# Patient Record
Sex: FEMALE | Race: OTHER | HISPANIC OR LATINO
[De-identification: names, ages, dates, MRNs, and addresses within clinical notes are randomized per-mention and may not be internally consistent; named-entity substitution may affect disease eponyms.]

---

## 2020-11-24 ENCOUNTER — LABORATORY RESULT (OUTPATIENT)
Age: 30
End: 2020-11-24

## 2020-11-25 ENCOUNTER — APPOINTMENT (OUTPATIENT)
Dept: OBGYN | Facility: CLINIC | Age: 30
End: 2020-11-25
Payer: COMMERCIAL

## 2020-11-25 VITALS
BODY MASS INDEX: 28.34 KG/M2 | WEIGHT: 154 LBS | HEIGHT: 62 IN | SYSTOLIC BLOOD PRESSURE: 118 MMHG | DIASTOLIC BLOOD PRESSURE: 74 MMHG

## 2020-11-25 DIAGNOSIS — Z30.09 ENCOUNTER FOR OTHER GENERAL COUNSELING AND ADVICE ON CONTRACEPTION: ICD-10-CM

## 2020-11-25 PROCEDURE — 99395 PREV VISIT EST AGE 18-39: CPT

## 2020-11-25 NOTE — HISTORY OF PRESENT ILLNESS
[TextBox_4] : Patient is a 29 year old female who presents for her annual exam.  Reports no menses since february.  States that she had previous irreg menses but was normal on ocps.  Would like to start xulane today to regulate menses again.  No abdominal or pelvic pain, change in discharge, change in bowel or bladder habits or any other concerns.  Due for pap.

## 2020-11-25 NOTE — REVIEW OF SYSTEMS
[Patient Intake Form Reviewed] : Patient intake form was reviewed [Abn Vaginal bleeding] : abnormal vaginal bleeding [Negative] : Heme/Lymph [FreeTextEntry8] : amenorrhea since Feb

## 2020-11-25 NOTE — COUNSELING
[Nutrition/ Exercise/ Weight Management] : nutrition, exercise, weight management [Body Image] : body image [Breast Self Exam] : breast self exam [Bladder Hygiene] : bladder hygiene [Contraception/ Emergency Contraception/ Safe Sexual Practices] : contraception, emergency contraception, safe sexual practices [STD (testing, results, tx)] : STD (testing, results, tx) [Vaccines] : vaccines

## 2021-04-18 ENCOUNTER — TRANSCRIPTION ENCOUNTER (OUTPATIENT)
Age: 31
End: 2021-04-18

## 2021-11-15 ENCOUNTER — APPOINTMENT (OUTPATIENT)
Dept: INTERNAL MEDICINE | Facility: CLINIC | Age: 31
End: 2021-11-15
Payer: COMMERCIAL

## 2021-11-15 VITALS
WEIGHT: 165 LBS | BODY MASS INDEX: 30.36 KG/M2 | DIASTOLIC BLOOD PRESSURE: 77 MMHG | RESPIRATION RATE: 14 BRPM | OXYGEN SATURATION: 96 % | HEIGHT: 62 IN | HEART RATE: 103 BPM | TEMPERATURE: 98.3 F | SYSTOLIC BLOOD PRESSURE: 128 MMHG

## 2021-11-15 DIAGNOSIS — Z86.69 PERSONAL HISTORY OF OTHER DISEASES OF THE NERVOUS SYSTEM AND SENSE ORGANS: ICD-10-CM

## 2021-11-15 DIAGNOSIS — Z78.9 OTHER SPECIFIED HEALTH STATUS: ICD-10-CM

## 2021-11-15 DIAGNOSIS — H93.11 TINNITUS, RIGHT EAR: ICD-10-CM

## 2021-11-15 DIAGNOSIS — Z91.89 OTHER SPECIFIED PERSONAL RISK FACTORS, NOT ELSEWHERE CLASSIFIED: ICD-10-CM

## 2021-11-15 DIAGNOSIS — Z82.49 FAMILY HISTORY OF ISCHEMIC HEART DISEASE AND OTHER DISEASES OF THE CIRCULATORY SYSTEM: ICD-10-CM

## 2021-11-15 PROCEDURE — 99385 PREV VISIT NEW AGE 18-39: CPT

## 2021-11-15 RX ORDER — NORELGESTROMIN AND ETHINYL ESTRADIOL 150; 35 UG/D; UG/D
150-35 PATCH TRANSDERMAL
Qty: 1 | Refills: 11 | Status: DISCONTINUED | COMMUNITY
Start: 2020-11-25 | End: 2021-11-15

## 2021-11-15 NOTE — HISTORY OF PRESENT ILLNESS
[FreeTextEntry1] : WT GAIN \par LMP 3/2020 [de-identified] : PT COMES FOR INITIAL CPE \par AT AGE 22 HER WT  LBS\par HAS BEEN HAVING RINGING ON R EAR AND SPORADIC DIZZINESS- VERTIGO TYPE FEELING THAT LAST 20-30 SECONDS,THIS HAS BEEN HAPPENING FOR LAST FEW MONTHS\par HAD MRNA X2,DOES NOT GETS INFLUENZA VACCINE

## 2021-11-15 NOTE — PHYSICAL EXAM
[No Acute Distress] : no acute distress [Well Nourished] : well nourished [Well Developed] : well developed [Well-Appearing] : well-appearing [Normal Sclera/Conjunctiva] : normal sclera/conjunctiva [PERRL] : pupils equal round and reactive to light [EOMI] : extraocular movements intact [Normal Outer Ear/Nose] : the outer ears and nose were normal in appearance [No JVD] : no jugular venous distention [No Lymphadenopathy] : no lymphadenopathy [Supple] : supple [Thyroid Normal, No Nodules] : the thyroid was normal and there were no nodules present [No Respiratory Distress] : no respiratory distress  [No Accessory Muscle Use] : no accessory muscle use [Clear to Auscultation] : lungs were clear to auscultation bilaterally [Regular Rhythm] : with a regular rhythm [Normal Rate] : normal rate  [Normal S1, S2] : normal S1 and S2 [No Murmur] : no murmur heard [No Carotid Bruits] : no carotid bruits [No Abdominal Bruit] : a ~M bruit was not heard ~T in the abdomen [No Varicosities] : no varicosities [Pedal Pulses Present] : the pedal pulses are present [No Edema] : there was no peripheral edema [No Palpable Aorta] : no palpable aorta [No Extremity Clubbing/Cyanosis] : no extremity clubbing/cyanosis [Soft] : abdomen soft [Non Tender] : non-tender [Non-distended] : non-distended [No Masses] : no abdominal mass palpated [No HSM] : no HSM [Normal Bowel Sounds] : normal bowel sounds [Normal Anterior Cervical Nodes] : no anterior cervical lymphadenopathy [No CVA Tenderness] : no CVA  tenderness [No Spinal Tenderness] : no spinal tenderness [No Joint Swelling] : no joint swelling [Grossly Normal Strength/Tone] : grossly normal strength/tone [No Rash] : no rash [Coordination Grossly Intact] : coordination grossly intact [No Focal Deficits] : no focal deficits [Normal Affect] : the affect was normal [Normal Insight/Judgement] : insight and judgment were intact

## 2021-11-15 NOTE — ASSESSMENT
[FreeTextEntry1] : INITIAL CPE OF 30 Y OLD FEM WITH R TINNITUS AND VERTIGO = ENT EVAL \par SECONDARY AMENORRHEA= GYN EVAL,THEN ENDO \par VIT D DEF= 61207 IU VIT D3 WEEKLY FOR 6 WEEKS THEN 2000 IU VIT D3 DAILY

## 2021-11-15 NOTE — PAST MEDICAL HISTORY
[Definite ___ (Date)] : the last menstrual period was [unfilled] [Amenorrhea] : amenorrhea [Total Preg ___] : G[unfilled] [AB Induced ___] : elective abortions: [unfilled]

## 2021-12-02 ENCOUNTER — LABORATORY RESULT (OUTPATIENT)
Age: 31
End: 2021-12-02

## 2021-12-03 ENCOUNTER — APPOINTMENT (OUTPATIENT)
Dept: OBGYN | Facility: CLINIC | Age: 31
End: 2021-12-03
Payer: COMMERCIAL

## 2021-12-03 VITALS
SYSTOLIC BLOOD PRESSURE: 132 MMHG | HEART RATE: 96 BPM | BODY MASS INDEX: 30.18 KG/M2 | WEIGHT: 165 LBS | DIASTOLIC BLOOD PRESSURE: 82 MMHG

## 2021-12-03 DIAGNOSIS — N92.6 IRREGULAR MENSTRUATION, UNSPECIFIED: ICD-10-CM

## 2021-12-03 DIAGNOSIS — Z01.419 ENCOUNTER FOR GYNECOLOGICAL EXAMINATION (GENERAL) (ROUTINE) W/OUT ABNORMAL FINDINGS: ICD-10-CM

## 2021-12-03 PROCEDURE — 99395 PREV VISIT EST AGE 18-39: CPT

## 2021-12-03 NOTE — HISTORY OF PRESENT ILLNESS
[FreeTextEntry1] : here for annual\par states her LMP was 03/2020 \par gas recently gained weight \par

## 2023-02-01 ENCOUNTER — APPOINTMENT (OUTPATIENT)
Dept: INTERNAL MEDICINE | Facility: CLINIC | Age: 33
End: 2023-02-01
Payer: COMMERCIAL

## 2023-02-01 VITALS
SYSTOLIC BLOOD PRESSURE: 113 MMHG | RESPIRATION RATE: 16 BRPM | OXYGEN SATURATION: 96 % | TEMPERATURE: 98.5 F | HEART RATE: 100 BPM | DIASTOLIC BLOOD PRESSURE: 83 MMHG | BODY MASS INDEX: 29.81 KG/M2 | HEIGHT: 62 IN | WEIGHT: 162 LBS

## 2023-02-01 DIAGNOSIS — R40.0 SOMNOLENCE: ICD-10-CM

## 2023-02-01 DIAGNOSIS — H02.9 UNSPECIFIED DISORDER OF EYELID: ICD-10-CM

## 2023-02-01 DIAGNOSIS — Z00.00 ENCOUNTER FOR GENERAL ADULT MEDICAL EXAMINATION W/OUT ABNORMAL FINDINGS: ICD-10-CM

## 2023-02-01 PROCEDURE — 99395 PREV VISIT EST AGE 18-39: CPT

## 2023-02-01 NOTE — ASSESSMENT
[FreeTextEntry1] : CPE OF 32 Y OLD FEM = LABS REVIEWED\par MILD DYSLIPIDEMIA AND LOW VIT D= DIET AND VIT D 2000 IU DAILY RECOMM \par R UPPER EYELID PTOSIS= OPHTH EVAL \par DAYTIME SOMNOLENCE= SLEEP STUDIES SPECIALIST \par RTO 1 Y OR PRN

## 2023-02-01 NOTE — HISTORY OF PRESENT ILLNESS
[de-identified] : COMES FOR CPE \par CC OF DAYTIME SOMNOLENCE AND FALLING SLEEP IF SHE STAYS ON QUIET ROOM FOR A FEW MINUTES FOR THE LAST 3 M ;NO LIFESTYLE CHANGES NO OTHER SX \par CC OF MILD PTOSIS R UPPER EYELID 1 WEEK AGO

## 2023-02-01 NOTE — PHYSICAL EXAM
[No Acute Distress] : no acute distress [Well-Appearing] : well-appearing [Normal Sclera/Conjunctiva] : normal sclera/conjunctiva [PERRL] : pupils equal round and reactive to light [EOMI] : extraocular movements intact [No JVD] : no jugular venous distention [No Lymphadenopathy] : no lymphadenopathy [Supple] : supple [Thyroid Normal, No Nodules] : the thyroid was normal and there were no nodules present [No Respiratory Distress] : no respiratory distress  [No Accessory Muscle Use] : no accessory muscle use [Clear to Auscultation] : lungs were clear to auscultation bilaterally [Normal Rate] : normal rate  [Regular Rhythm] : with a regular rhythm [Normal S1, S2] : normal S1 and S2 [No Murmur] : no murmur heard [No Carotid Bruits] : no carotid bruits [No Abdominal Bruit] : a ~M bruit was not heard ~T in the abdomen [No Varicosities] : no varicosities [Pedal Pulses Present] : the pedal pulses are present [No Edema] : there was no peripheral edema [No Palpable Aorta] : no palpable aorta [No Extremity Clubbing/Cyanosis] : no extremity clubbing/cyanosis [Soft] : abdomen soft [Non Tender] : non-tender [Non-distended] : non-distended [No Masses] : no abdominal mass palpated [No HSM] : no HSM [Normal Bowel Sounds] : normal bowel sounds [Rounded] : rounded [Normal Posterior Cervical Nodes] : no posterior cervical lymphadenopathy [Normal Anterior Cervical Nodes] : no anterior cervical lymphadenopathy [No CVA Tenderness] : no CVA  tenderness [No Spinal Tenderness] : no spinal tenderness [No Joint Swelling] : no joint swelling [Grossly Normal Strength/Tone] : grossly normal strength/tone [No Rash] : no rash [Coordination Grossly Intact] : coordination grossly intact [No Focal Deficits] : no focal deficits [Normal Affect] : the affect was normal [Normal Insight/Judgement] : insight and judgment were intact [de-identified] : R UPPER EYELID MILD PTOSIS

## 2023-02-08 ENCOUNTER — APPOINTMENT (OUTPATIENT)
Dept: INTERNAL MEDICINE | Facility: CLINIC | Age: 33
End: 2023-02-08
Payer: COMMERCIAL

## 2023-02-08 ENCOUNTER — NON-APPOINTMENT (OUTPATIENT)
Age: 33
End: 2023-02-08

## 2023-02-08 VITALS
WEIGHT: 162 LBS | DIASTOLIC BLOOD PRESSURE: 82 MMHG | RESPIRATION RATE: 16 BRPM | OXYGEN SATURATION: 98 % | HEART RATE: 120 BPM | SYSTOLIC BLOOD PRESSURE: 128 MMHG | BODY MASS INDEX: 29.81 KG/M2 | HEIGHT: 62 IN | TEMPERATURE: 98.9 F

## 2023-02-08 PROCEDURE — 99214 OFFICE O/P EST MOD 30 MIN: CPT

## 2023-02-08 PROCEDURE — 93000 ELECTROCARDIOGRAM COMPLETE: CPT

## 2023-02-08 RX ORDER — MEDROXYPROGESTERONE ACETATE 10 MG/1
10 TABLET ORAL DAILY
Qty: 30 | Refills: 4 | Status: DISCONTINUED | COMMUNITY
Start: 2021-12-03 | End: 2023-02-08

## 2023-02-08 NOTE — PAST MEDICAL HISTORY
[Menstruating] : hx of menstruating [Definite ___ (Date)] : the last menstrual period was [unfilled] [Approximately ___ (Month)] : the LMP was approximately [unfilled] month(s) ago [Regular Cycle Intervals] : have been regular

## 2023-02-08 NOTE — HISTORY OF PRESENT ILLNESS
[FreeTextEntry8] : CC OF LIGHTHEADEDNESS- VERTIGO ALONG WITH USUAL HA 2 DAYS AGO WHILE AT WORK \par YESTERDAY STAY HOME AND FELT BETTER \par TODAY DROVE TO WORK WITHOUT ANY SX BUT LATER ONE DEVELOPED LIGHTHEADEDNESS AND GAIT DISTURBANCE  AND WAS HELPED BY [employer] NOT TO FALL\par DENIES ANY OTHER SX

## 2023-02-08 NOTE — PHYSICAL EXAM
[No Acute Distress] : no acute distress [Heart Rate ___] : [unfilled] bpm [No Carotid Bruits] : no carotid bruits [No Edema] : there was no peripheral edema [Coordination Grossly Intact] : coordination grossly intact [No Focal Deficits] : no focal deficits [Normal] : affect was normal and insight and judgment were intact [Normal Gait] : normal gait [de-identified] : R UPPER EYELID PTOSIS(UNCHANGED)

## 2023-02-08 NOTE — ASSESSMENT
[FreeTextEntry1] : 32 Y OLD FEM WITH ACUTE ONSET OF VERTIGO AND UNSTABLE GAIT LAST 48 HS ,INTERMITTENT = EKG = SINUS TACH\par CT HEAD TODAY ,NEUROLOGY EVAL ASAP ;RECOMM ER IF ANY OTHER SX DEVELOPS

## 2023-02-09 ENCOUNTER — NON-APPOINTMENT (OUTPATIENT)
Age: 33
End: 2023-02-09

## 2023-02-13 ENCOUNTER — APPOINTMENT (OUTPATIENT)
Dept: NEUROLOGY | Facility: CLINIC | Age: 33
End: 2023-02-13
Payer: COMMERCIAL

## 2023-02-13 VITALS
WEIGHT: 162 LBS | HEART RATE: 120 BPM | SYSTOLIC BLOOD PRESSURE: 122 MMHG | DIASTOLIC BLOOD PRESSURE: 88 MMHG | BODY MASS INDEX: 30.58 KG/M2 | HEIGHT: 61 IN

## 2023-02-13 DIAGNOSIS — H02.401 UNSPECIFIED PTOSIS OF RIGHT EYELID: ICD-10-CM

## 2023-02-13 PROCEDURE — 99205 OFFICE O/P NEW HI 60 MIN: CPT

## 2023-02-13 NOTE — ASSESSMENT
[FreeTextEntry1] : 32-year-old woman with headache, dizziness, R ptosis, diplopia, mild L weakness, imbalance x 1 week.\par I am concerned she may have had small stroke, perhaps in the right midbrain.  Will obtain urgent MRI brain to assess as well as MRA head/neck to look for aneurysm or dissection in light of the headaches.

## 2023-02-13 NOTE — CONSULT LETTER
[Dear  ___] : Dear  [unfilled], [Consult Letter:] : I had the pleasure of evaluating your patient, [unfilled]. [Please see my note below.] : Please see my note below. [Consult Closing:] : Thank you very much for allowing me to participate in the care of this patient.  If you have any questions, please do not hesitate to contact me. [Sincerely,] : Sincerely, [FreeTextEntry2] : Tripp Cook MD [FreeTextEntry3] : Estephania James MD

## 2023-02-13 NOTE — HISTORY OF PRESENT ILLNESS
[FreeTextEntry1] : 32-year-old woman, previously healthy, reporting that last Monday morning (one week ago) she woke up with a severe bifrontal pressure-like headache and room-spinning dizziness.  Took 2 aspirin which did not help and in fact she felt a little worse.  She also noticed that her right eyelid was drooping and that her vision appeared blurry in both eyes, worsen when looking down or to the right.  Balance was also impaired.  She continued having the same symptoms on and off all week, with intermittent/incomplete headache relief with Tylenol.  A CT scan of her head ordered by her PCP was unremarkable.

## 2023-02-13 NOTE — PHYSICAL EXAM
[FreeTextEntry1] : Physical Exam\par Constitutional: no apparent distress\par Psychiatric: normal affect, euthymic, alert and oriented x 3\par \par Neurologic Exam:\par Mental Status: awake and alert, speech fluent and prosodic with no paraphasic errors\par Cranial Nerves: I: deferred; II: pupils equal round and reactive, visual fields full to confrontation, III, IV, VI: R ptosis, reports double vision when looking to R but not L V: facial sensation intact and symmetric; VII: R nasolabial fold flattening VIII: hearing intact to finger rub; IX/X: no dysarthria; XI: shoulder shrug symmetric; XII: tongue protrudes midline\par Motor: normal bulk and tone, no orbiting or pronator drift, power 5/5 R and 5-/5 L including shoulder abduction, elbow flexion and extension, wrist flexion and extension, hip flexion, knee flexion and extension, no abnormal movements\par Sensation: intact to light touch in distal upper and lower extremities bilaterally\par Coordination: finger-nose-finger intact bilaterally\par Reflexes: 2+ biceps, triceps, brachioradialis, 3+ R patella. 2+ patella\par Gait: narrow base, normal stance and stride, normal arm swing, difficulty with tandem gait\par

## 2023-02-18 ENCOUNTER — APPOINTMENT (OUTPATIENT)
Dept: MRI IMAGING | Facility: HOSPITAL | Age: 33
End: 2023-02-18

## 2023-02-18 ENCOUNTER — INPATIENT (INPATIENT)
Facility: HOSPITAL | Age: 33
LOS: 3 days | Discharge: HOME | DRG: 98 | End: 2023-02-22
Attending: HOSPITALIST | Admitting: HOSPITALIST
Payer: COMMERCIAL

## 2023-02-18 VITALS
SYSTOLIC BLOOD PRESSURE: 116 MMHG | HEART RATE: 97 BPM | OXYGEN SATURATION: 98 % | DIASTOLIC BLOOD PRESSURE: 85 MMHG | WEIGHT: 162.04 LBS | RESPIRATION RATE: 18 BRPM

## 2023-02-18 DIAGNOSIS — Z29.9 ENCOUNTER FOR PROPHYLACTIC MEASURES, UNSPECIFIED: ICD-10-CM

## 2023-02-18 DIAGNOSIS — H53.8 OTHER VISUAL DISTURBANCES: ICD-10-CM

## 2023-02-18 DIAGNOSIS — R42 DIZZINESS AND GIDDINESS: ICD-10-CM

## 2023-02-18 DIAGNOSIS — H49.20 SIXTH [ABDUCENT] NERVE PALSY, UNSPECIFIED EYE: ICD-10-CM

## 2023-02-18 LAB
ALBUMIN SERPL ELPH-MCNC: 4.1 G/DL — SIGNIFICANT CHANGE UP (ref 3.5–5)
ALP SERPL-CCNC: 82 U/L — SIGNIFICANT CHANGE UP (ref 40–120)
ALT FLD-CCNC: 47 U/L DA — SIGNIFICANT CHANGE UP (ref 10–60)
ANION GAP SERPL CALC-SCNC: 7 MMOL/L — SIGNIFICANT CHANGE UP (ref 5–17)
APTT BLD: 37.4 SEC — HIGH (ref 27.5–35.5)
AST SERPL-CCNC: 18 U/L — SIGNIFICANT CHANGE UP (ref 10–40)
BASOPHILS # BLD AUTO: 0.1 K/UL — SIGNIFICANT CHANGE UP (ref 0–0.2)
BASOPHILS NFR BLD AUTO: 0.8 % — SIGNIFICANT CHANGE UP (ref 0–2)
BILIRUB SERPL-MCNC: 0.4 MG/DL — SIGNIFICANT CHANGE UP (ref 0.2–1.2)
BUN SERPL-MCNC: 4 MG/DL — LOW (ref 7–18)
CALCIUM SERPL-MCNC: 9.3 MG/DL — SIGNIFICANT CHANGE UP (ref 8.4–10.5)
CHLORIDE SERPL-SCNC: 107 MMOL/L — SIGNIFICANT CHANGE UP (ref 96–108)
CO2 SERPL-SCNC: 26 MMOL/L — SIGNIFICANT CHANGE UP (ref 22–31)
CREAT SERPL-MCNC: 0.7 MG/DL — SIGNIFICANT CHANGE UP (ref 0.5–1.3)
EGFR: 119 ML/MIN/1.73M2 — SIGNIFICANT CHANGE UP
EOSINOPHIL # BLD AUTO: 0.06 K/UL — SIGNIFICANT CHANGE UP (ref 0–0.5)
EOSINOPHIL NFR BLD AUTO: 0.5 % — SIGNIFICANT CHANGE UP (ref 0–6)
GLUCOSE SERPL-MCNC: 108 MG/DL — HIGH (ref 70–99)
HCG SERPL-ACNC: <1 MIU/ML — SIGNIFICANT CHANGE UP
HCT VFR BLD CALC: 44.6 % — SIGNIFICANT CHANGE UP (ref 34.5–45)
HGB BLD-MCNC: 15 G/DL — SIGNIFICANT CHANGE UP (ref 11.5–15.5)
IMM GRANULOCYTES NFR BLD AUTO: 0.6 % — SIGNIFICANT CHANGE UP (ref 0–0.9)
INR BLD: 1.23 RATIO — HIGH (ref 0.88–1.16)
LYMPHOCYTES # BLD AUTO: 1.63 K/UL — SIGNIFICANT CHANGE UP (ref 1–3.3)
LYMPHOCYTES # BLD AUTO: 13.2 % — SIGNIFICANT CHANGE UP (ref 13–44)
MCHC RBC-ENTMCNC: 31 PG — SIGNIFICANT CHANGE UP (ref 27–34)
MCHC RBC-ENTMCNC: 33.6 GM/DL — SIGNIFICANT CHANGE UP (ref 32–36)
MCV RBC AUTO: 92.1 FL — SIGNIFICANT CHANGE UP (ref 80–100)
MONOCYTES # BLD AUTO: 0.91 K/UL — HIGH (ref 0–0.9)
MONOCYTES NFR BLD AUTO: 7.4 % — SIGNIFICANT CHANGE UP (ref 2–14)
NEUTROPHILS # BLD AUTO: 9.56 K/UL — HIGH (ref 1.8–7.4)
NEUTROPHILS NFR BLD AUTO: 77.5 % — HIGH (ref 43–77)
NRBC # BLD: 0 /100 WBCS — SIGNIFICANT CHANGE UP (ref 0–0)
PLATELET # BLD AUTO: 427 K/UL — HIGH (ref 150–400)
POTASSIUM SERPL-MCNC: 3.9 MMOL/L — SIGNIFICANT CHANGE UP (ref 3.5–5.3)
POTASSIUM SERPL-SCNC: 3.9 MMOL/L — SIGNIFICANT CHANGE UP (ref 3.5–5.3)
PROT SERPL-MCNC: 8.5 G/DL — HIGH (ref 6–8.3)
PROTHROM AB SERPL-ACNC: 14.7 SEC — HIGH (ref 10.5–13.4)
RBC # BLD: 4.84 M/UL — SIGNIFICANT CHANGE UP (ref 3.8–5.2)
RBC # FLD: 13.2 % — SIGNIFICANT CHANGE UP (ref 10.3–14.5)
SARS-COV-2 RNA SPEC QL NAA+PROBE: SIGNIFICANT CHANGE UP
SODIUM SERPL-SCNC: 140 MMOL/L — SIGNIFICANT CHANGE UP (ref 135–145)
WBC # BLD: 12.33 K/UL — HIGH (ref 3.8–10.5)
WBC # FLD AUTO: 12.33 K/UL — HIGH (ref 3.8–10.5)

## 2023-02-18 PROCEDURE — 99223 1ST HOSP IP/OBS HIGH 75: CPT

## 2023-02-18 PROCEDURE — 99285 EMERGENCY DEPT VISIT HI MDM: CPT

## 2023-02-18 PROCEDURE — 70496 CT ANGIOGRAPHY HEAD: CPT | Mod: 26,MA

## 2023-02-18 PROCEDURE — 70498 CT ANGIOGRAPHY NECK: CPT | Mod: 26,MA

## 2023-02-18 RX ORDER — SODIUM CHLORIDE 9 MG/ML
1000 INJECTION INTRAMUSCULAR; INTRAVENOUS; SUBCUTANEOUS ONCE
Refills: 0 | Status: COMPLETED | OUTPATIENT
Start: 2023-02-18 | End: 2023-02-18

## 2023-02-18 RX ORDER — ACETAMINOPHEN 500 MG
975 TABLET ORAL ONCE
Refills: 0 | Status: COMPLETED | OUTPATIENT
Start: 2023-02-18 | End: 2023-02-18

## 2023-02-18 RX ADMIN — SODIUM CHLORIDE 1000 MILLILITER(S): 9 INJECTION INTRAMUSCULAR; INTRAVENOUS; SUBCUTANEOUS at 16:45

## 2023-02-18 RX ADMIN — Medication 975 MILLIGRAM(S): at 16:45

## 2023-02-18 NOTE — ED PROVIDER NOTE - PROGRESS NOTE DETAILS
[facility]-DO: CT without acute findings. Discussed with Dr. Fine, recommending admission for MRI. Discussed with pt and mother, agreeable. Discussed with hospitalist/MAR, accepted for admission.

## 2023-02-18 NOTE — H&P ADULT - ATTENDING COMMENTS
32 year old woman with no medical hx of note who presents with 2 weeks of dizziness, headaches and double vision      Vital Signs Last 24 Hrs  T(C): 36.3 (18 Feb 2023 21:23), Max: 36.8 (18 Feb 2023 13:33)  T(F): 97.3 (18 Feb 2023 21:23), Max: 98.3 (18 Feb 2023 13:33)  HR: 99 (18 Feb 2023 21:23) (97 - 110)  BP: 95/62 (18 Feb 2023 21:23) (95/62 - 123/82)  BP(mean): --  RR: 18 (18 Feb 2023 21:23) (18 - 18)  SpO2: 100% (18 Feb 2023 21:23) (98% - 100%)    Parameters below as of 18 Feb 2023 21:23  Patient On (Oxygen Delivery Method): room air    Labs                         15.0   12.33 )-----------( 427      ( 18 Feb 2023 14:10 )             44.6     02-18    140  |  107  |  4<L>  ----------------------------<  108<H>  3.9   |  26  |  0.70    Ca    9.3      18 Feb 2023 14:10    TPro  8.5<H>  /  Alb  4.1  /  TBili  0.4  /  DBili  x   /  AST  18  /  ALT  47  /  AlkPhos  82  02-18    INR - 1.23    Head CT:    No gross acute intracranial abnormality is noted. If the patient has new and persistent symptoms, An 8mm rounded hypodensity demonstrating CSF density involves the left   lentiform nucleus most likely reflecting a large perivascular space, less   likely a chronic lacunar infarct.    CT angiogram neck:  No evidence for carotid or vertebral artery stenosis or dissection.    CT angiogram head:  No evidence for focal stenosis, major vessel occlusion, or aneurysm about   the [place].    CT venogram head:  No evidence for dural venous sinus thrombosis. 32 year old woman with no medical hx of note who presents with 2 weeks of right sided frontal headacheis dizziness, headaches and double vision      Vital Signs Last 24 Hrs  T(C): 36.3 (18 Feb 2023 21:23), Max: 36.8 (18 Feb 2023 13:33)  T(F): 97.3 (18 Feb 2023 21:23), Max: 98.3 (18 Feb 2023 13:33)  HR: 99 (18 Feb 2023 21:23) (97 - 110)  BP: 95/62 (18 Feb 2023 21:23) (95/62 - 123/82)  BP(mean): --  RR: 18 (18 Feb 2023 21:23) (18 - 18)  SpO2: 100% (18 Feb 2023 21:23) (98% - 100%)    Parameters below as of 18 Feb 2023 21:23  Patient On (Oxygen Delivery Method): room air    Labs                         15.0   12.33 )-----------( 427      ( 18 Feb 2023 14:10 )             44.6     02-18    140  |  107  |  4<L>  ----------------------------<  108<H>  3.9   |  26  |  0.70    Ca    9.3      18 Feb 2023 14:10    TPro  8.5<H>  /  Alb  4.1  /  TBili  0.4  /  DBili  x   /  AST  18  /  ALT  47  /  AlkPhos  82  02-18    INR - 1.23    Head CT:    No gross acute intracranial abnormality is noted. If the patient has new and persistent symptoms, An 8mm rounded hypodensity demonstrating CSF density involves the left   lentiform nucleus most likely reflecting a large perivascular space, less   likely a chronic lacunar infarct.    CT angiogram neck:  No evidence for carotid or vertebral artery stenosis or dissection.    CT angiogram head:  No evidence for focal stenosis, major vessel occlusion, or aneurysm about   the [place].    CT venogram head:  No evidence for dural venous sinus thrombosis. 32 year old woman with no medical hx of note who presents with 2 weeks of right sided frontal headache, vertiginous dizziness, blurry vision and right leaning ataxia. No focal weakness, tingling or loss of sensation.  No prior episodes  NO Family hx  NO hx of tobacco, alcohol or recreational drug use.     Vital Signs Last 24 Hrs  T(C): 36.3 (18 Feb 2023 21:23), Max: 36.8 (18 Feb 2023 13:33)  T(F): 97.3 (18 Feb 2023 21:23), Max: 98.3 (18 Feb 2023 13:33)  HR: 99 (18 Feb 2023 21:23) (97 - 110)  BP: 95/62 (18 Feb 2023 21:23) (95/62 - 123/82)  RR: 18 (18 Feb 2023 21:23) (18 - 18)  SpO2: 100% (18 Feb 2023 21:23) (98% - 100%)    Young woman, nad, AAO X 3- able to give detailed history   CN intact except CN III with dilatation of the right eye.  Bilateral nystagmus in the lateral direction  NO focal motor or sensory deficits      Labs                         15.0   12.33 )-----------( 427      ( 18 Feb 2023 14:10 )             44.6     02-18    140  |  107  |  4<L>  ----------------------------<  108<H>  3.9   |  26  |  0.70    Ca    9.3      18 Feb 2023 14:10    TPro  8.5<H>  /  Alb  4.1  /  TBili  0.4  /  DBili  x   /  AST  18  /  ALT  47  /  AlkPhos  82  02-18    INR - 1.23    Head CT:    No gross acute intracranial abnormality is noted. If the patient has new and persistent symptoms, An 8mm rounded hypodensity demonstrating CSF density involves the left   lentiform nucleus most likely reflecting a large perivascular space, less   likely a chronic lacunar infarct.    CT angiogram neck:  No evidence for carotid or vertebral artery stenosis or dissection.    CT angiogram head:  No evidence for focal stenosis, major vessel occlusion, or aneurysm about   the [place].    CT venogram head:  No evidence for dural venous sinus thrombosis.    Impression   Young woman with acute presentation with both motor and cranial nerve involvement. This presentation could be related to a posterior circulation CVA ( ataxia, vertigo) and could also be concerning for multiple sclerosis especially with her age.     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   Fall precaution

## 2023-02-18 NOTE — ED PROVIDER NOTE - CLINICAL SUMMARY MEDICAL DECISION MAKING FREE TEXT BOX
Tea: 32-year-old female with no pertinent past medical history presents with multiple medical complaints over the past 2 weeks.  Patient reports intermittent bilateral headaches associated with bilateral eye blurry vision, double vision, and dizziness.  Patient states she saw a neurologist and was scheduled to have MRI outpatient today.  Patient states she saw an eye doctor today, noted to have left side dilated pupil, since the emergency department to rule out CVA.  Denies any difficulty speaking, chest pain, shortness of breath, abdominal pain, vomiting, diarrhea, bowel/bladder dysfunction, or rash.  Denies any family history of any neurologic conditions. Left pupil fixed and dilated, right sided ptosis, lateral gaze palsy bilaterally, pink conjunctiva, anicteric. Unclear etiology, ?new onset demyelinating process vs. CVA vs. mass. Will obtain labs, imaging, neuro consult/recs with dispo pending workup.

## 2023-02-18 NOTE — H&P ADULT - PROBLEM SELECTOR PLAN 2
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  Plan: Discussed, given upnique sample -plan as above

## 2023-02-18 NOTE — ED PROVIDER NOTE - OBJECTIVE STATEMENT
32-year-old female with no pertinent past medical history presents with multiple medical complaints over the past 2 weeks.  Patient reports intermittent bilateral headaches associated with bilateral eye blurry vision, double vision, and dizziness.  Patient states she saw a neurologist and was scheduled to have MRI outpatient today.  Patient states she saw an eye doctor today, noted to have left side dilated pupil, since the emergency department to rule out CVA.  Denies any difficulty speaking, chest pain, shortness of breath, abdominal pain, vomiting, diarrhea, bowel/bladder dysfunction, or rash.  Denies any family history of any neurologic conditions.  Denies any additional complaints.

## 2023-02-18 NOTE — H&P ADULT - NSHPPHYSICALEXAM_GEN_ALL_CORE
PHYSICAL EXAMINATION:  GENERAL: NAD,   HEAD:  Atraumatic, Normocephalic  EYES:  conjunctiva and sclera clear, left eye dilated reactive to light,   NECK: Supple, No JVD, Normal thyroid  CHEST/LUNG: Clear to auscultation. Clear to percussion bilaterally; No rales, rhonchi, wheezing, or rubs  HEART: Regular rate and rhythm; No murmurs, rubs, or gallops  ABDOMEN: Soft, Nontender, Nondistended; Bowel sounds present  NERVOUS SYSTEM:  Alert & Oriented X3,  strength 5/5 BUE and BLE   EXTREMITIES:  2+ Peripheral Pulses, No clubbing, cyanosis, or edema  SKIN: warm dry

## 2023-02-18 NOTE — H&P ADULT - PROBLEM SELECTOR PLAN 1
p/w intermittent blurry vision, headache and dizziness for 2 weeks  no hx of neurological disorders  on PE: left pupil dilated  and reactive, no other focal deficits  CTH/angio/venogram negative for acute abnormalities  sxs are intermittent, low suspicion for stroke, concern for possible MS  Will get MRI  Dr. Fine consulted by ED -p/w intermittent blurry vision, headache and dizziness for 2 weeks  -no hx of neurological disorders  -on PE: left pupil dilated  and reactive, no other focal deficits  -CTH/angio/venogram negative for acute abnormalities but did show An 8mm rounded hypodensity demonstrating CSF density involves the left lentiform nucleus most likely reflecting a large perivascular space, less likely a chronic lacunar infarct  -sxs are intermittent, low suspicion for stroke, concern for possible MS  -Will get MRI  -Dr. Fine consulted by ED

## 2023-02-18 NOTE — ED ADULT NURSE NOTE - OBJECTIVE STATEMENT
pt is here for dizziness.  pt stated that dizziness and blurred vision x 2 weeks, CT was done which was normal, denied chest pain or sob, denied N/V/D, c/o unsteady gait, a/ox3, ambulatory

## 2023-02-18 NOTE — H&P ADULT - HISTORY OF PRESENT ILLNESS
32 year old female with no significant PMH presents with intermittent dizziness and blurred vision for 2 weeks. Patient states that two weeks ago she started to have double vision and dizziness. pt states the sxs were intermittent and she went to see a neurologist who recommended an MRI. patient also started to have intermittent numbness, tingling, and BLE weaknesss four days ago. She had an opthalmology appt today and was found to have a left dilated pupil and was told to come to the ED. Pt currently still has continued bilateral blurry vision, that has not worsened. Currently denies any headaches, dizziness, numbness, tingling, weakness, chest pain, abdominal pain, or change in bowel habits. No recent travel, or illnesses. No family hx of neurological disorders

## 2023-02-18 NOTE — H&P ADULT - ASSESSMENT
32 year old female with no PMH presents with two weeks of blurry vision and dizziness. Patient was found to have new onset left dilated pupil. Admitted for further neurological work up,

## 2023-02-18 NOTE — ED PROVIDER NOTE - PHYSICAL EXAMINATION
CONSTITUTIONAL: non-toxic, well appearing  SKIN: no rash, no petechiae.  EYES: left pupil fixed and dilated, right sided ptosis, lateral gaze palsy bilaterally, pink conjunctiva, anicteric  ENT: tongue and uvular midline, no exudates, moist mucous membranes  NECK: Supple; no meningismus, no JVD  CARD: RRR, no murmurs, equal radial pulses bilaterally 2+  RESP: CTAB, no respiratory distress  ABD: Soft, non-tender, non-distended, no peritoneal signs  EXT: Normal ROM x4. No edema.   NEURO: Alert, oriented. Neuro exam nonfocal, equal strength bilaterally. Finger to nose intact.   PSYCH: Cooperative, appropriate.

## 2023-02-18 NOTE — H&P ADULT - NSHPREVIEWOFSYSTEMS_GEN_ALL_CORE
REVIEW OF SYSTEMS:    CONSTITUTIONAL: + weakness, no fevers or chills  EYES/ENT: + visual changes;  No vertigo or throat pain   NECK: No pain or stiffness  RESPIRATORY: No cough, wheezing, hemoptysis; No shortness of breath  CARDIOVASCULAR: No chest pain or palpitations  GASTROINTESTINAL: No abdominal or epigastric pain. No nausea, vomiting, or hematemesis; No diarrhea or constipation. No melena or hematochezia.  GENITOURINARY: No dysuria, frequency or hematuria  NEUROLOGICAL: No numbness or weakness  SKIN: No itching, burning, rashes, or lesions   All other review of systems is negative unless indicated above.

## 2023-02-18 NOTE — H&P ADULT - PROBLEM SELECTOR PLAN 3
IMPROVE VTE Individual Risk Assessment          RISK                                                          Points  [  ] Previous VTE                                                3  [  ] Thrombophilia                                             2  [  ] Lower limb paralysis                                   2        (unable to hold up >15 seconds)    [  ] Current Cancer                                             2         (within 6 months)  [  ] Immobilization > 24 hrs                              1  [  ] ICU/CCU stay > 24 hours                             1  [  ] Age > 60                                                         1    IMPROVE VTE Score:         [   0      ]    no indication for dvt ppx at this time

## 2023-02-18 NOTE — H&P ADULT - NSHPLABSRESULTS_GEN_ALL_CORE
LABS:                        15.0   12.33 )-----------( 427      ( 18 Feb 2023 14:10 )             44.6     02-18    140  |  107  |  4<L>  ----------------------------<  108<H>  3.9   |  26  |  0.70    Ca    9.3      18 Feb 2023 14:10    TPro  8.5<H>  /  Alb  4.1  /  TBili  0.4  /  DBili  x   /  AST  18  /  ALT  47  /  AlkPhos  82  02-18    PT/INR - ( 18 Feb 2023 14:10 )   PT: 14.7 sec;   INR: 1.23 ratio         PTT - ( 18 Feb 2023 14:10 )  PTT:37.4 sec    LIVER FUNCTIONS - ( 18 Feb 2023 14:10 )  Alb: 4.1 g/dL / Pro: 8.5 g/dL / ALK PHOS: 82 U/L / ALT: 47 U/L DA / AST: 18 U/L / GGT: x

## 2023-02-19 DIAGNOSIS — G35 MULTIPLE SCLEROSIS: ICD-10-CM

## 2023-02-19 LAB
ALBUMIN SERPL ELPH-MCNC: 3.9 G/DL — SIGNIFICANT CHANGE UP (ref 3.5–5)
ALP SERPL-CCNC: 72 U/L — SIGNIFICANT CHANGE UP (ref 40–120)
ALT FLD-CCNC: 43 U/L DA — SIGNIFICANT CHANGE UP (ref 10–60)
ANION GAP SERPL CALC-SCNC: 8 MMOL/L — SIGNIFICANT CHANGE UP (ref 5–17)
AST SERPL-CCNC: 18 U/L — SIGNIFICANT CHANGE UP (ref 10–40)
BASOPHILS # BLD AUTO: 0.1 K/UL — SIGNIFICANT CHANGE UP (ref 0–0.2)
BASOPHILS NFR BLD AUTO: 1 % — SIGNIFICANT CHANGE UP (ref 0–2)
BILIRUB SERPL-MCNC: 0.4 MG/DL — SIGNIFICANT CHANGE UP (ref 0.2–1.2)
BUN SERPL-MCNC: 4 MG/DL — LOW (ref 7–18)
CALCIUM SERPL-MCNC: 9.3 MG/DL — SIGNIFICANT CHANGE UP (ref 8.4–10.5)
CHLORIDE SERPL-SCNC: 110 MMOL/L — HIGH (ref 96–108)
CO2 SERPL-SCNC: 28 MMOL/L — SIGNIFICANT CHANGE UP (ref 22–31)
CREAT SERPL-MCNC: 0.57 MG/DL — SIGNIFICANT CHANGE UP (ref 0.5–1.3)
EGFR: 125 ML/MIN/1.73M2 — SIGNIFICANT CHANGE UP
EOSINOPHIL # BLD AUTO: 0.1 K/UL — SIGNIFICANT CHANGE UP (ref 0–0.5)
EOSINOPHIL NFR BLD AUTO: 1 % — SIGNIFICANT CHANGE UP (ref 0–6)
GLUCOSE SERPL-MCNC: 99 MG/DL — SIGNIFICANT CHANGE UP (ref 70–99)
HCT VFR BLD CALC: 42.1 % — SIGNIFICANT CHANGE UP (ref 34.5–45)
HGB BLD-MCNC: 13.8 G/DL — SIGNIFICANT CHANGE UP (ref 11.5–15.5)
IMM GRANULOCYTES NFR BLD AUTO: 0.6 % — SIGNIFICANT CHANGE UP (ref 0–0.9)
LYMPHOCYTES # BLD AUTO: 1.33 K/UL — SIGNIFICANT CHANGE UP (ref 1–3.3)
LYMPHOCYTES # BLD AUTO: 13 % — SIGNIFICANT CHANGE UP (ref 13–44)
MAGNESIUM SERPL-MCNC: 2.5 MG/DL — SIGNIFICANT CHANGE UP (ref 1.6–2.6)
MCHC RBC-ENTMCNC: 30.6 PG — SIGNIFICANT CHANGE UP (ref 27–34)
MCHC RBC-ENTMCNC: 32.8 GM/DL — SIGNIFICANT CHANGE UP (ref 32–36)
MCV RBC AUTO: 93.3 FL — SIGNIFICANT CHANGE UP (ref 80–100)
MONOCYTES # BLD AUTO: 0.71 K/UL — SIGNIFICANT CHANGE UP (ref 0–0.9)
MONOCYTES NFR BLD AUTO: 6.9 % — SIGNIFICANT CHANGE UP (ref 2–14)
MRSA PCR RESULT.: SIGNIFICANT CHANGE UP
NEUTROPHILS # BLD AUTO: 7.97 K/UL — HIGH (ref 1.8–7.4)
NEUTROPHILS NFR BLD AUTO: 77.5 % — HIGH (ref 43–77)
NRBC # BLD: 0 /100 WBCS — SIGNIFICANT CHANGE UP (ref 0–0)
PHOSPHATE SERPL-MCNC: 5 MG/DL — HIGH (ref 2.5–4.5)
PLATELET # BLD AUTO: 351 K/UL — SIGNIFICANT CHANGE UP (ref 150–400)
POTASSIUM SERPL-MCNC: 4.2 MMOL/L — SIGNIFICANT CHANGE UP (ref 3.5–5.3)
POTASSIUM SERPL-SCNC: 4.2 MMOL/L — SIGNIFICANT CHANGE UP (ref 3.5–5.3)
PROT SERPL-MCNC: 7.5 G/DL — SIGNIFICANT CHANGE UP (ref 6–8.3)
RBC # BLD: 4.51 M/UL — SIGNIFICANT CHANGE UP (ref 3.8–5.2)
RBC # FLD: 13.2 % — SIGNIFICANT CHANGE UP (ref 10.3–14.5)
S AUREUS DNA NOSE QL NAA+PROBE: SIGNIFICANT CHANGE UP
SODIUM SERPL-SCNC: 146 MMOL/L — HIGH (ref 135–145)
TSH SERPL-MCNC: 1.42 UU/ML — SIGNIFICANT CHANGE UP (ref 0.34–4.82)
WBC # BLD: 10.27 K/UL — SIGNIFICANT CHANGE UP (ref 3.8–10.5)
WBC # FLD AUTO: 10.27 K/UL — SIGNIFICANT CHANGE UP (ref 3.8–10.5)

## 2023-02-19 PROCEDURE — 99223 1ST HOSP IP/OBS HIGH 75: CPT

## 2023-02-19 PROCEDURE — 99233 SBSQ HOSP IP/OBS HIGH 50: CPT

## 2023-02-19 RX ORDER — INFLUENZA VIRUS VACCINE 15; 15; 15; 15 UG/.5ML; UG/.5ML; UG/.5ML; UG/.5ML
0.5 SUSPENSION INTRAMUSCULAR ONCE
Refills: 0 | Status: DISCONTINUED | OUTPATIENT
Start: 2023-02-19 | End: 2023-02-22

## 2023-02-19 NOTE — CONSULT NOTE ADULT - ASSESSMENT
Blurry vision and diplopia, as well as dizziness/ vertigo and falling to the right side with left facial weakness concerning for MS, intercranial tumor or CVA with MS most likely.  Will recommend MRI brain, C and T spine with and w/o christo, IF ACUTE demyelination will recommend to start Solumedrol 1gram IV daily x 5 days.    vignesh Shaw and patient

## 2023-02-19 NOTE — CONSULT NOTE ADULT - SUBJECTIVE AND OBJECTIVE BOX
****TEMPLATE ONLY***      Patient is a 32y old  Female who presents with a chief complaint of     HPI:  32 year old female with no significant PMH presents with intermittent dizziness and blurred vision for 2 weeks. Patient states that two weeks ago she started to have double vision and dizziness. pt states the sxs were intermittent and she went to see a neurologist who recommended an MRI. patient also started to have intermittent numbness, tingling, and BLE weaknesss four days ago. She had an opthalmology appt today and was found to have a left dilated pupil and was told to come to the ED. Pt currently still has continued bilateral blurry vision, that has not worsened. Currently denies any headaches, dizziness, numbness, tingling, weakness, chest pain, abdominal pain, or change in bowel habits. No recent travel, or illnesses. No family hx of neurological disorders  (18 Feb 2023 22:49)         Neurological Review of Systems:  No difficulty with language.  No vision loss or double vision.  No dizziness, vertigo or new hearing loss.  No difficulty with speech or swallowing.  No focal weakness.  No focal sensory changes.  No numbness or tingling in the bilateral lower extremities.  No difficulty with balance.  No difficulty with ambulation.        MEDICATIONS  (STANDING):  influenza   Vaccine 0.5 milliLiter(s) IntraMuscular once    MEDICATIONS  (PRN):    Allergies    No Known Allergies    Intolerances      PAST MEDICAL & SURGICAL HISTORY:  No pertinent past medical history      No significant past surgical history        FAMILY HISTORY:    SOCIAL HISTORY: non smoker/ former smoker/ active smoker    Review of Systems:  Constitutional: No generalized weakness. No fevers or chills.                    Eyes, Ears, Mouth, Throat: No vision loss   Respiratory: No shortness of breath or cough.                                Cardiovascular: No chest pain or palpitations  Gastrointestinal: No nausea or vomiting.                                         Genitourinary: No urinary incontinence or burning on urination.  Musculoskeletal: No joint pain.                                                           Dermatologic: No rash.  Neurological: as per HPI                                                                      Psychiatric: No behavioral problems.  Endocrine: No known hypoglycemia.               Hematologic/Lymphatic: No easy bleeding.    O:  Vital Signs Last 24 Hrs  T(C): 36.9 (19 Feb 2023 12:18), Max: 37.2 (19 Feb 2023 04:50)  T(F): 98.4 (19 Feb 2023 12:18), Max: 99 (19 Feb 2023 04:50)  HR: 122 (19 Feb 2023 12:18) (99 - 122)  BP: 111/89 (19 Feb 2023 12:18) (95/62 - 121/79)  BP(mean): --  RR: 20 (19 Feb 2023 12:18) (18 - 26)  SpO2: 100% (19 Feb 2023 12:18) (93% - 100%)    Parameters below as of 19 Feb 2023 12:18  Patient On (Oxygen Delivery Method): room air        General Exam:   General appearance: No acute distress                 Cardiovascular: Pedal dorsalis pulses intact bilaterally    Mental Status: Orientated to self, date and place.  Attention intact.  No dysarthria, aphasia or neglect.  Knowledge intact.  Registration intact.  Short and long term memory grossly intact.      Cranial Nerves: CN I - not tested.  PERRL, EOMI, VFF, no nystagmus or diplopia.  No APD.  Fundi not visualized.  CN V1-3 intact to light touch and pinprick.  No facial asymmetry.  Hearing intact to finger rub bilaterally.  Tongue, uvula and palate midline.  Sternocleidomastoid and Trapezius intact bilaterally.    Motor:   Tone: normal.                  Strength intact throughout  No pronator drift bilaterally                      No dysmetria on finger-nose-finger or heel-shin-heel  No truncal ataxia.  No resting, postural or action tremor.  No myoclonus.    Sensation: intact to light touch, pinprick, vibration and proprioception    Deep Tendon Reflexes: 1+ bilateral biceps, triceps, brachioradialis, knee and ankle  Toes flexor bilaterally    Gait: normal and stable.  Rhomberg -jessica.    Other:     LABS:                        13.8   10.27 )-----------( 351      ( 19 Feb 2023 07:20 )             42.1     02-19    146<H>  |  110<H>  |  4<L>  ----------------------------<  99  4.2   |  28  |  0.57    Ca    9.3      19 Feb 2023 07:20  Phos  5.0     02-19  Mg     2.5     02-19    TPro  7.5  /  Alb  3.9  /  TBili  0.4  /  DBili  x   /  AST  18  /  ALT  43  /  AlkPhos  72  02-19    PT/INR - ( 18 Feb 2023 14:10 )   PT: 14.7 sec;   INR: 1.23 ratio         PTT - ( 18 Feb 2023 14:10 )  PTT:37.4 sec        RADIOLOGY & ADDITIONAL STUDIES:    EKG:     < from: CT Angio Head w/ IV Cont (02.18.23 @ 18:40) > (images reviewed)  Head CT:    No gross acute intracranial abnormality is noted. If the patient has new   and persistent symptoms, considerfollow-up brain and orbital MRI for   further workup.    An 8mm rounded hypodensity demonstrating CSF density involves the left   lentiform nucleus most likely reflecting a large perivascular space, less   likely a chronic lacunar infarct.    CT angiogram neck:    No evidence for carotid or vertebral artery stenosis or dissection.    CT angiogram head:    No evidence for focal stenosis, major vessel occlusion, or aneurysm about   the [place].    CT venogram head:    No evidence for duralvenous sinus thrombosis.    < end of copied text >     Patient is a 32y old  Female who presents with a chief complaint of dizziness and blurry vision    HPI:  32 year old female with no significant PMH presents with intermittent dizziness and blurred vision for 2 weeks. Patient states that two weeks ago she started to have horizontal double vision and dizziness. pt states the sxs were intermittent and she went to see a neurologist who recommended an MRI. patient also started to have intermittent numbness, tingling, and BLE weaknesss four days ago involving  hands and feet. She had an opthalmology appt yesterday and was found to have a left dilated pupil and was told to come to the ED. Pt currently still has continued bilateral blurry vision, that has not worsened.     Also had difficulty with balance falling to the right side.  No Fhx MS.  G[facility] in [facility].       Neurological Review of Systems:  No difficulty with language.    +dizziness, vertigo.  No new hearing loss.  No difficulty with speech or swallowing.  No focal weakness.  + difficulty with balance.  No difficulty with ambulation.        MEDICATIONS  (STANDING):  influenza   Vaccine 0.5 milliLiter(s) IntraMuscular once    MEDICATIONS  (PRN):    Allergies    No Known Allergies    Intolerances      PAST MEDICAL & SURGICAL HISTORY:  No pertinent past medical history      No significant past surgical history        FAMILY HISTORY: nc parents    SOCIAL HISTORY: non smoker    Review of Systems:  Constitutional: . No fevers                 Eyes, Ears, Mouth, Throat: No vision loss   Respiratory: No cough.                                Cardiovascular: No chest pain or palpitations  Gastrointestinal: No  vomiting.                                         Genitourinary: No urinary incontinence   Musculoskeletal: No joint pain.                                                           Dermatologic: No rash.  Neurological: as per HPI                                                                      Psychiatric: No behavioral problems.  Endocrine: No known hypoglycemia.               Hematologic/Lymphatic: No easy bleeding.    O:  Vital Signs Last 24 Hrs  T(C): 36.9 (19 Feb 2023 12:18), Max: 37.2 (19 Feb 2023 04:50)  T(F): 98.4 (19 Feb 2023 12:18), Max: 99 (19 Feb 2023 04:50)  HR: 122 (19 Feb 2023 12:18) (99 - 122)  BP: 111/89 (19 Feb 2023 12:18) (95/62 - 121/79)  BP(mean): --  RR: 20 (19 Feb 2023 12:18) (18 - 26)  SpO2: 100% (19 Feb 2023 12:18) (93% - 100%)    Parameters below as of 19 Feb 2023 12:18  Patient On (Oxygen Delivery Method): room air        General Exam:   General appearance: No acute distress                 Cardiovascular: Pedal dorsalis pulses intact bilaterally    Mental Status: Orientated to self, date and place.  Attention intact.  No dysarthria, aphasia or neglect.  Knowledge intact.  Registration intact.  Short and long term memory grossly intact.      Cranial Nerves: CN I - not tested.  left pupil 5mm->NR and right 4mm->3mm.  EOMI, +horizontal nystagmus to left and right and horizontal diplopia when looks to left and right.  Fundi not visualized.  CN V1-3 intact to light touch and pinprick.  +left UMN facial weakness.   Hearing intact to finger rub bilaterally.  Tongue, uvula and palate midline.  Sternocleidomastoid and Trapezius intact bilaterally.    Motor:   Tone: normal.                  Strength intact throughout  No pronator drift bilaterally                      No dysmetria on finger-nose-finger or heel-shin-heel  No truncal ataxia.  No resting, postural or action tremor.  No myoclonus.    Sensation: intact to light touch    Deep Tendon Reflexes: 1+ bilateral biceps, triceps, brachioradialis, knee and ankle  Toes flexor bilaterally    Gait: normal and stable.      Other:     LABS:                        13.8   10.27 )-----------( 351      ( 19 Feb 2023 07:20 )             42.1     02-19    146<H>  |  110<H>  |  4<L>  ----------------------------<  99  4.2   |  28  |  0.57    Ca    9.3      19 Feb 2023 07:20  Phos  5.0     02-19  Mg     2.5     02-19    TPro  7.5  /  Alb  3.9  /  TBili  0.4  /  DBili  x   /  AST  18  /  ALT  43  /  AlkPhos  72  02-19    PT/INR - ( 18 Feb 2023 14:10 )   PT: 14.7 sec;   INR: 1.23 ratio         PTT - ( 18 Feb 2023 14:10 )  PTT:37.4 sec        RADIOLOGY & ADDITIONAL STUDIES:    EKG:     < from: CT Angio Head w/ IV Cont (02.18.23 @ 18:40) > (images reviewed)  Head CT:    No gross acute intracranial abnormality is noted. If the patient has new   and persistent symptoms, considerfollow-up brain and orbital MRI for   further workup.    An 8mm rounded hypodensity demonstrating CSF density involves the left   lentiform nucleus most likely reflecting a large perivascular space, less   likely a chronic lacunar infarct.    CT angiogram neck:    No evidence for carotid or vertebral artery stenosis or dissection.    CT angiogram head:    No evidence for focal stenosis, major vessel occlusion, or aneurysm about   the [place].    CT venogram head:    No evidence for duralvenous sinus thrombosis.    < end of copied text >

## 2023-02-20 PROCEDURE — 70553 MRI BRAIN STEM W/O & W/DYE: CPT | Mod: 26

## 2023-02-20 PROCEDURE — 72157 MRI CHEST SPINE W/O & W/DYE: CPT | Mod: 26

## 2023-02-20 PROCEDURE — 99233 SBSQ HOSP IP/OBS HIGH 50: CPT

## 2023-02-20 PROCEDURE — 72156 MRI NECK SPINE W/O & W/DYE: CPT | Mod: 26

## 2023-02-20 RX ORDER — ASPIRIN/CALCIUM CARB/MAGNESIUM 324 MG
81 TABLET ORAL DAILY
Refills: 0 | Status: DISCONTINUED | OUTPATIENT
Start: 2023-02-20 | End: 2023-02-20

## 2023-02-20 NOTE — PROGRESS NOTE ADULT - ATTENDING COMMENTS
32 year old woman with no medical hx of note who presents with 2 weeks of right sided frontal headache, vertiginous dizziness, blurry vision and right leaning ataxia. No focal weakness, tingling or loss of sensation.  No prior episodes  NO Family hx  NO hx of tobacco, alcohol or recreational drug use.   Vital Signs Last 24 Hrs  T(C): 36.7 (20 Feb 2023 13:05), Max: 36.7 (19 Feb 2023 22:19)  T(F): 98.1 (20 Feb 2023 13:05), Max: 98.1 (20 Feb 2023 05:44)  HR: 120 (20 Feb 2023 13:05) (103 - 120)  BP: 120/80 (20 Feb 2023 13:05) (106/69 - 120/80)  BP(mean): --  RR: 18 (20 Feb 2023 13:05) (18 - 19)  SpO2: 100% (20 Feb 2023 13:05) (97% - 100%)    Parameters below as of 20 Feb 2023 13:05  Patient On (Oxygen Delivery Method): room air      Young woman, nad, AAO X 3- able to give detailed history   Facial asymmetry CN intact except CN III with dilatation of the left eye.  no nystagmus noted    labs, as above  Head CT:    No gross acute intracranial abnormality is noted. If the patient has new and persistent symptoms, An 8mm rounded hypodensity demonstrating CSF density involves the left   lentiform nucleus most likely reflecting a large perivascular space, less   likely a chronic lacunar infarct.    CT angiogram neck:  No evidence for carotid or vertebral artery stenosis or dissection.    CT angiogram head:  No evidence for focal stenosis, major vessel occlusion, or aneurysm about   the [place] of [place].    CT venogram head:  No evidence for dural venous sinus thrombosis.    Impression   Young woman with acute presentation with both motor and cranial nerve involvement. This presentation could be related to a posterior circulation CVA ( ataxia, vertigo) and could also be concerning for multiple sclerosis especially with her age.     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     Plan   serial NIHSS  ASA / statin  Discussed with Neurology:  patient will need MRI of brain and C-spine without and with contrast.  This has been ordered.
32 year old woman with no medical hx of note who presents with 2 weeks of right sided frontal headache, vertiginous dizziness, blurry vision and right leaning ataxia. No focal weakness, tingling or loss of sensation.  No prior episodes  NO Family hx  NO hx of tobacco, alcohol or recreational drug use.   Vital Signs Last 24 Hrs  T(C): 36.9 (19 Feb 2023 12:18), Max: 37.2 (19 Feb 2023 04:50)  T(F): 98.4 (19 Feb 2023 12:18), Max: 99 (19 Feb 2023 04:50)  HR: 122 (19 Feb 2023 12:18) (99 - 122)  BP: 111/89 (19 Feb 2023 12:18) (95/62 - 121/79)  BP(mean): --  RR: 20 (19 Feb 2023 12:18) (18 - 26)  SpO2: 100% (19 Feb 2023 12:18) (93% - 100%)    Parameters below as of 19 Feb 2023 12:18  Patient On (Oxygen Delivery Method): room air    Young woman, nad, AAO X 3- able to give detailed history   CN intact except CN III with dilatation of the left eye.  Bilateral nystagmus in the lateral direction  NO focal motor or sensory deficits      labs, as above  Head CT:    No gross acute intracranial abnormality is noted. If the patient has new and persistent symptoms, An 8mm rounded hypodensity demonstrating CSF density involves the left   lentiform nucleus most likely reflecting a large perivascular space, less   likely a chronic lacunar infarct.    CT angiogram neck:  No evidence for carotid or vertebral artery stenosis or dissection.    CT angiogram head:  No evidence for focal stenosis, major vessel occlusion, or aneurysm about   the [place].    CT venogram head:  No evidence for dural venous sinus thrombosis.    Impression   Young woman with acute presentation with both motor and cranial nerve involvement. This presentation could be related to a posterior circulation CVA ( ataxia, vertigo) and could also be concerning for multiple sclerosis especially with her age.     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

## 2023-02-21 LAB
ALBUMIN SERPL ELPH-MCNC: 3.8 G/DL — SIGNIFICANT CHANGE UP (ref 3.5–5)
ALP SERPL-CCNC: 73 U/L — SIGNIFICANT CHANGE UP (ref 40–120)
ALT FLD-CCNC: 48 U/L DA — SIGNIFICANT CHANGE UP (ref 10–60)
ANION GAP SERPL CALC-SCNC: 10 MMOL/L — SIGNIFICANT CHANGE UP (ref 5–17)
AST SERPL-CCNC: 21 U/L — SIGNIFICANT CHANGE UP (ref 10–40)
BILIRUB SERPL-MCNC: 0.3 MG/DL — SIGNIFICANT CHANGE UP (ref 0.2–1.2)
BUN SERPL-MCNC: 5 MG/DL — LOW (ref 7–18)
CALCIUM SERPL-MCNC: 9.5 MG/DL — SIGNIFICANT CHANGE UP (ref 8.4–10.5)
CHLORIDE SERPL-SCNC: 110 MMOL/L — HIGH (ref 96–108)
CO2 SERPL-SCNC: 24 MMOL/L — SIGNIFICANT CHANGE UP (ref 22–31)
CREAT SERPL-MCNC: 0.71 MG/DL — SIGNIFICANT CHANGE UP (ref 0.5–1.3)
EGFR: 117 ML/MIN/1.73M2 — SIGNIFICANT CHANGE UP
GLUCOSE SERPL-MCNC: 101 MG/DL — HIGH (ref 70–99)
MAGNESIUM SERPL-MCNC: 2.6 MG/DL — SIGNIFICANT CHANGE UP (ref 1.6–2.6)
PHOSPHATE SERPL-MCNC: 3.6 MG/DL — SIGNIFICANT CHANGE UP (ref 2.5–4.5)
POTASSIUM SERPL-MCNC: 3.9 MMOL/L — SIGNIFICANT CHANGE UP (ref 3.5–5.3)
POTASSIUM SERPL-SCNC: 3.9 MMOL/L — SIGNIFICANT CHANGE UP (ref 3.5–5.3)
PROT SERPL-MCNC: 7.9 G/DL — SIGNIFICANT CHANGE UP (ref 6–8.3)
SODIUM SERPL-SCNC: 144 MMOL/L — SIGNIFICANT CHANGE UP (ref 135–145)

## 2023-02-21 PROCEDURE — 99233 SBSQ HOSP IP/OBS HIGH 50: CPT

## 2023-02-21 RX ADMIN — Medication 975 MILLIGRAM(S): at 07:45

## 2023-02-21 NOTE — PROGRESS NOTE ADULT - NS ATTEND AMEND GEN_ALL_CORE FT
MRI results reviewed with Dr. Hoyt- need LP and after that will need pulse dose steroids, patient feels 50% better, still has blurry vision on periphery, ataxia improved.

## 2023-02-22 ENCOUNTER — TRANSCRIPTION ENCOUNTER (OUTPATIENT)
Age: 33
End: 2023-02-22

## 2023-02-22 VITALS
OXYGEN SATURATION: 100 % | DIASTOLIC BLOOD PRESSURE: 68 MMHG | SYSTOLIC BLOOD PRESSURE: 107 MMHG | HEART RATE: 116 BPM | TEMPERATURE: 98 F | RESPIRATION RATE: 16 BRPM

## 2023-02-22 LAB
ALBUMIN SERPL ELPH-MCNC: 3.8 G/DL — SIGNIFICANT CHANGE UP (ref 3.5–5)
ALP SERPL-CCNC: 77 U/L — SIGNIFICANT CHANGE UP (ref 40–120)
ALT FLD-CCNC: 56 U/L DA — SIGNIFICANT CHANGE UP (ref 10–60)
ANION GAP SERPL CALC-SCNC: 9 MMOL/L — SIGNIFICANT CHANGE UP (ref 5–17)
APPEARANCE CSF: CLEAR — SIGNIFICANT CHANGE UP
APPEARANCE SPUN FLD: COLORLESS — SIGNIFICANT CHANGE UP
AST SERPL-CCNC: 26 U/L — SIGNIFICANT CHANGE UP (ref 10–40)
BILIRUB SERPL-MCNC: 0.3 MG/DL — SIGNIFICANT CHANGE UP (ref 0.2–1.2)
BUN SERPL-MCNC: 6 MG/DL — LOW (ref 7–18)
CALCIUM SERPL-MCNC: 9.5 MG/DL — SIGNIFICANT CHANGE UP (ref 8.4–10.5)
CHLORIDE SERPL-SCNC: 110 MMOL/L — HIGH (ref 96–108)
CO2 SERPL-SCNC: 24 MMOL/L — SIGNIFICANT CHANGE UP (ref 22–31)
COLOR CSF: SIGNIFICANT CHANGE UP
CREAT SERPL-MCNC: 0.62 MG/DL — SIGNIFICANT CHANGE UP (ref 0.5–1.3)
CSF COMMENTS: SIGNIFICANT CHANGE UP
EGFR: 123 ML/MIN/1.73M2 — SIGNIFICANT CHANGE UP
ERYTHROCYTE [SEDIMENTATION RATE] IN BLOOD: 16 MM/HR — HIGH (ref 0–15)
GLUCOSE CSF-MCNC: 16 MG/DL — LOW (ref 40–70)
GLUCOSE SERPL-MCNC: 96 MG/DL — SIGNIFICANT CHANGE UP (ref 70–99)
GRAM STN FLD: SIGNIFICANT CHANGE UP
HCT VFR BLD CALC: 45.3 % — HIGH (ref 34.5–45)
HGB BLD-MCNC: 15.7 G/DL — HIGH (ref 11.5–15.5)
LYMPHOCYTES # CSF: 85 % — HIGH (ref 40–80)
MCHC RBC-ENTMCNC: 31.9 PG — SIGNIFICANT CHANGE UP (ref 27–34)
MCHC RBC-ENTMCNC: 34.7 GM/DL — SIGNIFICANT CHANGE UP (ref 32–36)
MCV RBC AUTO: 92.1 FL — SIGNIFICANT CHANGE UP (ref 80–100)
MONOS+MACROS NFR CSF: 6 % — LOW (ref 15–45)
NEUTROPHILS # CSF: 9 % — HIGH (ref 0–6)
NRBC # BLD: 0 /100 WBCS — SIGNIFICANT CHANGE UP (ref 0–0)
NRBC NFR CSF: 34 /UL — HIGH (ref 0–5)
PLATELET # BLD AUTO: 384 K/UL — SIGNIFICANT CHANGE UP (ref 150–400)
POTASSIUM SERPL-MCNC: 4.5 MMOL/L — SIGNIFICANT CHANGE UP (ref 3.5–5.3)
POTASSIUM SERPL-SCNC: 4.5 MMOL/L — SIGNIFICANT CHANGE UP (ref 3.5–5.3)
PROT CSF-MCNC: 207 MG/DL — HIGH (ref 15–45)
PROT SERPL-MCNC: 8.3 G/DL — SIGNIFICANT CHANGE UP (ref 6–8.3)
RBC # BLD: 4.92 M/UL — SIGNIFICANT CHANGE UP (ref 3.8–5.2)
RBC # CSF: 1 /UL — HIGH (ref 0–0)
RBC # FLD: 13.2 % — SIGNIFICANT CHANGE UP (ref 10.3–14.5)
SODIUM SERPL-SCNC: 143 MMOL/L — SIGNIFICANT CHANGE UP (ref 135–145)
SPECIMEN SOURCE: SIGNIFICANT CHANGE UP
TUBE TYPE: SIGNIFICANT CHANGE UP
WBC # BLD: 11.06 K/UL — HIGH (ref 3.8–10.5)
WBC # FLD AUTO: 11.06 K/UL — HIGH (ref 3.8–10.5)

## 2023-02-22 PROCEDURE — 88185 FLOWCYTOMETRY/TC ADD-ON: CPT

## 2023-02-22 PROCEDURE — 84100 ASSAY OF PHOSPHORUS: CPT

## 2023-02-22 PROCEDURE — 88189 FLOWCYTOMETRY/READ 16 & >: CPT

## 2023-02-22 PROCEDURE — 99233 SBSQ HOSP IP/OBS HIGH 50: CPT

## 2023-02-22 PROCEDURE — 72156 MRI NECK SPINE W/O & W/DYE: CPT

## 2023-02-22 PROCEDURE — 88108 CYTOPATH CONCENTRATE TECH: CPT | Mod: 26

## 2023-02-22 PROCEDURE — 86592 SYPHILIS TEST NON-TREP QUAL: CPT

## 2023-02-22 PROCEDURE — 84157 ASSAY OF PROTEIN OTHER: CPT

## 2023-02-22 PROCEDURE — 80053 COMPREHEN METABOLIC PANEL: CPT

## 2023-02-22 PROCEDURE — 99223 1ST HOSP IP/OBS HIGH 75: CPT | Mod: GC

## 2023-02-22 PROCEDURE — 86480 TB TEST CELL IMMUN MEASURE: CPT

## 2023-02-22 PROCEDURE — 87205 SMEAR GRAM STAIN: CPT

## 2023-02-22 PROCEDURE — 70496 CT ANGIOGRAPHY HEAD: CPT | Mod: MA

## 2023-02-22 PROCEDURE — 82784 ASSAY IGA/IGD/IGG/IGM EACH: CPT

## 2023-02-22 PROCEDURE — 85027 COMPLETE CBC AUTOMATED: CPT

## 2023-02-22 PROCEDURE — 86362 MOG-IGG1 ANTB CBA EACH: CPT

## 2023-02-22 PROCEDURE — 89051 BODY FLUID CELL COUNT: CPT

## 2023-02-22 PROCEDURE — 87635 SARS-COV-2 COVID-19 AMP PRB: CPT

## 2023-02-22 PROCEDURE — 84702 CHORIONIC GONADOTROPIN TEST: CPT

## 2023-02-22 PROCEDURE — 85610 PROTHROMBIN TIME: CPT

## 2023-02-22 PROCEDURE — A9585: CPT

## 2023-02-22 PROCEDURE — 87640 STAPH A DNA AMP PROBE: CPT

## 2023-02-22 PROCEDURE — 85652 RBC SED RATE AUTOMATED: CPT

## 2023-02-22 PROCEDURE — 83916 OLIGOCLONAL BANDS: CPT

## 2023-02-22 PROCEDURE — 85025 COMPLETE CBC W/AUTO DIFF WBC: CPT

## 2023-02-22 PROCEDURE — 82164 ANGIOTENSIN I ENZYME TEST: CPT

## 2023-02-22 PROCEDURE — 72157 MRI CHEST SPINE W/O & W/DYE: CPT

## 2023-02-22 PROCEDURE — 82042 OTHER SOURCE ALBUMIN QUAN EA: CPT

## 2023-02-22 PROCEDURE — 99239 HOSP IP/OBS DSCHRG MGMT >30: CPT

## 2023-02-22 PROCEDURE — 87641 MR-STAPH DNA AMP PROBE: CPT

## 2023-02-22 PROCEDURE — 86255 FLUORESCENT ANTIBODY SCREEN: CPT

## 2023-02-22 PROCEDURE — 82040 ASSAY OF SERUM ALBUMIN: CPT

## 2023-02-22 PROCEDURE — 36415 COLL VENOUS BLD VENIPUNCTURE: CPT

## 2023-02-22 PROCEDURE — 86341 ISLET CELL ANTIBODY: CPT

## 2023-02-22 PROCEDURE — 87476 LYME DIS DNA AMP PROBE: CPT

## 2023-02-22 PROCEDURE — 87116 MYCOBACTERIA CULTURE: CPT

## 2023-02-22 PROCEDURE — 83735 ASSAY OF MAGNESIUM: CPT

## 2023-02-22 PROCEDURE — 99285 EMERGENCY DEPT VISIT HI MDM: CPT | Mod: 25

## 2023-02-22 PROCEDURE — 86038 ANTINUCLEAR ANTIBODIES: CPT

## 2023-02-22 PROCEDURE — 86780 TREPONEMA PALLIDUM: CPT

## 2023-02-22 PROCEDURE — 82945 GLUCOSE OTHER FLUID: CPT

## 2023-02-22 PROCEDURE — 87102 FUNGUS ISOLATION CULTURE: CPT

## 2023-02-22 PROCEDURE — 88108 CYTOPATH CONCENTRATE TECH: CPT

## 2023-02-22 PROCEDURE — 83873 ASSAY OF CSF PROTEIN: CPT

## 2023-02-22 PROCEDURE — 87070 CULTURE OTHR SPECIMN AEROBIC: CPT

## 2023-02-22 PROCEDURE — 84443 ASSAY THYROID STIM HORMONE: CPT

## 2023-02-22 PROCEDURE — 70498 CT ANGIOGRAPHY NECK: CPT | Mod: MA

## 2023-02-22 PROCEDURE — 85730 THROMBOPLASTIN TIME PARTIAL: CPT

## 2023-02-22 PROCEDURE — 87483 CNS DNA AMP PROBE TYPE 12-25: CPT

## 2023-02-22 PROCEDURE — 70553 MRI BRAIN STEM W/O & W/DYE: CPT

## 2023-02-22 RX ORDER — LIDOCAINE HCL 20 MG/ML
10 VIAL (ML) INJECTION ONCE
Refills: 0 | Status: COMPLETED | OUTPATIENT
Start: 2023-02-22 | End: 2023-02-22

## 2023-02-22 RX ORDER — LIDOCAINE HCL 20 MG/ML
10 VIAL (ML) INJECTION ONCE
Refills: 0 | Status: DISCONTINUED | OUTPATIENT
Start: 2023-02-22 | End: 2023-02-22

## 2023-02-22 RX ADMIN — Medication 10 MILLILITER(S): at 15:02

## 2023-02-22 NOTE — DISCHARGE NOTE NURSING/CASE MANAGEMENT/SOCIAL WORK - PATIENT PORTAL LINK FT
You can access the FollowMyHealth Patient Portal offered by [facility] by registering at the following website: [email or web address]. By joining [facility]’s FollowMyHealth portal, you will also be able to view your health information using other applications (apps) compatible with our system.

## 2023-02-22 NOTE — DISCHARGE NOTE PROVIDER - NSDCCPCAREPLAN_GEN_ALL_CORE_FT
PRINCIPAL DISCHARGE DIAGNOSIS  Diagnosis: Demyelinating neuropathy  Assessment and Plan of Treatment: You were admitted to hospital for weakness, blurry vision and facial numbness. You had a series of CT and MRI performed and neurology was consulted for diagnoses and recommendations. A lumbar puncture was performed today and sample fluid was sent to lab. Follow up with neurologist dr. Hoyt in the office next week.  A demyelinating disease is any condition that causes damage to the protective covering (myelin sheath) that surrounds nerve fibers in your brain, the nerves leading to the eyes (optic nerves) and spinal cord. When the myelin sheath is damaged, nerve impulses slow or even stop, causing neurological problems.  Follow up with neurologist dr Hoyt in one week for lumbar puncture results.        SECONDARY DISCHARGE DIAGNOSES  Diagnosis: Chronic meningitis  Assessment and Plan of Treatment: Chronic meningitis — one that's long-lasting — can be caused by slow-growing organisms such as fungi and Mycobacterium tuberculosis. They invade the membranes and fluid surrounding the brain. Chronic meningitis develops over two weeks or more. Symptoms are similar to acute meningitis, which is a sudden, new case. They include headache, fever, vomiting and mental cloudiness.  Follow up with neurologist dr. Hoyt after discharge for LP results.      Diagnosis: Dizziness  Assessment and Plan of Treatment: Dizziness  Dizziness can manifest as a feeling of unsteadiness or light-headedness. You may feel like you are about to faint. This condition can be caused by a number of things, including medicines, dehydration, or illness. Drink enough fluid to keep your urine clear or pale yellow. Do not drink alcohol and limit your caffeine intake. Avoid quick or sudden movements.  Rise slowly from chairs and steady yourself until you feel okay. In the morning, first sit up on the side of the bed.  SEEK IMMEDIATE MEDICAL CARE IF YOU HAVE ANY OF THE FOLLOWING SYMPTOMS: vomiting, changes in your vision or speech, weakness in your arms or legs, trouble speaking or swallowing, chest pain, abdominal pain, shortness of breath, sweating, bleeding, headache, neck pain, or fever.       PRINCIPAL DISCHARGE DIAGNOSIS  Diagnosis: Chronic meningitis  Assessment and Plan of Treatment: Chronic meningitis — one that's long-lasting — can be caused by slow-growing organisms such as fungi and Mycobacterium tuberculosis. They invade the membranes and fluid surrounding the brain. Chronic meningitis develops over two weeks or more. Symptoms are similar to acute meningitis, which is a sudden, new case. They include headache, fever, vomiting and mental cloudiness.  Follow up with neurologist dr. Hoyt after discharge for LP results.        SECONDARY DISCHARGE DIAGNOSES  Diagnosis: Demyelinating neuropathy  Assessment and Plan of Treatment: You were admitted to hospital for weakness, blurry vision and facial numbness. You had a series of CT and MRI performed and neurology was consulted for diagnoses and recommendations. A lumbar puncture was performed today and sample fluid was sent to lab. Follow up with neurologist dr. Hoyt in the office next week.  A demyelinating disease is any condition that causes damage to the protective covering (myelin sheath) that surrounds nerve fibers in your brain, the nerves leading to the eyes (optic nerves) and spinal cord. When the myelin sheath is damaged, nerve impulses slow or even stop, causing neurological problems.  Follow up with neurologist dr Hoyt in one week for lumbar puncture results.      Diagnosis: Dizziness  Assessment and Plan of Treatment: Dizziness  Dizziness can manifest as a feeling of unsteadiness or light-headedness. You may feel like you are about to faint. This condition can be caused by a number of things, including medicines, dehydration, or illness. Drink enough fluid to keep your urine clear or pale yellow. Do not drink alcohol and limit your caffeine intake. Avoid quick or sudden movements.  Rise slowly from chairs and steady yourself until you feel okay. In the morning, first sit up on the side of the bed.  SEEK IMMEDIATE MEDICAL CARE IF YOU HAVE ANY OF THE FOLLOWING SYMPTOMS: vomiting, changes in your vision or speech, weakness in your arms or legs, trouble speaking or swallowing, chest pain, abdominal pain, shortness of breath, sweating, bleeding, headache, neck pain, or fever.      Diagnosis: Chronic meningitis  Assessment and Plan of Treatment: Chronic meningitis — one that's long-lasting — can be caused by slow-growing organisms such as fungi and Mycobacterium tuberculosis. They invade the membranes and fluid surrounding the brain. Chronic meningitis develops over two weeks or more. Symptoms are similar to acute meningitis, which is a sudden, new case. They include headache, fever, vomiting and mental cloudiness.  Follow up with neurologist dr. Hoyt after discharge for LP results.

## 2023-02-22 NOTE — DISCHARGE NOTE PROVIDER - CARE PROVIDERS DIRECT ADDRESSES
[email or web address]

## 2023-02-22 NOTE — PROGRESS NOTE ADULT - PROBLEM SELECTOR PLAN 1
-p/w intermittent blurry vision, headache and dizziness for 2 weeks  -no hx of neurological disorders  -on PE: left pupil dilated  and reactive, no other focal deficits  -CTH/angio/venogram negative for acute abnormalities but did show An 8mm rounded hypodensity demonstrating CSF density involves the left lentiform nucleus most likely reflecting a large perivascular space, less likely a chronic lacunar infarct  -sxs are intermittent, low suspicion for stroke, concern for possible MS  -Will get MRI  -Dr. Fine consulted by ED
p/w intermittent blurry vision, headache and dizziness for 2 weeks  -no hx of neurological disorders  -on PE: left pupil dilated  and reactive, no other focal deficits  -CTH/angio/venogram negative for acute abnormalities but did show An 8mm rounded hypodensity demonstrating CSF density involves the left lentiform nucleus most likely reflecting a large perivascular space, less likely a chronic lacunar infarct  -sxs are intermittent, low suspicion for stroke, concern for possible MS  -MRI performed see results above  - f/u LP  -Dr. Hoyt is following.
p/w intermittent blurry vision, headache and dizziness for 2 weeks  -no hx of neurological disorders  -on PE: left pupil dilated  and reactive, no other focal deficits  -CTH/angio/venogram negative for acute abnormalities but did show An 8mm rounded hypodensity demonstrating CSF density involves the left lentiform nucleus most likely reflecting a large perivascular space, less likely a chronic lacunar infarct  -sxs are intermittent, low suspicion for stroke, concern for possible MS  -MRI performed see results above  - pt may need LP  -Dr. Hoyt is following.
-p/w intermittent blurry vision, headache and dizziness for 2 weeks  -no hx of neurological disorders  -on PE: left pupil dilated  and reactive, no other focal deficits  -CTH/angio/venogram negative for acute abnormalities but did show An 8mm rounded hypodensity demonstrating CSF density involves the left lentiform nucleus most likely reflecting a large perivascular space, less likely a chronic lacunar infarct  -sxs are intermittent, low suspicion for stroke, concern for possible MS  -Will get MRI  -Dr. Fine consulted by ED

## 2023-02-22 NOTE — DISCHARGE NOTE PROVIDER - NSDCFUSCHEDAPPT_GEN_ALL_CORE_FT
Nikole Figueroa  [facility]  [place] [id number]  Scheduled Appointment: 03/03/2023

## 2023-02-22 NOTE — CONSULT NOTE ADULT - SUBJECTIVE AND OBJECTIVE BOX
HPI:  32 year old female with no significant PMH presents with intermittent dizziness and blurred vision for 2 weeks. Patient states that two weeks ago she started to have double vision and dizziness. pt states the sxs were intermittent and she went to see a neurologist who recommended an MRI. patient also started to have intermittent numbness, tingling, and BLE weaknesss four days ago. She had an opthalmology appt today and was found to have a left dilated pupil and was told to come to the ED. Pt currently still has continued bilateral blurry vision, that has not worsened. Currently denies any headaches, dizziness, numbness, tingling, weakness, chest pain, abdominal pain, or change in bowel habits. No recent travel, or illnesses. No family hx of neurological disorders  (18 Feb 2023 22:49)    History as above. In addition patient endorses imbalance, where she feels like falling to the right side. She also endorses lower extremity weakness bilaterally, Left > Right. and generalized fatigue. Patient was [place]. She denies any travel in the last year, prior to that she has been to the [place] multiple times, and has been on cruises. She denies any sick contacts at home or work (works at a [employer]). She is unsure if she has ever had a TB skin test. Denies any contact with TB patient.     PAST MEDICAL & SURGICAL HISTORY:  No pertinent past medical history      No significant past surgical history      MEDS:  influenza   Vaccine 0.5 milliLiter(s) IntraMuscular once      ALLERGIES  No Known Allergies      SOCIAL HISTORY:  - Lives with parents  - Works at a [employer]  - Sexually active, endorses protection, last encounter over a year ago. Last STD test over 2 years ago. Refusing HIV test at this time.   - Denies tobacco, or recreational drugs  - Occasional ETOH use    FAMILY HISTORY:  Father: HLD, HTN  Half sister: Lupus  Grandmother (mother side): breast cancer  Grandfather: Alzheimer's and Diabetes     ROS:  General:  No fever, weight loss, + generalized fatigue    Skin: No itching, burning, rashes, or lesions  	  HEENT: + blurry vision, + ptosis of right eye; No difficulty hearing, tinnitus, vertigo; No sinus or throat pain    Respiratory and Thorax: No cough, wheezing, chills or hemoptysis; No Shortness of Breath  	  Cardiovascular:	No chest pain, palpitations, passing out, dizziness, or leg swelling    Abdomen: No abdominal or epigastric pain. No nausea, vomiting, or hematemesis; No diarrhea or constipation. No melena or hematochezia.    Genitourinary: 	No dysuria, frequency, hematuria, or incontinence    Musculoskeletal:	 No joint pain or swelling; No muscle, back, No extremity pain    Neurological:	+ headaches, + blurry vision, + Dizziness and imbalance, + Lower extremity weakness, + tingling in hands bl.     Psychiatric:No depression, anxiety, mood swings, or difficulty sleeping    Hematology/Lymphatics:	 No swollen lymph nodes    Endocrine:  No heat or cold intolerance; No hair loss    PHYSICAL EXAM:    Vital Signs Last 24 Hrs  T(C): 36.7 (22 Feb 2023 12:20), Max: 37.1 (21 Feb 2023 21:57)  T(F): 98 (22 Feb 2023 12:20), Max: 98.8 (22 Feb 2023 05:10)  HR: 116 (22 Feb 2023 12:20) (106 - 117)  BP: 107/68 (22 Feb 2023 12:20) (102/79 - 107/68)  BP(mean): --  RR: 16 (22 Feb 2023 12:20) (16 - 18)  SpO2: 100% (22 Feb 2023 12:20) (96% - 100%)    Parameters below as of 22 Feb 2023 12:20  Patient On (Oxygen Delivery Method): room air          Gen:    HEENT:    Neck:    Lymph Nodes:    Breasts:    Back:    Chest/Thorax:    Cardiovascular:    Gastrointestinal:    Genitourinary:    Rectal:    Extremities:    Vascular:    Neurological:    Skin:    Psychiatric:    LABS/DIAGNOSTIC TESTS:                          15.7   11.06 )-----------( 384      ( 22 Feb 2023 06:35 )             45.3     WBC Count: 11.06 K/uL (02-22 @ 06:35)      02-22    143  |  110<H>  |  6<L>  ----------------------------<  96  4.5   |  24  |  0.62    Ca    9.5      22 Feb 2023 06:35  Phos  3.6     02-21  Mg     2.6     02-21    TPro  8.3  /  Alb  3.8  /  TBili  0.3  /  DBili  x   /  AST  26  /  ALT  56  /  AlkPhos  77  02-22          LIVER FUNCTIONS - ( 22 Feb 2023 06:35 )  Alb: 3.8 g/dL / Pro: 8.3 g/dL / ALK PHOS: 77 U/L / ALT: 56 U/L DA / AST: 26 U/L / GGT: x                 LACTATE:    ABG -     CULTURES:       RADIOLOGY               HPI:  32 year old female with no significant PMH presents with intermittent dizziness and blurred vision for 2 weeks. Patient states that two weeks ago she started to have double vision and dizziness. pt states the sxs were intermittent and she went to see a neurologist who recommended an MRI. patient also started to have intermittent numbness, tingling, and BLE weaknesss four days ago. She had an opthalmology appt today and was found to have a left dilated pupil and was told to come to the ED. Pt currently still has continued bilateral blurry vision, that has not worsened. Currently denies any headaches, dizziness, numbness, tingling, weakness, chest pain, abdominal pain, or change in bowel habits. No recent travel, or illnesses. No family hx of neurological disorders  (18 Feb 2023 22:49)    History as above. In addition patient endorses imbalance, where she feels like falling to the right side. She also endorses lower extremity weakness bilaterally, Left > Right. and generalized fatigue. Patient was [place]. She denies any travel in the last year, prior to that she has been to the [place] multiple times, and has been on cruises. She denies any sick contacts at home or work (works at a [employer]). She is unsure if she has ever had a TB skin test. Denies any contact with TB patient. Today her symptoms are still present, without any change. She had an Lumbar Puncture done by Dr. Hoyt, and additional blood work was sent.     PAST MEDICAL & SURGICAL HISTORY:  No pertinent past medical history      No significant past surgical history      MEDS:  influenza   Vaccine 0.5 milliLiter(s) IntraMuscular once      ALLERGIES  No Known Allergies      SOCIAL HISTORY:  - Lives with parents  - Works at a [employer]  - Sexually active, endorses protection, last encounter over a year ago. Last STD test over 2 years ago. Refusing HIV test at this time.   - Denies tobacco, or recreational drugs  - Occasional ETOH use    FAMILY HISTORY:  Father: HLD, HTN  Half sister: Lupus  Grandmother (mother side): breast cancer  Grandfather: Alzheimer's and Diabetes     ROS:  General:  No fever, weight loss, + generalized fatigue    Skin: No itching, burning, rashes, or lesions  	  HEENT: + blurry vision, + ptosis of right eye; No difficulty hearing, tinnitus, vertigo; No sinus or throat pain    Respiratory and Thorax: No cough, wheezing, chills or hemoptysis; No Shortness of Breath  	  Cardiovascular:	No chest pain, palpitations, passing out, dizziness, or leg swelling    Abdomen: No abdominal or epigastric pain. No nausea, vomiting, or hematemesis; No diarrhea or constipation. No melena or hematochezia.    Genitourinary: 	No dysuria, frequency, hematuria, or incontinence    Musculoskeletal:	 No joint pain or swelling; No muscle, back, No extremity pain    Neurological:	+ headaches, + blurry vision, + Dizziness and imbalance, + Lower extremity weakness, + tingling in hands bl.     Psychiatric:No depression, anxiety, mood swings, or difficulty sleeping    Hematology/Lymphatics:	 No swollen lymph nodes    Endocrine:  No heat or cold intolerance; No hair loss    PHYSICAL EXAM:    Vital Signs Last 24 Hrs  T(C): 36.7 (22 Feb 2023 12:20), Max: 37.1 (21 Feb 2023 21:57)  T(F): 98 (22 Feb 2023 12:20), Max: 98.8 (22 Feb 2023 05:10)  HR: 116 (22 Feb 2023 12:20) (106 - 117)  BP: 107/68 (22 Feb 2023 12:20) (102/79 - 107/68)  BP(mean): --  RR: 16 (22 Feb 2023 12:20) (16 - 18)  SpO2: 100% (22 Feb 2023 12:20) (96% - 100%)    Parameters below as of 22 Feb 2023 12:20  Patient On (Oxygen Delivery Method): room air      Gen: NAD, well-groomed, well-developed    HEENT: Atraumatic, Normocephalic; EOMI, + anisocoria (Left > Right), Bl pupillary reaction to light. + Rt Ptosis.  No tonsillar erythema, exudates, or enlargement; Moist mucous membranes, Good dentition, No lesions    Neck: Supple, normal appearance, No JVD; Normal thyroid; Trachea midline    Lymph Nodes: No lymphadenopathy noted on cervical or supraclavicular nodes      Back: Non tender, no lesions noted    Chest/Thorax:  Lungs clear to auscultation bilaterally, No rales, rhonchi, wheezing     Cardiovascular:  + tachycardia, No murmurs, rubs, or gallops    Gastrointestinal: Soft, Nontender, Nondistended; Bowel sounds present    Extremities:  No clubbing, cyanosis, or edema    Vascular: + 2 radial pulses bilaterally    Neurological: Alert & Oriented X3, Motor Strength 5/5 B/L upper and lower extremities, sensation intact, Reflexes: +3 Right bicep and Right Patella, +1 Left bicep and left patella    Skin: No rashes or lesions;  Good capillary refill     Psychiatric: Normal mood and affect.     LABS/DIAGNOSTIC TESTS:                          15.7   11.06 )-----------( 384      ( 22 Feb 2023 06:35 )             45.3     WBC Count: 11.06 K/uL (02-22 @ 06:35)      02-22    143  |  110<H>  |  6<L>  ----------------------------<  96  4.5   |  24  |  0.62    Ca    9.5      22 Feb 2023 06:35  Phos  3.6     02-21  Mg     2.6     02-21    TPro  8.3  /  Alb  3.8  /  TBili  0.3  /  DBili  x   /  AST  26  /  ALT  56  /  AlkPhos  77  02-22          LIVER FUNCTIONS - ( 22 Feb 2023 06:35 )  Alb: 3.8 g/dL / Pro: 8.3 g/dL / ALK PHOS: 77 U/L / ALT: 56 U/L DA / AST: 26 U/L / GGT: x           CULTURES:       RADIOLOGY  < from: CT Angio Head w/ IV Cont (02.18.23 @ 18:40) >  ACC: [id number] EXAM:  CT VENOGRAM BRAIN (W)AW IC   ORDERED BY: PHILLIP WADE     ACC: [id number] EXAM:  CT ANGIO NECK (W)AW IC   ORDERED BY: PHILLIP WADE     ACC: [id number] EXAM:  CT ANGIO BRAIN (W)AW IC   ORDERED BY: PHILLIP WADE     PROCEDURE DATE:  02/18/2023          INTERPRETATION:  HISTORY: Vision changes, right-sided ptosis, left   dilated pupil. Dizziness..   32-year-old female with no pertinent past medical history presents with   multiple medical complaints over the past 2 weeks. Patient reports   intermittent bilateral headaches associated with bilateral eye blurry   vision, double vision, and dizziness. Patient states she saw a   neurologist and was scheduled to have MRI outpatient today. Patient   states she saw an eye doctor today, noted to have left side dilated   pupil, since the emergency department to ru  Description: A CT scan of the head with and without intravenous contrast   and a CT angiogram study of the neck and head were performed. A CT   venogram of the head was also performed.    The head CT was performed with axial images with coronal and sagittal   reformatted series. The CT angiogram study and the CT venogram study were   performed with axial thin section images with volume rendered 2-D and 3-D  maximum intensity projection MIP reformatted series.    90 cc intravenous Omnipaque 350 contrast was administered, 10 cc contrast   was discarded.    Head CT:    An 8mm rounded hypodensity demonstrating CSF density involves the left   lentiform nucleus most likely reflecting a large perivascular space, less   likely a chronic lacunar infarct.    There is no evidence for enhancing intracranial mass, acute hemorrhage,   or hydrocephalus. No loss of the gray matter white matter junction is   appreciated.    CTA NECK:    The aortic arch and origins of the great vessels appear unremarkable.    There is no internal carotid artery stenosis by NASCET criteria. 0%   stenosis is noted.    There is no evidence for vertebral artery stenosis.    There is no evidence for carotid or vertebral artery dissection.    CTA HEAD:    There is no evidence for focal stenosis, major vessel occlusion, or   aneurysm about the [place]. Tiny aneurysms can be beyond the   resolution of CTA imaging technique.    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 The hypoplastic left transverse and sigmoid sinus however are   patent, without filling defects noted. There is no evidence for dural   venous sinus thrombosis or cortical vein thrombosis.    IMPRESSION:    Head CT:    No gross acute intracranial abnormality is noted. If the patient has new   and persistent symptoms, considerfollow-up brain and orbital MRI for   further workup.    An 8mm rounded hypodensity demonstrating CSF density involves the left   lentiform nucleus most likely reflecting a large perivascular space, less   likely a chronic lacunar infarct.    CT angiogram neck:    No evidence for carotid or vertebral artery stenosis or dissection.    CT angiogram head:    No evidence for focal stenosis, major vessel occlusion, or aneurysm about   the [place].    CT venogram head:    No evidence for duralvenous sinus thrombosis.    _____________________________________________________________________________      < from: MR Head w/wo IV Cont (02.20.23 @ 20:05) >    ACC: [id number] EXAM:  MR SPINE CERVICAL WAW IC   ORDERED BY: MARCEL BISHOP     ACC: [id number] EXAM:  MR SPINE THORACIC WAW IC   ORDERED BY: MARCEL BISHOP     ACC: [id number] EXAM:  MR BRAIN WAW IC   ORDERED BY: MARCEL BISHOP     PROCEDURE DATE:  02/20/2023          INTERPRETATION:  MR brain with and without gadolinium    CLINICAL INFORMATION:    TECHNIQUE:   Sagittal and axial T1-weighted images, axial FLAIR images,   axial T2-weighted images, axial gradient echo images and axial diffusion   weighted images of the brain were obtained. Following 7.5 cc of Gadavist   administration, 0 cc discarded, isotropic volumetric and fast spin echo   T1-weighted images were obtained; this data was reformatted using image   post processing software in multiple imaging planes.    FINDINGS:   No prior similar studies are available for review.    The brain demonstrates scattered nodular leptomeningeal enhancement in   the anterior inferomedial frontal lobes and scattered at the convexity,   BILATERAL MCA cisterns, about the optic chiasm and proximal optic nerves,   about the satinder and medulla and BILATERAL cranial nerves as well as the   cerebellum and fourth ventricle and about the pineal gland. Differential   diagnosis includes an infectious or inflammatory process such as   sarcoidosis or TB. This may also be neoplastic in origin. Scattered foci   of FLAIR signal seen in the BILATERAL parietal subcortical white matter   as well as the anterior periventricular white matter and satinder. Tiny focus   of gliosis also seen in the central satinder. No acute cerebral cortical   infarct is found.   No intracranial hemorrhage is recognized.  No mass   effect is found in the brain.    The ventricles, sulci and basal cisterns appear unremarkable.    The vertebral and internal carotid arteries demonstrate expected flow   voids indicating their patency.    The orbits are unremarkable.  The paranasal sinuses are clear.  The nasal   cavity appears intact.  The nasopharynx is symmetric.  The central skull   base and petrous temporal bones are intact.  The calvarium appears   unremarkable.        MR cervical and thoracic spine with and without gadolinium    CLINICAL INFORMATION: Blurry vision, cranial nerve findings    TECHNIQUE:   Sagittal T1-weighted images, sagittal STIR images, sagittal   T2-weighted images and axial T1 and T2-weighted gradient-echo images of   the cervical and thoracic spine were obtained.   Following the   intravenous administration of 7.5 ml Gadavist fat saturated sagittal and   axialT1-weighted images were obtained.    FINDINGS:   No prior similar studies are available for review.    Cervical and thoracic vertebral alignment is intact.  Cervical and   thoracic vertebral body heights are maintained.  [place] signal intensity   within cervical vertebral bodies and posterior elements is unremarkable.    No osseous expansion, epidural disease or paraspinal abnormality is found.    Cervical and thoracic intervertebral discs maintain intact disc heights   and signal intensity.  No central canal or foraminal compromise is   recognized.    The cervical and thoracic cord maintains intact morphology  No cord   signal intensity abnormality or focal cord lesion is appreciated.   There   is diffuse abnormal leptomeningeal enhancement of the cervical and   thoracic cord which may be infectious, inflammatory or neoplastic in   etiology.  The cervical medullary junction shows nodular leptomeningeal   enhancement.      IMPRESSION:    MR brain: Scattered nodular leptomeningeal enhancement in the anterior   inferomedial frontal lobes and scattered at the convexity, BILATERAL MCA   cisterns, about the optic chiasm and proximal optic nerves, about the   satinder and medulla and BILATERAL cranial nerves as well as the cerebellum   and fourth ventricle and about the pineal gland. Differential diagnosis   includes an infectious or inflammatory process such as sarcoidosis or TB.   This may also be neoplastic in origin. Scattered foci of FLAIR signal   seen in the BILATERAL parietal subcortical white matter as well as the   anterior periventricular white matter and satinder. Tiny focus of gliosis   also seen in the central satinder.    MR cervical/thoracic spine:   diffuse abnormal leptomeningeal enhancement   of the cervical and thoracic cordwhich may be infectious, inflammatory   or neoplastic in etiology.  The cervical medullary junction shows nodular   leptomeningeal enhancement. Clinical workup is needed.         HPI/Course in Hospital:  32 year old female with no significant PMH presents with intermittent dizziness and blurred vision for 2 weeks. Patient states that two weeks ago, she started to have double vision and dizziness. Sxs were intermittent and she went to see a neurologist who recommended an MRI. Patient also started to have intermittent numbness, tingling, and BLE weaknesss four days ago. She had an ophthalmology appt today, was found to have a left dilated pupil and was told to come to the ED. Pt currently still has continued bilateral blurry vision that has not worsened. Currently denies any headaches, dizziness, numbness, tingling, weakness, chest pain, abdominal pain, or change in bowel habits. No recent travel, or illnesses. No family hx of neurological disorders  (18 Feb 2023 22:49)    At the time of the ID consults: history as above. In addition, patient endorses imbalance, where she feels like falling to the right side. She also endorses lower extremity weakness bilaterally, Left > Right. and generalized fatigue. Patient was [place]. She denies any travel in the last year. Prior to that, she has been to the [place] multiple times, and has been on cruises. She denies any sick contacts at home or work (works at a [employer]). She is unsure if she has ever had a TB skin test. Denies any contact with TB patient. Today her symptoms are still present without any change. She had an Lumbar Puncture done by Dr. Hoyt, and additional blood work was sent.     PAST MEDICAL & SURGICAL HISTORY:  No pertinent past medical history      No significant past surgical history      MEDS:  influenza   Vaccine 0.5 milliLiter(s) IntraMuscular once      ALLERGIES  No Known Allergies      SOCIAL HISTORY:  - Lives with parents  - Works at a [employer]  - Sexually active, endorses protection, last encounter over a year ago. Last STD test over 2 years ago. Refusing HIV test at this time.   - Denies tobacco, or recreational drugs  - Occasional ETOH use    FAMILY HISTORY:  Father: HLD, HTN  Half sister: Lupus  Grandmother (mother side): breast cancer  Grandfather: Alzheimer's and Diabetes     ROS:  General:  No fever, weight loss, + generalized fatigue    Skin: No itching, burning, rashes, or lesions  	  HEENT: + blurry vision, + ptosis of right eye; No difficulty hearing, tinnitus, vertigo; No sinus or throat pain    Respiratory and Thorax: No cough, wheezing, chills or hemoptysis; No Shortness of Breath  	  Cardiovascular:	No chest pain, palpitations, passing out, dizziness, or leg swelling    Abdomen: No abdominal or epigastric pain. No nausea, vomiting, or hematemesis; No diarrhea or constipation. No melena or hematochezia.    Genitourinary: 	No dysuria, frequency, hematuria, or incontinence    Musculoskeletal:	 No joint pain or swelling; No muscle, back, No extremity pain    Neurological:	+ headaches, + blurry vision, + Dizziness and imbalance, + Lower extremity weakness, + tingling in hands bl.     Psychiatric:  No depression, anxiety, mood swings, or difficulty sleeping    Hematology/Lymphatics:	 No swollen lymph nodes    Endocrine:  No heat or cold intolerance; No hair loss      PHYSICAL EXAM:    Vital Signs Last 24 Hrs  T(C): 36.7 (22 Feb 2023 12:20), Max: 37.1 (21 Feb 2023 21:57)  T(F): 98 (22 Feb 2023 12:20), Max: 98.8 (22 Feb 2023 05:10)  HR: 116 (22 Feb 2023 12:20) (106 - 117)  BP: 107/68 (22 Feb 2023 12:20) (102/79 - 107/68)  BP(mean): --  RR: 16 (22 Feb 2023 12:20) (16 - 18)  SpO2: 100% (22 Feb 2023 12:20) (96% - 100%)    Parameters below as of 22 Feb 2023 12:20  Patient On (Oxygen Delivery Method): room air      Gen: NAD, well-groomed, well-developed female    HEENT: Atraumatic, Normocephalic; EOMI, + anisocoria (Left > Right), Bl pupillary reaction to light. + Rt eye Ptosis.  No tonsillar erythema, exudates, or enlargement; Moist mucous membranes, Good dentition, No lesions    Neck: Supple, normal appearance, No JVD; Normal thyroid; Trachea midline    Lymph Nodes: No lymphadenopathy noted on cervical or supraclavicular nodes    Back: Non tender, no lesions noted    Chest/Thorax:  Lungs clear to auscultation bilaterally    Cardiovascular:  + tachycardia, no murmurs, rubs, or gallops    Gastrointestinal: Soft, Nontender, Nondistended; Bowel sounds present    Extremities:  No clubbing, cyanosis, or edema    Vascular: + 2 radial pulses bilaterally    Neurological: Alert & Oriented X3, Motor Strength 5/5 B/L upper and lower extremities, sensation intact, Reflexes: +3 Right biceps and Right Patella, +2 Left biceps and left patella    Skin: No rashes or lesions;  Good capillary refill     Psychiatric: Normal mood and affect.       LABS/DIAGNOSTIC TESTS:                        15.7   11.06 )-----------( 384      ( 22 Feb 2023 06:35 )             45.3     WBC Count: 11.06 K/uL (02-22 @ 06:35)      02-22    143  |  110<H>  |  6<L>  ----------------------------<  96  4.5   |  24  |  0.62    Ca    9.5      22 Feb 2023 06:35  Phos  3.6     02-21  Mg     2.6     02-21    TPro  8.3  /  Alb  3.8  /  TBili  0.3  /  DBili  x   /  AST  26  /  ALT  56  /  AlkPhos  77  02-22          LIVER FUNCTIONS - ( 22 Feb 2023 06:35 )  Alb: 3.8 g/dL / Pro: 8.3 g/dL / ALK PHOS: 77 U/L / ALT: 56 U/L DA / AST: 26 U/L / GGT: x           CULTURES:       RADIOLOGY  < from: CT Angio Head w/ IV Cont (02.18.23 @ 18:40) >  ACC: [id number] EXAM:  CT VENOGRAM BRAIN (W)AW IC   ORDERED BY: PHILLIP WADE     ACC: [id number] EXAM:  CT ANGIO NECK (W)AW IC   ORDERED BY: PHILLIP WADE     ACC: [id number] EXAM:  CT ANGIO BRAIN (W)AW IC   ORDERED BY: PHILLIP WADE     PROCEDURE DATE:  02/18/2023          INTERPRETATION:  HISTORY: Vision changes, right-sided ptosis, left   dilated pupil. Dizziness..   32-year-old female with no pertinent past medical history presents with   multiple medical complaints over the past 2 weeks. Patient reports   intermittent bilateral headaches associated with bilateral eye blurry   vision, double vision, and dizziness. Patient states she saw a   neurologist and was scheduled to have MRI outpatient today. Patient   states she saw an eye doctor today, noted to have left side dilated   pupil, since the emergency department to ru  Description: A CT scan of the head with and without intravenous contrast   and a CT angiogram study of the neck and head were performed. A CT   venogram of the head was also performed.    The head CT was performed with axial images with coronal and sagittal   reformatted series. The CT angiogram study and the CT venogram study were   performed with axial thin section images with volume rendered 2-D and 3-D  maximum intensity projection MIP reformatted series.    90 cc intravenous Omnipaque 350 contrast was administered, 10 cc contrast   was discarded.    Head CT:    An 8mm rounded hypodensity demonstrating CSF density involves the left   lentiform nucleus most likely reflecting a large perivascular space, less   likely a chronic lacunar infarct.    There is no evidence for enhancing intracranial mass, acute hemorrhage,   or hydrocephalus. No loss of the gray matter white matter junction is   appreciated.    CTA NECK:    The aortic arch and origins of the great vessels appear unremarkable.    There is no internal carotid artery stenosis by NASCET criteria. 0%   stenosis is noted.    There is no evidence for vertebral artery stenosis.    There is no evidence for carotid or vertebral artery dissection.    CTA HEAD:    There is no evidence for focal stenosis, major vessel occlusion, or   aneurysm about the [place]. Tiny aneurysms can be beyond the   resolution of CTA imaging technique.    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 The hypoplastic left transverse and sigmoid sinus however are   patent, without filling defects noted. There is no evidence for dural   venous sinus thrombosis or cortical vein thrombosis.    IMPRESSION:    Head CT:    No gross acute intracranial abnormality is noted. If the patient has new   and persistent symptoms, considerfollow-up brain and orbital MRI for   further workup.    An 8mm rounded hypodensity demonstrating CSF density involves the left   lentiform nucleus most likely reflecting a large perivascular space, less   likely a chronic lacunar infarct.    CT angiogram neck:    No evidence for carotid or vertebral artery stenosis or dissection.    CT angiogram head:    No evidence for focal stenosis, major vessel occlusion, or aneurysm about   the [place].    CT venogram head:    No evidence for duralvenous sinus thrombosis.    _____________________________________________________________________________      < from: MR Head w/wo IV Cont (02.20.23 @ 20:05) > (reviewed with Radiologist--Dr. EDINSON Martel)    ACC: [id number] EXAM:  MR SPINE CERVICAL WAW IC   ORDERED BY: MARCEL BISHOP     ACC: [id number] EXAM:  MR SPINE THORACIC WAW IC   ORDERED BY: MARCEL BISHOP     ACC: [id number] EXAM:  MR BRAIN WAW IC   ORDERED BY: MARCEL BISHOP     PROCEDURE DATE:  02/20/2023          INTERPRETATION:  MR brain with and without gadolinium    CLINICAL INFORMATION:    TECHNIQUE:   Sagittal and axial T1-weighted images, axial FLAIR images,   axial T2-weighted images, axial gradient echo images and axial diffusion   weighted images of the brain were obtained. Following 7.5 cc of Gadavist   administration, 0 cc discarded, isotropic volumetric and fast spin echo   T1-weighted images were obtained; this data was reformatted using image   post processing software in multiple imaging planes.    FINDINGS:   No prior similar studies are available for review.    The brain demonstrates scattered nodular leptomeningeal enhancement in   the anterior inferomedial frontal lobes and scattered at the convexity,   BILATERAL MCA cisterns, about the optic chiasm and proximal optic nerves,   about the satinder and medulla and BILATERAL cranial nerves as well as the   cerebellum and fourth ventricle and about the pineal gland. Differential   diagnosis includes an infectious or inflammatory process such as   sarcoidosis or TB. This may also be neoplastic in origin. Scattered foci   of FLAIR signal seen in the BILATERAL parietal subcortical white matter   as well as the anterior periventricular white matter and satinder. Tiny focus   of gliosis also seen in the central satinder. No acute cerebral cortical   infarct is found.   No intracranial hemorrhage is recognized.  No mass   effect is found in the brain.    The ventricles, sulci and basal cisterns appear unremarkable.    The vertebral and internal carotid arteries demonstrate expected flow   voids indicating their patency.    The orbits are unremarkable.  The paranasal sinuses are clear.  The nasal   cavity appears intact.  The nasopharynx is symmetric.  The central skull   base and petrous temporal bones are intact.  The calvarium appears   unremarkable.        MR cervical and thoracic spine with and without gadolinium    CLINICAL INFORMATION: Blurry vision, cranial nerve findings    TECHNIQUE:   Sagittal T1-weighted images, sagittal STIR images, sagittal   T2-weighted images and axial T1 and T2-weighted gradient-echo images of   the cervical and thoracic spine were obtained.   Following the   intravenous administration of 7.5 ml Gadavist fat saturated sagittal and   axialT1-weighted images were obtained.    FINDINGS:   No prior similar studies are available for review.    Cervical and thoracic vertebral alignment is intact.  Cervical and   thoracic vertebral body heights are maintained.  Marrow signal intensity   within cervical vertebral bodies and posterior elements is unremarkable.    No osseous expansion, epidural disease or paraspinal abnormality is found.    Cervical and thoracic intervertebral discs maintain intact disc heights   and signal intensity.  No central canal or foraminal compromise is   recognized.    The cervical and thoracic cord maintains intact morphology  No cord   signal intensity abnormality or focal cord lesion is appreciated.   There   is diffuse abnormal leptomeningeal enhancement of the cervical and   thoracic cord which may be infectious, inflammatory or neoplastic in   etiology.  The cervical medullary junction shows nodular leptomeningeal   enhancement.      IMPRESSION:    MR brain: Scattered nodular leptomeningeal enhancement in the anterior   inferomedial frontal lobes and scattered at the convexity, BILATERAL MCA   cisterns, about the optic chiasm and proximal optic nerves, about the   satinder and medulla and BILATERAL cranial nerves as well as the cerebellum   and fourth ventricle and about the pineal gland. Differential diagnosis   includes an infectious or inflammatory process such as sarcoidosis or TB.   This may also be neoplastic in origin. Scattered foci of FLAIR signal   seen in the BILATERAL parietal subcortical white matter as well as the   anterior periventricular white matter and satinder. Tiny focus of gliosis   also seen in the central satinder.    MR cervical/thoracic spine:   diffuse abnormal leptomeningeal enhancement   of the cervical and thoracic cordwhich may be infectious, inflammatory   or neoplastic in etiology.  The cervical medullary junction shows nodular   leptomeningeal enhancement. Clinical workup is needed.

## 2023-02-22 NOTE — CONSULT NOTE ADULT - ATTENDING COMMENTS
Chart reviewed and pt examined at the bedside with the resident on ID. Pt with neurologic impairment noted to have scattered but extensive leptomeningeal enhancement. Diff Dx includes malignancy, connective tissue disorder, TB, sarcoidosis, syphilis. Agree with above Hx, PE, and assessment by the resident on ID.

## 2023-02-22 NOTE — DISCHARGE NOTE PROVIDER - HOSPITAL COURSE
32 year old female with no significant PMH presents with intermittent dizziness and blurred vision for 2 weeks. Patient states that two weeks ago she started to have double vision and dizziness. pt states the sxs were intermittent and she went to see a neurologist who recommended an MRI. Patient also started to have intermittent numbness, tingling, and BLE weakness. Patient admitted to medicine for left facial weakness and to r/o MS. Neurology dr. Hoyt consulted, CT angio head and neck negative. MRI brain and C spine scattered nodular leptomeningeal enhancement in the anterior inferomedial frontal lobes and scattered at the convexity.  Lumbar puncture performed by dr Hoyt, patient will follow up after discharge next week for results and treatment.     No medications were prescribed, follow up with dr. Hoyt next week for lumbar puncture test results and treatment options.    Dr. Lai Monson MD  [address] [facility] 2nd [place] [address] [phone]      Given patient's improved clinical status and current hemodynamic stability, decision was made to discharge.  Please refer to patient's complete medical chart with documents for a full hospital course, for this is only a brief summary.

## 2023-02-22 NOTE — DISCHARGE NOTE PROVIDER - PROVIDER TOKENS
PROVIDER:[TOKEN:[[id number]],FOLLOWUP:[1 week],ESTABLISHEDPATIENT:[T]],PROVIDER:[TOKEN:[[id number]],FOLLOWUP:[1 week],ESTABLISHEDPATIENT:[T]]

## 2023-02-22 NOTE — DISCHARGE NOTE PROVIDER - NSDCPNSUBOBJ_GEN_ALL_CORE
LP done. d/w Dr. Hoyt- no more steroids- all lab work and spinal fluid sent to lab. Patient will follow up at out-patient clinic and further treatment plan  stable to dc home  d/w patient and parents at bedside
Awake/Alert/Cooperative

## 2023-02-22 NOTE — CONSULT NOTE ADULT - ASSESSMENT
32 y.o F with no PMH who presented with complains of intermittent dizziness, headache, blurry vision and lower extremity weakness. Found to have leptomeningeal disease on MRI.     #Leptomeningeal disease  - DDx is extensive, including Infectious and non infectious causes  - Need to rule out syphilis, Tuberculosis, sarcoidosis, other connective tissue diseases (FHx of lupus), and malignancy  - F/u RPR, quantiferon  - F/u KAILEY, dsDNA and other autoimmune work up  - F/u Lumbar puncture results and cultures  - F/u Malignancy work up  - Recommend close follow up with Neurology and possibly Neurosurgery for biopsy and definite diagnosis 32 y.o F with no PMH who presented with complaints of intermittent dizziness, headache, blurry vision and lower extremity weakness. Found to have leptomeningeal disease on MRI.     #Leptomeningeal disease  - DDx is extensive, including Infectious and non infectious causes including syphilis, Tuberculosis, sarcoidosis, other connective tissue diseases (FHx of lupus), and malignancy  - F/u RPR, FTA ABS, quantiferon  - F/u KAILEY, dsDNA and other autoimmune work up  - F/u Lumbar puncture results   - F/u Malignancy work up, including CT chest/abd/pelvis, mammogram  - Recommend close follow up with Neurology and possibly Neurosurgery for diagnostic tissue biopsy  (ie, 4th ventricle access)     Discussed above with Mabel Akins and Lai.

## 2023-02-22 NOTE — DISCHARGE NOTE PROVIDER - CARE PROVIDER_API CALL
Tripp Cook)  [facility]  [address], [address]nd [place] [address]  Phone: [phone]  Fax: [phone]  Established Patient  Follow Up Time: 1 week    Ermias Hoyt)  Clinical Neurophysiology; Neurology; Sleep Medicine  [address] [place] [address]  Phone: [phone]  Fax: [phone]  Established Patient  Follow Up Time: 1 week

## 2023-02-22 NOTE — DISCHARGE NOTE NURSING/CASE MANAGEMENT/SOCIAL WORK - NSDCPEFALRISK_GEN_ALL_CORE
For information on Fall & Injury Prevention, visit: [email or web address] OR  [email or web address] OR  https://www.cdc.gov/steadi/patient.html

## 2023-02-22 NOTE — PROGRESS NOTE ADULT - PROBLEM SELECTOR PLAN 3
No indication for dvt ppx
IMPROVE VTE Individual Risk Assessment          RISK                                                          Points  [  ] Previous VTE                                                3  [  ] Thrombophilia                                             2  [  ] Lower limb paralysis                                   2        (unable to hold up >15 seconds)    [  ] Current Cancer                                             2         (within 6 months)  [  ] Immobilization > 24 hrs                              1  [  ] ICU/CCU stay > 24 hours                             1  [  ] Age > 60                                                         1    IMPROVE VTE Score:         [   0      ]    no indication for dvt ppx at this time
No indication for dvt ppx
IMPROVE VTE Individual Risk Assessment          RISK                                                          Points  [  ] Previous VTE                                                3  [  ] Thrombophilia                                             2  [  ] Lower limb paralysis                                   2        (unable to hold up >15 seconds)    [  ] Current Cancer                                             2         (within 6 months)  [  ] Immobilization > 24 hrs                              1  [  ] ICU/CCU stay > 24 hours                             1  [  ] Age > 60                                                         1    IMPROVE VTE Score:         [   0      ]    no indication for dvt ppx at this time

## 2023-02-23 LAB
ALBUMIN CSF-MCNC: 116.3 MG/DL — HIGH (ref 14–25)
ALBUMIN SERPL ELPH-MCNC: 4214 MG/DL — SIGNIFICANT CHANGE UP (ref 3500–5200)
CSF PCR RESULT: SIGNIFICANT CHANGE UP
IGG CSF-MCNC: 26.4 MG/DL — HIGH
IGG FLD-MCNC: 1114 MG/DL — SIGNIFICANT CHANGE UP (ref 610–1660)
IGG SYNTH RATE SER+CSF CALC-MRATE: 61.2 MG/DAY — HIGH
IGG/ALB CLEAR SER+CSF-RTO: 0.9 — HIGH
IGG/ALB CSF: 0.23 RATIO — SIGNIFICANT CHANGE UP
IGG/ALB SER: 0.26 RATIO — SIGNIFICANT CHANGE UP
NIGHT BLUE STAIN TISS: SIGNIFICANT CHANGE UP
SPECIMEN SOURCE: SIGNIFICANT CHANGE UP
T PALLIDUM AB TITR SER: NEGATIVE — SIGNIFICANT CHANGE UP

## 2023-02-23 NOTE — PROGRESS NOTE ADULT - ASSESSMENT
32 year old female with no significant PMH presents with intermittent dizziness and blurred vision for 2 weeks. Patient states that two weeks ago she started to have double vision and dizziness. pt states the sxs were intermittent and she went to see a neurologist who recommended an MRI. Patient also started to have intermittent numbness, tingling, and BLE weakness. Patient admitted to medicine fo rleft facial weakness and to r/o MS. Neurology dr. Hoyt consulted, CT angio head and neck negative. MRI brain and C spine scattered nodular leptomeningeal enhancement in the anterior inferomedial frontal lobes and scattered at the convexity. Pending  LP, dr Hoyt is following.
Demyelination vs TB/fungal, lyme and other chronic meningitis; LP done; results awaited; ID consult requested
Demyelination vs TB/fungal, lyme and other chronic meningitis; LP done; results awaited; ID consult requested      
Persistent double vision and left side sensorimotor deficit; investigate leptomeningeal disease, demyelination causes, MD vs NMO Recommend LP discussed with patient and mother
32 year old female with no PMH presents with two weeks of blurry vision and dizziness. Patient was found to have new onset left dilated pupil. Admitted for further neurological work up, 
32 year old female with no significant PMH presents with intermittent dizziness and blurred vision for 2 weeks. Patient states that two weeks ago she started to have double vision and dizziness. pt states the sxs were intermittent and she went to see a neurologist who recommended an MRI. Patient also started to have intermittent numbness, tingling, and BLE weakness. Patient admitted to medicine fo rleft facial weakness and to r/o MS. Neurology dr. Hoyt consulted, CT angio head and neck negative. MRI brain and C spine scattered nodular leptomeningeal enhancement in the anterior inferomedial frontal lobes and scattered at the convexity. Pending final recommendation from neurology, likely patient will need LP.
32 year old female with no PMH presents with two weeks of blurry vision and dizziness. Patient was found to have new onset left dilated pupil. Admitted for further neurological work up,

## 2023-02-23 NOTE — PROGRESS NOTE ADULT - PROVIDER SPECIALTY LIST ADULT
Internal Medicine
Neurology
Internal Medicine
Neurology
Neurology
Internal Medicine
Internal Medicine

## 2023-02-23 NOTE — PROGRESS NOTE ADULT - SUBJECTIVE AND OBJECTIVE BOX
INTERVAL HPI/OVERNIGHT EVENTS:    HEALTH ISSUES - PROBLEM Dx:  Blurry vision    Dizziness    Prophylactic measure            MEDICATIONS  (STANDING):  influenza   Vaccine 0.5 milliLiter(s) IntraMuscular once    MEDICATIONS  (PRN):      Allergies    No Known Allergies    Intolerances        REVIEW OF SYSTEMS      General:	    Skin/Breast:  	  Ophthalmologic:  	  ENMT:	    Respiratory and Thorax:  	  Cardiovascular:	    Gastrointestinal:	    Genitourinary:	    Musculoskeletal:	    Neurological:	    Psychiatric:	    Hematology/Lymphatics:	    Endocrine:	    Allergic/Immunologic:	    Vital Signs Last 24 Hrs  T(C): 37.1 (21 Feb 2023 21:57), Max: 37.1 (21 Feb 2023 05:00)  T(F): 98.7 (21 Feb 2023 21:57), Max: 98.7 (21 Feb 2023 05:00)  HR: 106 (21 Feb 2023 21:57) (106 - 125)  BP: 102/79 (21 Feb 2023 21:57) (100/64 - 110/92)  BP(mean): --  RR: 16 (21 Feb 2023 21:57) (16 - 19)  SpO2: 96% (21 Feb 2023 21:57) (96% - 100%)    Parameters below as of 21 Feb 2023 21:57  Patient On (Oxygen Delivery Method): room air        PHYSICAL EXAM:      Constitutional:    Eyes:    ENMT:    Neck:    Breasts:    Back:    Respiratory:    Cardiovascular:    Gastrointestinal:    Genitourinary:    Rectal:    Extremities:    Vascular:    Neurological:    Skin:    Lymph Nodes:    Musculoskeletal:    Psychiatric:        LABS:    02-21    144  |  110<H>  |  5<L>  ----------------------------<  101<H>  3.9   |  24  |  0.71    Ca    9.5      21 Feb 2023 06:05  Phos  3.6     02-21  Mg     2.6     02-21    TPro  7.9  /  Alb  3.8  /  TBili  0.3  /  DBili  x   /  AST  21  /  ALT  48  /  AlkPhos  73  02-21          RADIOLOGY & ADDITIONAL TESTS:  < from: MR Thoracic Spine w/wo IV Cont (02.20.23 @ 20:18) >  ACC: [id number] EXAM:  MR SPINE CERVICAL WAW IC   ORDERED BY: MARCEL BISHOP     ACC: [id number] EXAM:  MR SPINE THORACIC WAW IC   ORDERED BY: MARCEL BISHOP     ACC: [id number] EXAM:  MR BRAIN WAW IC   ORDERED BY: MARCEL BISHOP     PROCEDURE DATE:  02/20/2023          INTERPRETATION:  MR brain with and without gadolinium    CLINICAL INFORMATION:    TECHNIQUE:   Sagittal and axial T1-weighted images, axial FLAIR images,   axial T2-weighted images, axial gradient echo images and axial diffusion   weighted images of the brain were obtained. Following 7.5 cc of Gadavist   administration, 0 cc discarded, isotropic volumetric and fast spin echo   T1-weighted images were obtained; this data was reformatted using image   post processing software in multiple imaging planes.    FINDINGS:   No prior similar studies are available for review.    The brain demonstrates scattered nodular leptomeningeal enhancement in   the anterior inferomedial frontal lobes and scattered at the convexity,   BILATERAL MCA cisterns, about the optic chiasm and proximal optic nerves,   about the satinder and medulla and BILATERAL cranial nerves as well as the   cerebellum and fourth ventricle and about the pineal gland. Differential   diagnosis includes an infectious or inflammatory process such as   sarcoidosis or TB. This may also be neoplastic in origin. Scattered foci   of FLAIR signal seen in the BILATERAL parietal subcortical white matter   as well as the anterior periventricular white matter and satinder. Tiny focus   of gliosis also seen in the central satinder. No acute cerebral cortical   infarct is found.   No intracranial hemorrhage is recognized.  No mass   effect is found in the brain.    The ventricles, sulci and basal cisterns appear unremarkable.    The vertebral and internal carotid arteries demonstrate expected flow   voids indicating their patency.    The orbits are unremarkable.  The paranasal sinuses are clear.  The nasal   cavity appears intact.  The nasopharynx is symmetric.  The central skull   base and petrous temporal bones are intact.  The calvarium appears   unremarkable.        MR cervical and thoracic spine with and without gadolinium    CLINICAL INFORMATION: Blurry vision, cranial nerve findings    TECHNIQUE:   Sagittal T1-weighted images, sagittal STIR images, sagittal   T2-weighted images and axial T1 and T2-weighted gradient-echo images of   the cervical and thoracic spine were obtained.   Following the   intravenous administration of 7.5 ml Gadavist fat saturated sagittal and   axialT1-weighted images were obtained.    FINDINGS:   No prior similar studies are available for review.    Cervical and thoracic vertebral alignment is intact.  Cervical and   thoracic vertebral body heights are maintained.  Marrow signal intensity   within cervical vertebral bodies and posterior elements is unremarkable.    No osseous expansion, epidural disease or paraspinal abnormality is found.    Cervical and thoracic intervertebral discs maintain intact disc heights   and signal intensity.  No central canal or foraminal compromise is   recognized.    The cervical and thoracic cord maintains intact morphology  No cord   signal intensity abnormality or focal cord lesion is appreciated.   There   is diffuse abnormal leptomeningeal enhancement of the cervical and   thoracic cord which may be infectious, inflammatory or neoplastic in   etiology.  The cervical medullary junction shows nodular leptomeningeal   enhancement.      IMPRESSION:    MR brain: Scattered nodular leptomeningeal enhancement in the anterior   inferomedial frontal lobes and scattered at the convexity, BILATERAL MCA   cisterns, about the optic chiasm and proximal optic nerves, about the   satinder and medulla and BILATERAL cranial nerves as well as the cerebellum   and fourth ventricle and about the pineal gland. Differential diagnosis   includes an infectious or inflammatory process such as sarcoidosis or TB.   This may also be neoplastic in origin. Scattered foci of FLAIR signal   seen in the BILATERAL parietal subcortical white matter as well as the   anterior periventricular white matter and satinder. Tiny focus of gliosis   also seen in the central satinder.    MR cervical/thoracic spine:   diffuse abnormal leptomeningeal enhancement   of the cervical and thoracic cordwhich may be infectious, inflammatory   or neoplastic in etiology.  The cervical medullary junction shows nodular   leptomeningeal enhancement. Clinical workup is needed.    --- End of Report ---            ALLA NINO MD; Attending Radiologist  This document has been electronically signed. Feb 20 2023  8:35PM    < end of copied text >  
Progress Note:   · Provider Specialty	Neurology    Reason for Admission:   Reason for Admission:  · Reason for Admission	double vision dizziness balance problems      · Subjective and Objective:   Patient is a 32y old  Female who presents with a chief complaint of double vision (21 Feb 2023 11:20)      HPI:    PAST MEDICAL & SURGICAL HISTORY:  No pertinent past medical history      No significant past surgical history          FAMILY HISTORY:        Social Hx:  Nonsmoker, no drug or alcohol use    MEDICATIONS  (STANDING):       Allergies    No Known Allergies    Intolerances        ROS: Pertinent positives in HPI, all other ROS were reviewed and are negative.      Vital Signs Last 24 Hrs  T(C): 36.7 (22 Feb 2023 12:20), Max: 36.7 (22 Feb 2023 12:20)  T(F): 98 (22 Feb 2023 12:20), Max: 98 (22 Feb 2023 12:20)  HR: 116 (22 Feb 2023 12:20) (116 - 116)  BP: 107/68 (22 Feb 2023 12:20) (107/68 - 107/68)  BP(mean): --  RR: 16 (22 Feb 2023 12:20) (16 - 16)  SpO2: 100% (22 Feb 2023 12:20) (100% - 100%)    Parameters below as of 22 Feb 2023 12:20  Patient On (Oxygen Delivery Method): room air            Constitutional: awake and alert.  HEENT: asymmetrical OS 7mm poorly reactive direct consensual and accomodation; OD 4.5 mm also weakle reactive EOMI,   Neck: Supple.  Respiratory: Breath sounds are clear bilaterally  Cardiovascular: S1 and S2, regular / irregular rhythm  Gastrointestinal: soft, nontender  Extremities:  no edema  Vascular: Carotid Bruit - no  Musculoskeletal: no joint swelling/tenderness, no abnormal movements  Skin: No rashes    Neurological exam:  HF: A x O x 3. Appropriately interactive, normal affect. Speech fluent, No Aphasia or paraphasic errors. Naming /repetition intact   CN: asymmetrical OS 7mm poorly reactive direct consensual and accomodation; OD 4.5 mm also weakly reactive EOMI, right FABIO with abducting eye nystagmus OS, VFF, facial sensation normal, left UMN type facial weakness, tongue midline, Palate moves equally, SCM equal bilaterally  Motor: Left pronator drift, Strength 4/5 in all left ext, normal bulk and tone, no tremor, rigidity or bradykinesia.    Sens: Left weak to light touch / PP/ VS/ JS    Reflexes: Brisker left  Coord:  No FNFA, dysmetria, ALENA intact   Gait/Balance: normal stance and steps     NIHSS: not acute          Labs:                        15.7   11.06 )-----------( 384      ( 22 Feb 2023 06:35 )             45.3     02-22    143  |  110<H>  |  6<L>  ----------------------------<  96  4.5   |  24  |  0.62    Ca    9.5      22 Feb 2023 06:35    TPro  8.3  /  Alb  3.8  /  TBili  0.3  /  DBili  x   /  AST  26  /  ALT  56  /  AlkPhos  77  02-22            Radiology report:  - CT head:    - MRI brain  < from: MR Thoracic Spine w/wo IV Cont (02.20.23 @ 20:18) >  ACC: [id number] EXAM:  MR SPINE CERVICAL WAW IC   ORDERED BY: MARCEL BISHOP     ACC: [id number] EXAM:  MR SPINE THORACIC WAW IC   ORDERED BY: MARCEL BISHOP     ACC: [id number] EXAM:  MR BRAIN WAW IC   ORDERED BY: MARCEL BISHOP     PROCEDURE DATE:  02/20/2023          INTERPRETATION:  MR brain with and without gadolinium    CLINICAL INFORMATION:    TECHNIQUE:   Sagittal and axial T1-weighted images, axial FLAIR images,   axial T2-weighted images, axial gradient echo images and axial diffusion   weighted images of the brain were obtained. Following 7.5 cc of Gadavist   administration, 0 cc discarded, isotropic volumetric and fast spin echo   T1-weighted images were obtained; this data was reformatted using image   post processing software in multiple imaging planes.    FINDINGS:   No prior similar studies are available for review.    The brain demonstrates scattered nodular leptomeningeal enhancement in   the anterior inferomedial frontal lobes and scattered at the convexity,   BILATERAL MCA cisterns, about the optic chiasm and proximal optic nerves,   about the satinder and medulla and BILATERAL cranial nerves as well as the   cerebellum and fourth ventricle and about the pineal gland. Differential   diagnosis includes an infectious or inflammatory process such as   sarcoidosis or TB. This may also be neoplastic in origin. Scattered foci   of FLAIR signal seen in the BILATERAL parietal subcortical white matter   as well as the anterior periventricular white matter and satinder. Tiny focus   of gliosis also seen in the central satinder. No acute cerebral cortical   infarct is found.   No intracranial hemorrhage is recognized.  No mass   effect is found in the brain.    The ventricles, sulci and basal cisterns appear unremarkable.    The vertebral and internal carotid arteries demonstrate expected flow   voids indicating their patency.    The orbits are unremarkable.  The paranasal sinuses are clear.  The nasal   cavity appears intact.  The nasopharynx is symmetric.  The central skull   base and petrous temporal bones are intact.  The calvarium appears   unremarkable.        MR cervical and thoracic spine with and without gadolinium    CLINICAL INFORMATION: Blurry vision, cranial nerve findings    TECHNIQUE:   Sagittal T1-weighted images, sagittal STIR images, sagittal   T2-weighted images and axial T1 and T2-weighted gradient-echo images of   the cervical and thoracic spine were obtained.   Following the   intravenous administration of 7.5 ml Gadavist fat saturated sagittal and   axialT1-weighted images were obtained.    FINDINGS:   No prior similar studies are available for review.    Cervical and thoracic vertebral alignment is intact.  Cervical and   thoracic vertebral body heights are maintained.  Marrow signal intensity   within cervical vertebral bodies and posterior elements is unremarkable.    No osseous expansion, epidural disease or paraspinal abnormality is found.    Cervical and thoracic intervertebral discs maintain intact disc heights   and signal intensity.  No central canal or foraminal compromise is   recognized.    The cervical and thoracic cord maintains intact morphology  No cord   signal intensity abnormality or focal cord lesion is appreciated.   There   is diffuse abnormal leptomeningeal enhancement of the cervical and   thoracic cord which may be infectious, inflammatory or neoplastic in   etiology.  The cervical medullary junction shows nodular leptomeningeal   enhancement.      IMPRESSION:    MR brain: Scattered nodular leptomeningeal enhancement in the anterior   inferomedial frontal lobes and scattered at the convexity, BILATERAL MCA   cisterns, about the optic chiasm and proximal optic nerves, about the   satinder and medulla and BILATERAL cranial nerves as well as the cerebellum   and fourth ventricle and about the pineal gland. Differential diagnosis   includes an infectious or inflammatory process such as sarcoidosis or TB.   This may also be neoplastic in origin. Scattered foci of FLAIR signal   seen in the BILATERAL parietal subcortical white matter as well as the   anterior periventricular white matter and satinder. Tiny focus of gliosis   also seen in the central satinder.    MR cervical/thoracic spine:   diffuse abnormal leptomeningeal enhancement   of the cervical and thoracic cordwhich may be infectious, inflammatory   or neoplastic in etiology.  The cervical medullary junction shows nodular   leptomeningeal enhancement. Clinical workup is needed.    --- End of Report ---            ALLA NINO MD; Attending Radiologist  This document has been electronically signed. Feb 20 2023  8:35PM    < end of copied text >      
  MEDICAL ATTENDING NOTE  Patient is a 32y old  Female who presents with a chief complaint of intermittent dizziness and weakness, falling to left side.  She was found to have a left dilated pupil.     HPI:  32 year old female with no significant PMH presents with intermittent dizziness and blurred vision for 2 weeks. Patient states that two weeks ago she started to have double vision and dizziness. pt states the sxs were intermittent and she went to see a neurologist who recommended an MRI. patient also started to have intermittent numbness, tingling, and BLE weaknesss four days ago. She had an opthalmology appt today and was found to have a left dilated pupil and was told to come to the ED. Pt currently still has continued bilateral blurry vision, that has not worsened. Currently denies any headaches, dizziness, numbness, tingling, weakness, chest pain, abdominal pain, or change in bowel habits. No recent travel, or illnesses. No family hx of neurological disorders  (18 Feb 2023 22:49)      INTERVAL HPI/OVERNIGHT EVENTS: no new complaints    MEDICATIONS  (STANDING):  influenza   Vaccine 0.5 milliLiter(s) IntraMuscular once    MEDICATIONS  (PRN):      __________________________________________________  REVIEW OF SYSTEMS:    CONSTITUTIONAL: No fever,   EYES: no acute visual disturbances  NECK: No pain or stiffness  RESPIRATORY: No cough; No shortness of breath  CARDIOVASCULAR: No chest pain, no palpitations  GASTROINTESTINAL: No pain. No nausea or vomiting; No diarrhea   NEUROLOGICAL: No headache or numbness, no tremors  MUSCULOSKELETAL: No joint pain, no muscle pain  GENITOURINARY: no dysuria, no frequency, no hesitancy  PSYCHIATRY: no depression , no anxiety  ALL OTHER  ROS negative        Vital Signs Last 24 Hrs  T(C): 36.9 (19 Feb 2023 12:18), Max: 37.2 (19 Feb 2023 04:50)  T(F): 98.4 (19 Feb 2023 12:18), Max: 99 (19 Feb 2023 04:50)  HR: 122 (19 Feb 2023 12:18) (99 - 122)  BP: 111/89 (19 Feb 2023 12:18) (95/62 - 121/79)  BP(mean): --  RR: 20 (19 Feb 2023 12:18) (18 - 26)  SpO2: 100% (19 Feb 2023 12:18) (93% - 100%)    Parameters below as of 19 Feb 2023 12:18  Patient On (Oxygen Delivery Method): room air  ________________________________________________  PHYSICAL EXAM:  GENERAL: NAD  HEENT: dilated left pupil without response  NECK : supple  CHEST/LUNG: Clear to auscultation bilaterally with good air entry   HEART: S1 S2  regular; no murmurs, gallops or rubs  ABDOMEN: Soft, Nontender, Nondistended; Bowel sounds present  EXTREMITIES: no cyanosis; no edema; no calf tenderness  SKIN: warm and dry; no rash  NERVOUS SYSTEM:  Awake and alert; Oriented  to place, person and time ; no new deficits    _________________________________________________  LABS:                        13.8   10.27 )-----------( 351      ( 19 Feb 2023 07:20 )             42.1     02-19    146<H>  |  110<H>  |  4<L>  ----------------------------<  99  4.2   |  28  |  0.57    Ca    9.3      19 Feb 2023 07:20  Phos  5.0     02-19  Mg     2.5     02-19    TPro  7.5  /  Alb  3.9  /  TBili  0.4  /  DBili  x   /  AST  18  /  ALT  43  /  AlkPhos  72  02-19    PT/INR - ( 18 Feb 2023 14:10 )   PT: 14.7 sec;   INR: 1.23 ratio         PTT - ( 18 Feb 2023 14:10 )  PTT:37.4 sec                  
Patient is a 32y old  Female who presents with a chief complaint of double vision (21 Feb 2023 11:20)      HPI:    PAST MEDICAL & SURGICAL HISTORY:  No pertinent past medical history      No significant past surgical history          FAMILY HISTORY:        Social Hx:  Nonsmoker, no drug or alcohol use    MEDICATIONS  (STANDING):       Allergies    No Known Allergies    Intolerances        ROS: Pertinent positives in HPI, all other ROS were reviewed and are negative.      Vital Signs Last 24 Hrs  T(C): 36.7 (22 Feb 2023 12:20), Max: 36.7 (22 Feb 2023 12:20)  T(F): 98 (22 Feb 2023 12:20), Max: 98 (22 Feb 2023 12:20)  HR: 116 (22 Feb 2023 12:20) (116 - 116)  BP: 107/68 (22 Feb 2023 12:20) (107/68 - 107/68)  BP(mean): --  RR: 16 (22 Feb 2023 12:20) (16 - 16)  SpO2: 100% (22 Feb 2023 12:20) (100% - 100%)    Parameters below as of 22 Feb 2023 12:20  Patient On (Oxygen Delivery Method): room air            Constitutional: awake and alert.  HEENT: asymmetrical OS 7mm poorly reactive direct consensual and accomodation; OD 4.5 mm also weakle reactive EOMI,   Neck: Supple.  Respiratory: Breath sounds are clear bilaterally  Cardiovascular: S1 and S2, regular / irregular rhythm  Gastrointestinal: soft, nontender  Extremities:  no edema  Vascular: Carotid Bruit - no  Musculoskeletal: no joint swelling/tenderness, no abnormal movements  Skin: No rashes    Neurological exam:  HF: A x O x 3. Appropriately interactive, normal affect. Speech fluent, No Aphasia or paraphasic errors. Naming /repetition intact   CN: asymmetrical OS 7mm poorly reactive direct consensual and accomodation; OD 4.5 mm also weakly reactive EOMI, right FABIO with abducting eye nystagmus OS, VFF, facial sensation normal, left UMN type facial weakness, tongue midline, Palate moves equally, SCM equal bilaterally  Motor: Left pronator drift, Strength 4/5 in all left ext, normal bulk and tone, no tremor, rigidity or bradykinesia.    Sens: Left weak to light touch / PP/ VS/ JS    Reflexes: Brisker left  Coord:  No FNFA, dysmetria, ALENA intact   Gait/Balance: normal stance and steps     NIHSS: not acute          Labs:                        15.7   11.06 )-----------( 384      ( 22 Feb 2023 06:35 )             45.3     02-22    143  |  110<H>  |  6<L>  ----------------------------<  96  4.5   |  24  |  0.62    Ca    9.5      22 Feb 2023 06:35    TPro  8.3  /  Alb  3.8  /  TBili  0.3  /  DBili  x   /  AST  26  /  ALT  56  /  AlkPhos  77  02-22            Radiology report:  - CT head:    - MRI brain  < from: MR Thoracic Spine w/wo IV Cont (02.20.23 @ 20:18) >  ACC: [id number] EXAM:  MR SPINE CERVICAL WAW IC   ORDERED BY: MARCEL BISHOP     ACC: [id number] EXAM:  MR SPINE THORACIC WAW IC   ORDERED BY: MARCEL BISHOP     ACC: [id number] EXAM:  MR BRAIN WAW IC   ORDERED BY: MARCEL BISHOP     PROCEDURE DATE:  02/20/2023          INTERPRETATION:  MR brain with and without gadolinium    CLINICAL INFORMATION:    TECHNIQUE:   Sagittal and axial T1-weighted images, axial FLAIR images,   axial T2-weighted images, axial gradient echo images and axial diffusion   weighted images of the brain were obtained. Following 7.5 cc of Gadavist   administration, 0 cc discarded, isotropic volumetric and fast spin echo   T1-weighted images were obtained; this data was reformatted using image   post processing software in multiple imaging planes.    FINDINGS:   No prior similar studies are available for review.    The brain demonstrates scattered nodular leptomeningeal enhancement in   the anterior inferomedial frontal lobes and scattered at the convexity,   BILATERAL MCA cisterns, about the optic chiasm and proximal optic nerves,   about the satinder and medulla and BILATERAL cranial nerves as well as the   cerebellum and fourth ventricle and about the pineal gland. Differential   diagnosis includes an infectious or inflammatory process such as   sarcoidosis or TB. This may also be neoplastic in origin. Scattered foci   of FLAIR signal seen in the BILATERAL parietal subcortical white matter   as well as the anterior periventricular white matter and satinder. Tiny focus   of gliosis also seen in the central satinder. No acute cerebral cortical   infarct is found.   No intracranial hemorrhage is recognized.  No mass   effect is found in the brain.    The ventricles, sulci and basal cisterns appear unremarkable.    The vertebral and internal carotid arteries demonstrate expected flow   voids indicating their patency.    The orbits are unremarkable.  The paranasal sinuses are clear.  The nasal   cavity appears intact.  The nasopharynx is symmetric.  The central skull   base and petrous temporal bones are intact.  The calvarium appears   unremarkable.        MR cervical and thoracic spine with and without gadolinium    CLINICAL INFORMATION: Blurry vision, cranial nerve findings    TECHNIQUE:   Sagittal T1-weighted images, sagittal STIR images, sagittal   T2-weighted images and axial T1 and T2-weighted gradient-echo images of   the cervical and thoracic spine were obtained.   Following the   intravenous administration of 7.5 ml Gadavist fat saturated sagittal and   axialT1-weighted images were obtained.    FINDINGS:   No prior similar studies are available for review.    Cervical and thoracic vertebral alignment is intact.  Cervical and   thoracic vertebral body heights are maintained.  Marrow signal intensity   within cervical vertebral bodies and posterior elements is unremarkable.    No osseous expansion, epidural disease or paraspinal abnormality is found.    Cervical and thoracic intervertebral discs maintain intact disc heights   and signal intensity.  No central canal or foraminal compromise is   recognized.    The cervical and thoracic cord maintains intact morphology  No cord   signal intensity abnormality or focal cord lesion is appreciated.   There   is diffuse abnormal leptomeningeal enhancement of the cervical and   thoracic cord which may be infectious, inflammatory or neoplastic in   etiology.  The cervical medullary junction shows nodular leptomeningeal   enhancement.      IMPRESSION:    MR brain: Scattered nodular leptomeningeal enhancement in the anterior   inferomedial frontal lobes and scattered at the convexity, BILATERAL MCA   cisterns, about the optic chiasm and proximal optic nerves, about the   satinder and medulla and BILATERAL cranial nerves as well as the cerebellum   and fourth ventricle and about the pineal gland. Differential diagnosis   includes an infectious or inflammatory process such as sarcoidosis or TB.   This may also be neoplastic in origin. Scattered foci of FLAIR signal   seen in the BILATERAL parietal subcortical white matter as well as the   anterior periventricular white matter and satinder. Tiny focus of gliosis   also seen in the central satinder.    MR cervical/thoracic spine:   diffuse abnormal leptomeningeal enhancement   of the cervical and thoracic cordwhich may be infectious, inflammatory   or neoplastic in etiology.  The cervical medullary junction shows nodular   leptomeningeal enhancement. Clinical workup is needed.    --- End of Report ---            ALLA NINO MD; Attending Radiologist  This document has been electronically signed. Feb 20 2023  8:35PM    < end of copied text >  
NP Note discussed with Primary Attending.      Patient is a 32y old  Female who presents with a chief complaint of double vision (21 Feb 2023 11:20)      INTERVAL HPI/OVERNIGHT EVENTS: no new complaints    MEDICATIONS  (STANDING):  influenza   Vaccine 0.5 milliLiter(s) IntraMuscular once    MEDICATIONS  (PRN):      __________________________________________________  REVIEW OF SYSTEMS:    CONSTITUTIONAL: No fever,   EYES: Blurred vision,   NECK: No pain or stiffness  RESPIRATORY: No cough; No shortness of breath  CARDIOVASCULAR: No chest pain, no palpitations  GASTROINTESTINAL: No pain. No nausea or vomiting; No diarrhea   NEUROLOGICAL: No headache or numbness, no tremors  MUSCULOSKELETAL: No joint pain, no muscle pain  GENITOURINARY: no dysuria, no frequency, no hesitancy  PSYCHIATRY: no depression , no anxiety  ALL OTHER  ROS negative        Vital Signs Last 24 Hrs  T(C): 36.7 (22 Feb 2023 12:20), Max: 37.1 (21 Feb 2023 21:57)  T(F): 98 (22 Feb 2023 12:20), Max: 98.8 (22 Feb 2023 05:10)  HR: 116 (22 Feb 2023 12:20) (106 - 125)  BP: 107/68 (22 Feb 2023 12:20) (102/79 - 110/92)  BP(mean): --  RR: 16 (22 Feb 2023 12:20) (16 - 18)  SpO2: 100% (22 Feb 2023 12:20) (96% - 100%)    Parameters below as of 22 Feb 2023 12:20  Patient On (Oxygen Delivery Method): room air        ________________________________________________  PHYSICAL EXAM:  GENERAL: NAD  HEENT: Normocephalic;  conjunctivae and sclerae clear; moist mucous membranes;   NECK : supple  CHEST/LUNG: Clear to auscultation bilaterally with good air entry   HEART: S1 S2  regular; no murmurs, gallops or rubs  ABDOMEN: Soft, Nontender, Nondistended; Bowel sounds present  EXTREMITIES: no cyanosis; no edema; no calf tenderness  SKIN: warm and dry; no rash  NERVOUS SYSTEM:  Awake and alert; Oriented  to place, person and time ; no new deficits    _________________________________________________  LABS:                        15.7   11.06 )-----------( 384      ( 22 Feb 2023 06:35 )             45.3     02-22    143  |  110<H>  |  6<L>  ----------------------------<  96  4.5   |  24  |  0.62    Ca    9.5      22 Feb 2023 06:35  Phos  3.6     02-21  Mg     2.6     02-21    TPro  8.3  /  Alb  3.8  /  TBili  0.3  /  DBili  x   /  AST  26  /  ALT  56  /  AlkPhos  77  02-22        CAPILLARY BLOOD GLUCOSE            RADIOLOGY & ADDITIONAL TESTS:    ACC: [id number] EXAM:  MR SPINE CERVICAL WAW IC   ORDERED BY: MARCEL BISHOP     ACC: [id number] EXAM:  MR SPINE THORACIC WAW IC   ORDERED BY: MARCEL BISHOP     ACC: [id number] EXAM:  MR BRAIN WAW IC   ORDERED BY: MARCEL BISHOP     PROCEDURE DATE:  02/20/2023          INTERPRETATION:  MR brain with and without gadolinium    CLINICAL INFORMATION:    TECHNIQUE:   Sagittal and axial T1-weighted images, axial FLAIR images,   axial T2-weighted images, axial gradient echo images and axial diffusion   weighted images of the brain were obtained. Following 7.5 cc of Gadavist   administration, 0 cc discarded, isotropic volumetric and fast spin echo   T1-weighted images were obtained; this data was reformatted using image   post processing software in multiple imaging planes.    FINDINGS:   No prior similar studies are available for review.    The brain demonstrates scattered nodular leptomeningeal enhancement in   the anterior inferomedial frontal lobes and scattered at the convexity,   BILATERAL MCA cisterns, about the optic chiasm and proximal optic nerves,   about the satinder and medulla and BILATERAL cranial nerves as well as the   cerebellum and fourth ventricle and about the pineal gland. Differential   diagnosis includes an infectious or inflammatory process such as   sarcoidosis or TB. This may also be neoplastic in origin. Scattered foci   of FLAIR signal seen in the BILATERAL parietal subcortical white matter   as well as the anterior periventricular white matter and satinder. Tiny focus   of gliosis also seen in the central satinder. No acute cerebral cortical   infarct is found.   No intracranial hemorrhage is recognized.  No mass   effect is found in the brain.    The ventricles, sulci and basal cisterns appear unremarkable.    The vertebral and internal carotid arteries demonstrate expected flow   voids indicating their patency.    The orbits are unremarkable.  The paranasal sinuses are clear.  The nasal   cavity appears intact.  The nasopharynx is symmetric.  The central skull   base and petrous temporal bones are intact.  The calvarium appears   unremarkable.        MR cervical and thoracic spine with and without gadolinium    CLINICAL INFORMATION: Blurry vision, cranial nerve findings    TECHNIQUE:   Sagittal T1-weighted images, sagittal STIR images, sagittal   T2-weighted images and axial T1 and T2-weighted gradient-echo images of   the cervical and thoracic spine were obtained.   Following the   intravenous administration of 7.5 ml Gadavist fat saturated sagittal and   axialT1-weighted images were obtained.    FINDINGS:   No prior similar studies are available for review.    Cervical and thoracic vertebral alignment is intact.  Cervical and   thoracic vertebral body heights are maintained.  [place]arrow signal intensity   within cervical vertebral bodies and posterior elements is unremarkable.    No osseous expansion, epidural disease or paraspinal abnormality is found.    Cervical and thoracic intervertebral discs maintain intact disc heights   and signal intensity.  No central canal or foraminal compromise is   recognized.    The cervical and thoracic cord maintains intact morphology  No cord   signal intensity abnormality or focal cord lesion is appreciated.   There   is diffuse abnormal leptomeningeal enhancement of the cervical and   thoracic cord which may be infectious, inflammatory or neoplastic in   etiology.  The cervical medullary junction shows nodular leptomeningeal   enhancement.      IMPRESSION:    MR brain: Scattered nodular leptomeningeal enhancement in the anterior   inferomedial frontal lobes and scattered at the convexity, BILATERAL MCA   cisterns, about the optic chiasm and proximal optic nerves, about the   satinder and medulla and BILATERAL cranial nerves as well as the cerebellum   and fourth ventricle and about the pineal gland. Differential diagnosis   includes an infectious or inflammatory process such as sarcoidosis or TB.   This may also be neoplastic in origin. Scattered foci of FLAIR signal   seen in the BILATERAL parietal subcortical white matter as well as the   anterior periventricular white matter and satinder. Tiny focus of gliosis   also seen in the central satinder.    MR cervical/thoracic spine:   diffuse abnormal leptomeningeal enhancement   of the cervical and thoracic cordwhich may be infectious, inflammatory   or neoplastic in etiology.  The cervical medullary junction shows nodular   leptomeningeal enhancement. Clinical workup is needed.    --- End of Report ---            ALLA NINO MD; Attending Radiologist  This document has been electronically signed. Feb 20 2023  8:35PM    ACC: [id number] EXAM:  MR SPINE CERVICAL WAW IC   ORDERED BY: MARCEL BISHOP     ACC: [id number] EXAM:  MR SPINE THORACIC WAW IC   ORDERED BY: MARCEL BISHOP     ACC: [id number] EXAM:  MR BRAIN WAW IC   ORDERED BY: MARCEL BISHOP     PROCEDURE DATE:  02/20/2023          INTERPRETATION:  MR brain with and without gadolinium    CLINICAL INFORMATION:    TECHNIQUE:   Sagittal and axial T1-weighted images, axial FLAIR images,   axial T2-weighted images, axial gradient echo images and axial diffusion   weighted images of the brain were obtained. Following 7.5 cc of Gadavist   administration, 0 cc discarded, isotropic volumetric and fast spin echo   T1-weighted images were obtained; this data was reformatted using image   post processing software in multiple imaging planes.    FINDINGS:   No prior similar studies are available for review.    The brain demonstrates scattered nodular leptomeningeal enhancement in   the anterior inferomedial frontal lobes and scattered at the convexity,   BILATERAL MCA cisterns, about the optic chiasm and proximal optic nerves,   about the satinder and medulla and BILATERAL cranial nerves as well as the   cerebellum and fourth ventricle and about the pineal gland. Differential   diagnosis includes an infectious or inflammatory process such as   sarcoidosis or TB. This may also be neoplastic in origin. Scattered foci   of FLAIR signal seen in the BILATERAL parietal subcortical white matter   as well as the anterior periventricular white matter and satinder. Tiny focus   of gliosis also seen in the central satinder. No acute cerebral cortical   infarct is found.   No intracranial hemorrhage is recognized.  No mass   effect is found in the brain.    The ventricles, sulci and basal cisterns appear unremarkable.    The vertebral and internal carotid arteries demonstrate expected flow   voids indicating their patency.    The orbits are unremarkable.  The paranasal sinuses are clear.  The nasal   cavity appears intact.  The nasopharynx is symmetric.  The central skull   base and petrous temporal bones are intact.  The calvarium appears   unremarkable.        MR cervical and thoracic spine with and without gadolinium    CLINICAL INFORMATION: Blurry vision, cranial nerve findings    TECHNIQUE:   Sagittal T1-weighted images, sagittal STIR images, sagittal   T2-weighted images and axial T1 and T2-weighted gradient-echo images of   the cervical and thoracic spine were obtained.   Following the   intravenous administration of 7.5 ml Gadavist fat saturated sagittal and   axialT1-weighted images were obtained.    FINDINGS:   No prior similar studies are available for review.    Cervical and thoracic vertebral alignment is intact.  Cervical and   thoracic vertebral body heights are maintained.  Marrow signal intensity   within cervical vertebral bodies and posterior elements is unremarkable.    No osseous expansion, epidural disease or paraspinal abnormality is found.    Cervical and thoracic intervertebral discs maintain intact disc heights   and signal intensity.  No central canal or foraminal compromise is   recognized.    The cervical and thoracic cord maintains intact morphology  No cord   signal intensity abnormality or focal cord lesion is appreciated.   There   is diffuse abnormal leptomeningeal enhancement of the cervical and   thoracic cord which may be infectious, inflammatory or neoplastic in   etiology.  The cervical medullary junction shows nodular leptomeningeal   enhancement.      IMPRESSION:    MR brain: Scattered nodular leptomeningeal enhancement in the anterior   inferomedial frontal lobes and scattered at the convexity, BILATERAL MCA   cisterns, about the optic chiasm and proximal optic nerves, about the   satinder and medulla and BILATERAL cranial nerves as well as the cerebellum   and fourth ventricle and about the pineal gland. Differential diagnosis   includes an infectious or inflammatory process such as sarcoidosis or TB.   This may also be neoplastic in origin. Scattered foci of FLAIR signal   seen in the BILATERAL parietal subcortical white matter as well as the   anterior periventricular white matter and satinder. Tiny focus of gliosis   also seen in the central satinder.    MR cervical/thoracic spine:   diffuse abnormal leptomeningeal enhancement   of the cervical and thoracic cordwhich may be infectious, inflammatory   or neoplastic in etiology.  The cervical medullary junction shows nodular   leptomeningeal enhancement. Clinical workup is needed.    --- End of Report ---            ALLA NINO MD; Attending Radiologist  This document has been electronically signed. Feb 20 2023  8:35PM    ACC: [id number] EXAM:  CT VENOGRAM BRAIN (W)AW IC   ORDERED BY: PHILLIP WADE     ACC: [id number] EXAM:  CT ANGIO NECK (W)AW IC   ORDERED BY: PHILLIP WADE     ACC: [id number] EXAM:  CT ANGIO BRAIN (W)AW IC   ORDERED BY: PHILLIP WADE     PROCEDURE DATE:  02/18/2023          INTERPRETATION:  HISTORY: Vision changes, right-sided ptosis, left   dilated pupil. Dizziness..   32-year-old female with no pertinent past medical history presents with   multiple medical complaints over the past 2 weeks. Patient reports   intermittent bilateral headaches associated with bilateral eye blurry   vision, double vision, and dizziness. Patient states she saw a   neurologist and was scheduled to have MRI outpatient today. Patient   states she saw an eye doctor today, noted to have left side dilated   pupil, since the emergency department to ru  Description: A CT scan of the head with and without intravenous contrast   and a CT angiogram study of the neck and head were performed. A CT   venogram of the head was also performed.    The head CT was performed with axial images with coronal and sagittal   reformatted series. The CT angiogram study and the CT venogram study were   performed with axial thin section images with volume rendered 2-D and 3-D  maximum intensity projection MIP reformatted series.    90 cc intravenous Omnipaque 350 contrast was administered, 10 cc contrast   was discarded.    Head CT:    An 8mm rounded hypodensity demonstrating CSF density involves the left   lentiform nucleus most likely reflecting a large perivascular space, less   likely a chronic lacunar infarct.    There is no evidence for enhancing intracranial mass, acute hemorrhage,   or hydrocephalus. No loss of the gray matter white matter junction is   appreciated.    CTA NECK:    The aortic arch and origins of the great vessels appear unremarkable.    There is no internal carotid artery stenosis by NASCET criteria. 0%   stenosis is noted.    There is no evidence for vertebral artery stenosis.    There is no evidence for carotid or vertebral artery dissection.    CTA HEAD:    There is no evidence for focal stenosis, major vessel occlusion, or   aneurysm about the [place]. Tiny aneurysms can be beyond the   resolution of CTA imaging technique.    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 The hypoplastic left transverse and sigmoid sinus however are   patent, without filling defects noted. There is no evidence for dural   venous sinus thrombosis or cortical vein thrombosis.    IMPRESSION:    Head CT:    No gross acute intracranial abnormality is noted. If the patient has new   and persistent symptoms, considerfollow-up brain and orbital MRI for   further workup.    An 8mm rounded hypodensity demonstrating CSF density involves the left   lentiform nucleus most likely reflecting a large perivascular space, less   likely a chronic lacunar infarct.    CT angiogram neck:    No evidence for carotid or vertebral artery stenosis or dissection.    CT angiogram head:    No evidence for focal stenosis, major vessel occlusion, or aneurysm about   the [place].    CT venogram head:    No evidence for duralvenous sinus thrombosis.    --- End of Report ---            GISEL SUAZO MD; Attending Radiologist  This document has been electronically signed. Feb 18 2023  7:40PM    Imaging  Reviewed:  YES/NO    Consultant(s) Notes Reviewed:   YES/ No      Plan of care was discussed with patient and /or primary care giver; all questions and concerns were addressed 
  MEDICAL ATTENDING NOTE  Patient is a 32y old  Female who presents with a chief complaint of     HPI:  32 year old female with no significant PMH presents with intermittent dizziness and blurred vision for 2 weeks. Patient states that two weeks ago she started to have double vision and dizziness. pt states the sxs were intermittent and she went to see a neurologist who recommended an MRI. patient also started to have intermittent numbness, tingling, and BLE weaknesss four days ago. She had an opthalmology appt today and was found to have a left dilated pupil and was told to come to the ED. Pt currently still has continued bilateral blurry vision, that has not worsened. Currently denies any headaches, dizziness, numbness, tingling, weakness, chest pain, abdominal pain, or change in bowel habits. No recent travel, or illnesses. No family hx of neurological disorders  (18 Feb 2023 22:49)      INTERVAL HPI/OVERNIGHT EVENTS: no new complaints    MEDICATIONS  (STANDING):  influenza   Vaccine 0.5 milliLiter(s) IntraMuscular once    MEDICATIONS  (PRN):      __________________________________________________  REVIEW OF SYSTEMS:    CONSTITUTIONAL: No fever,   EYES: no acute visual disturbances  NECK: No pain or stiffness  RESPIRATORY: No cough; No shortness of breath  CARDIOVASCULAR: No chest pain, no palpitations  GASTROINTESTINAL: No pain. No nausea or vomiting; No diarrhea   NEUROLOGICAL: No headache or numbness, no tremors  MUSCULOSKELETAL: No joint pain, no muscle pain  GENITOURINARY: no dysuria, no frequency, no hesitancy  PSYCHIATRY: no depression , no anxiety  ALL OTHER  ROS negative        Vital Signs Last 24 Hrs  T(C): 36.7 (20 Feb 2023 13:05), Max: 36.7 (19 Feb 2023 22:19)  T(F): 98.1 (20 Feb 2023 13:05), Max: 98.1 (20 Feb 2023 05:44)  HR: 120 (20 Feb 2023 13:05) (103 - 120)  BP: 120/80 (20 Feb 2023 13:05) (106/69 - 120/80)  BP(mean): --  RR: 18 (20 Feb 2023 13:05) (18 - 19)  SpO2: 100% (20 Feb 2023 13:05) (97% - 100%)    Parameters below as of 20 Feb 2023 13:05  Patient On (Oxygen Delivery Method): room air        ________________________________________________  PHYSICAL EXAM:  GENERAL: NAD  HEENT: Normocephalic;  facial asymmetry; conjunctivae and sclerae clear; moist mucous membranes;  mydriasis, left pupil  NECK : supple  CHEST/LUNG: Clear to auscultation bilaterally with good air entry   HEART: S1 S2  regular; no murmurs, gallops or rubs  ABDOMEN: Soft, Nontender, Nondistended; Bowel sounds present  EXTREMITIES: no cyanosis; no edema; no calf tenderness  SKIN: warm and dry; no rash  NERVOUS SYSTEM:  Awake and alert; Oriented  to place, person and time ; no new deficits    _________________________________________________  LABS:                        13.8   10.27 )-----------( 351      ( 19 Feb 2023 07:20 )             42.1     02-19    146<H>  |  110<H>  |  4<L>  ----------------------------<  99  4.2   |  28  |  0.57    Ca    9.3      19 Feb 2023 07:20  Phos  5.0     02-19  Mg     2.5     02-19    TPro  7.5  /  Alb  3.9  /  TBili  0.4  /  DBili  x   /  AST  18  /  ALT  43  /  AlkPhos  72  02-19                      
NP Note discussed with Primary Attending.    Patient is a 32y old  Female who presents with a chief complaint of     INTERVAL HPI/OVERNIGHT EVENTS: no new complaints    MEDICATIONS  (STANDING):  influenza   Vaccine 0.5 milliLiter(s) IntraMuscular once    MEDICATIONS  (PRN):      __________________________________________________  REVIEW OF SYSTEMS:    CONSTITUTIONAL: No fever,   EYES: blurred vision  NECK: No pain or stiffness  RESPIRATORY: No cough; No shortness of breath  CARDIOVASCULAR: No chest pain, no palpitations  GASTROINTESTINAL: No pain. No nausea or vomiting; No diarrhea   NEUROLOGICAL: No headache or numbness, no tremors  MUSCULOSKELETAL: Lt facial weakness, No joint pain, no muscle pain  GENITOURINARY: no dysuria, no frequency, no hesitancy  PSYCHIATRY: no depression , no anxiety  ALL OTHER  ROS negative        Vital Signs Last 24 Hrs  T(C): 36.8 (21 Feb 2023 14:09), Max: 37.1 (21 Feb 2023 05:00)  T(F): 98.3 (21 Feb 2023 14:09), Max: 98.7 (21 Feb 2023 05:00)  HR: 125 (21 Feb 2023 14:09) (108 - 125)  BP: 110/92 (21 Feb 2023 14:09) (100/64 - 112/67)  BP(mean): --  RR: 17 (21 Feb 2023 14:09) (17 - 20)  SpO2: 100% (21 Feb 2023 14:09) (96% - 100%)    Parameters below as of 21 Feb 2023 14:09  Patient On (Oxygen Delivery Method): room air        ________________________________________________  PHYSICAL EXAM:  GENERAL: NAD  HEENT: Normocephalic;  conjunctivae and sclerae clear; moist mucous membranes;   NECK : supple  CHEST/LUNG: Clear to auscultation bilaterally with good air entry   HEART: S1 S2  regular; no murmurs, gallops or rubs  ABDOMEN: Soft, Nontender, Nondistended; Bowel sounds present  EXTREMITIES: no cyanosis; no edema; no calf tenderness  SKIN: warm and dry; no rash  NERVOUS SYSTEM:  Awake and alert; Oriented  to place, person and time ; no new deficits    _________________________________________________  LABS:    02-21    144  |  110<H>  |  5<L>  ----------------------------<  101<H>  3.9   |  24  |  0.71    Ca    9.5      21 Feb 2023 06:05  Phos  3.6     02-21  Mg     2.6     02-21    TPro  7.9  /  Alb  3.8  /  TBili  0.3  /  DBili  x   /  AST  21  /  ALT  48  /  AlkPhos  73  02-21        CAPILLARY BLOOD GLUCOSE            RADIOLOGY & ADDITIONAL TESTS:  ACC: [id number] EXAM:  MR SPINE CERVICAL WAW IC   ORDERED BY: MARCEL BISHOP     ACC: [id number] EXAM:  MR SPINE THORACIC WAW IC   ORDERED BY: MARCEL BISHOP     ACC: [id number] EXAM:  MR BRAIN WAW IC   ORDERED BY: MARCEL BISHOP     PROCEDURE DATE:  02/20/2023          INTERPRETATION:  MR brain with and without gadolinium    CLINICAL INFORMATION:    TECHNIQUE:   Sagittal and axial T1-weighted images, axial FLAIR images,   axial T2-weighted images, axial gradient echo images and axial diffusion   weighted images of the brain were obtained. Following 7.5 cc of Gadavist   administration, 0 cc discarded, isotropic volumetric and fast spin echo   T1-weighted images were obtained; this data was reformatted using image   post processing software in multiple imaging planes.    FINDINGS:   No prior similar studies are available for review.    The brain demonstrates scattered nodular leptomeningeal enhancement in   the anterior inferomedial frontal lobes and scattered at the convexity,   BILATERAL MCA cisterns, about the optic chiasm and proximal optic nerves,   about the satinder and medulla and BILATERAL cranial nerves as well as the   cerebellum and fourth ventricle and about the pineal gland. Differential   diagnosis includes an infectious or inflammatory process such as   sarcoidosis or TB. This may also be neoplastic in origin. Scattered foci   of FLAIR signal seen in the BILATERAL parietal subcortical white matter   as well as the anterior periventricular white matter and satinder. Tiny focus   of gliosis also seen in the central satinder. No acute cerebral cortical   infarct is found.   No intracranial hemorrhage is recognized.  No mass   effect is found in the brain.    The ventricles, sulci and basal cisterns appear unremarkable.    The vertebral and internal carotid arteries demonstrate expected flow   voids indicating their patency.    The orbits are unremarkable.  The paranasal sinuses are clear.  The nasal   cavity appears intact.  The nasopharynx is symmetric.  The central skull   base and petrous temporal bones are intact.  The calvarium appears   unremarkable.        MR cervical and thoracic spine with and without gadolinium    CLINICAL INFORMATION: Blurry vision, cranial nerve findings    TECHNIQUE:   Sagittal T1-weighted images, sagittal STIR images, sagittal   T2-weighted images and axial T1 and T2-weighted gradient-echo images of   the cervical and thoracic spine were obtained.   Following the   intravenous administration of 7.5 ml Gadavist fat saturated sagittal and   axialT1-weighted images were obtained.    FINDINGS:   No prior similar studies are available for review.    Cervical and thoracic vertebral alignment is intact.  Cervical and   thoracic vertebral body heights are maintained.  [place] signal intensity   within cervical vertebral bodies and posterior elements is unremarkable.    No osseous expansion, epidural disease or paraspinal abnormality is found.    Cervical and thoracic intervertebral discs maintain intact disc heights   and signal intensity.  No central canal or foraminal compromise is   recognized.    The cervical and thoracic cord maintains intact morphology  No cord   signal intensity abnormality or focal cord lesion is appreciated.   There   is diffuse abnormal leptomeningeal enhancement of the cervical and   thoracic cord which may be infectious, inflammatory or neoplastic in   etiology.  The cervical medullary junction shows nodular leptomeningeal   enhancement.      IMPRESSION:    MR brain: Scattered nodular leptomeningeal enhancement in the anterior   inferomedial frontal lobes and scattered at the convexity, BILATERAL MCA   cisterns, about the optic chiasm and proximal optic nerves, about the   satinder and medulla and BILATERAL cranial nerves as well as the cerebellum   and fourth ventricle and about the pineal gland. Differential diagnosis   includes an infectious or inflammatory process such as sarcoidosis or TB.   This may also be neoplastic in origin. Scattered foci of FLAIR signal   seen in the BILATERAL parietal subcortical white matter as well as the   anterior periventricular white matter and satinder. Tiny focus of gliosis   also seen in the central satinder.    MR cervical/thoracic spine:   diffuse abnormal leptomeningeal enhancement   of the cervical and thoracic cordwhich may be infectious, inflammatory   or neoplastic in etiology.  The cervical medullary junction shows nodular   leptomeningeal enhancement. Clinical workup is needed.    --- End of Report ---            ALLA NINO MD; Attending Radiologist  This document has been electronically signed. Feb 20 2023  8:35PM    ACC: [id number] EXAM:  CT VENOGRAM BRAIN (W)AW IC   ORDERED BY: PHILLIP WADE     ACC: [id number] EXAM:  CT ANGIO NECK (W)AW IC   ORDERED BY: PHILLIP WADE     ACC: [id number] EXAM:  CT ANGIO BRAIN (W)AW IC   ORDERED BY: PHILLIP WADE     PROCEDURE DATE:  02/18/2023          INTERPRETATION:  HISTORY: Vision changes, right-sided ptosis, left   dilated pupil. Dizziness..   32-year-old female with no pertinent past medical history presents with   multiple medical complaints over the past 2 weeks. Patient reports   intermittent bilateral headaches associated with bilateral eye blurry   vision, double vision, and dizziness. Patient states she saw a   neurologist and was scheduled to have MRI outpatient today. Patient   states she saw an eye doctor today, noted to have left side dilated   pupil, since the emergency department to ru  Description: A CT scan of the head with and without intravenous contrast   and a CT angiogram study of the neck and head were performed. A CT   venogram of the head was also performed.    The head CT was performed with axial images with coronal and sagittal   reformatted series. The CT angiogram study and the CT venogram study were   performed with axial thin section images with volume rendered 2-D and 3-D  maximum intensity projection MIP reformatted series.    90 cc intravenous Omnipaque 350 contrast was administered, 10 cc contrast   was discarded.    Head CT:    An 8mm rounded hypodensity demonstrating CSF density involves the left   lentiform nucleus most likely reflecting a large perivascular space, less   likely a chronic lacunar infarct.    There is no evidence for enhancing intracranial mass, acute hemorrhage,   or hydrocephalus. No loss of the gray matter white matter junction is   appreciated.    CTA NECK:    The aortic arch and origins of the great vessels appear unremarkable.    There is no internal carotid artery stenosis by NASCET criteria. 0%   stenosis is noted.    There is no evidence for vertebral artery stenosis.    There is no evidence for carotid or vertebral artery dissection.    CTA HEAD:    There is no evidence for focal stenosis, major vessel occlusion, or   aneurysm about the [place]. Tiny aneurysms can be beyond the   resolution of CTA imaging technique.    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 The hypoplastic left transverse and sigmoid sinus however are   patent, without filling defects noted. There is no evidence for dural   venous sinus thrombosis or cortical vein thrombosis.    IMPRESSION:    Head CT:    No gross acute intracranial abnormality is noted. If the patient has new   and persistent symptoms, considerfollow-up brain and orbital MRI for   further workup.    An 8mm rounded hypodensity demonstrating CSF density involves the left   lentiform nucleus most likely reflecting a large perivascular space, less   likely a chronic lacunar infarct.    CT angiogram neck:    No evidence for carotid or vertebral artery stenosis or dissection.    CT angiogram head:    No evidence for focal stenosis, major vessel occlusion, or aneurysm about   the [place].    CT venogram head:    No evidence for duralvenous sinus thrombosis.    --- End of Report ---            GISEL SUAZO MD; Attending Radiologist  This document has been electronically signed. Feb 18 2023  7:40PM      Imaging  Reviewed:  YES/NO    Consultant(s) Notes Reviewed:   YES/ No      Plan of care was discussed with patient and /or primary care giver; all questions and concerns were addressed

## 2023-02-24 PROBLEM — Z78.9 OTHER SPECIFIED HEALTH STATUS: Chronic | Status: ACTIVE | Noted: 2023-02-18

## 2023-02-24 LAB
ANA TITR SER: NEGATIVE — SIGNIFICANT CHANGE UP
NON-GYNECOLOGICAL CYTOLOGY STUDY: SIGNIFICANT CHANGE UP
TM INTERPRETATION: SIGNIFICANT CHANGE UP

## 2023-02-25 LAB
CULTURE RESULTS: NO GROWTH — SIGNIFICANT CHANGE UP
SPECIMEN SOURCE: SIGNIFICANT CHANGE UP
VDRL CSF-TITR: SIGNIFICANT CHANGE UP

## 2023-02-27 LAB
ACE SERPL-CCNC: 36 U/L — SIGNIFICANT CHANGE UP (ref 14–82)
GAMMA INTERFERON BACKGROUND BLD IA-ACNC: 0.02 IU/ML — SIGNIFICANT CHANGE UP
M TB IFN-G BLD-IMP: NEGATIVE — SIGNIFICANT CHANGE UP
M TB IFN-G CD4+ BCKGRND COR BLD-ACNC: 0 IU/ML — SIGNIFICANT CHANGE UP
M TB IFN-G CD4+CD8+ BCKGRND COR BLD-ACNC: 0 IU/ML — SIGNIFICANT CHANGE UP
QUANT TB PLUS MITOGEN MINUS NIL: >10 IU/ML — SIGNIFICANT CHANGE UP

## 2023-03-01 ENCOUNTER — APPOINTMENT (OUTPATIENT)
Dept: NEUROLOGY | Facility: CLINIC | Age: 33
End: 2023-03-01

## 2023-03-01 LAB
B BURGDOR DNA SPEC QL NAA+PROBE: NEGATIVE — SIGNIFICANT CHANGE UP
MBP CSF-MCNC: 13.7 NG/ML — HIGH (ref 0–2.9)
OLIGOCLONAL BANDS CSF ELPH-IMP: SIGNIFICANT CHANGE UP

## 2023-03-03 LAB
DSDNA AB SER QL CLIF: NEGATIVE — SIGNIFICANT CHANGE UP
MOG AB CSF QL CBA IFA: NEGATIVE — SIGNIFICANT CHANGE UP

## 2023-03-07 LAB — INNER EAR 68KD AB FLD QL: 7.1 U/L — HIGH (ref 0–2.5)

## 2023-03-17 LAB
AMPA-R AB CBA, CSF: NEGATIVE — SIGNIFICANT CHANGE UP
AMPHIPHYSIN AB TITR CSF: NEGATIVE — SIGNIFICANT CHANGE UP
CASPR2-IGG CBA, CSF: NEGATIVE — SIGNIFICANT CHANGE UP
CV2 IGG TITR CSF: NEGATIVE — SIGNIFICANT CHANGE UP
DPPX ANTIBODY IFA, CSF: NEGATIVE — SIGNIFICANT CHANGE UP
GABA-B-R AB CBA, CSF: NEGATIVE — SIGNIFICANT CHANGE UP
GAD65 AB CSF-SCNC: 0 NMOL/L — SIGNIFICANT CHANGE UP
GFAP IFA, CSF: NEGATIVE — SIGNIFICANT CHANGE UP
GLIAL NUC TYPE 1 AB TITR CSF: NEGATIVE — SIGNIFICANT CHANGE UP
HU1 AB TITR CSF IF: NEGATIVE — SIGNIFICANT CHANGE UP
HU2 AB TITR CSF IF: NEGATIVE — SIGNIFICANT CHANGE UP
HU3 AB TITR CSF: NEGATIVE — SIGNIFICANT CHANGE UP
IGLON5 IFA, CSF: NEGATIVE — SIGNIFICANT CHANGE UP
IMMUNOLOGIST REVIEW: SIGNIFICANT CHANGE UP
LGI1-IGG CBA, CSF: NEGATIVE — SIGNIFICANT CHANGE UP
MGLUR1 AB IFA, CSF: NEGATIVE — SIGNIFICANT CHANGE UP
PCA-TR AB TITR CSF: NEGATIVE — SIGNIFICANT CHANGE UP

## 2023-03-25 LAB
CULTURE RESULTS: SIGNIFICANT CHANGE UP
SPECIMEN SOURCE: SIGNIFICANT CHANGE UP

## 2023-03-27 ENCOUNTER — APPOINTMENT (OUTPATIENT)
Dept: NEUROLOGY | Facility: CLINIC | Age: 33
End: 2023-03-27
Payer: COMMERCIAL

## 2023-03-27 VITALS
BODY MASS INDEX: 29.83 KG/M2 | SYSTOLIC BLOOD PRESSURE: 114 MMHG | DIASTOLIC BLOOD PRESSURE: 79 MMHG | HEIGHT: 61 IN | HEART RATE: 134 BPM | WEIGHT: 158 LBS

## 2023-03-27 PROCEDURE — 99214 OFFICE O/P EST MOD 30 MIN: CPT

## 2023-03-27 NOTE — HISTORY OF PRESENT ILLNESS
[FreeTextEntry1] : 32-year-old woman who initially presented for initial visit 2/13/2023 with headache, room-spinning dizziness, diplopia, and right-sided ptosis for 1 week.  \par \par At that time I was concerned about a stroke and recommended urgent MRI and MRA.  A few days later (before MRI was completed) she developed bilateral lower extremity weakness, numbness, and tingling as well as a dilated right pupil noticed on exam by her ophthalmologist, who sent her to ED.  She presented to ED at [facility] and was admitted for further work up.  CT head and CTA head/neck were unremarkable.  MRI brain w/wo contrast showed a few punctate T2 hyperintensities as well as diffuse leptomeningeal enhancement.  MRI C and T spine showed leptomeningeal enhancement as well.  Lumbar puncture was completed -- WBC 34 (85% lymphocytes), protein 207, glucose 16, MBP 13.7 (high), ACE 7.1 (high), IgG index elevated, OCBs negative, VDRL negative, AFB negative, cytology/flow negative.  She presents today for follow up of results and to discuss next steps.

## 2023-03-27 NOTE — PHYSICAL EXAM
[FreeTextEntry1] : General: no apparent distress\par Mental Status: awake and alert, speech fluent and prosodic with no paraphasic errors\par Cranial Nerves: R ptosis, tracks in all directions, face symmetric, no dysarthria\par Motor: no abnormal movements, no orbiting or pronator drift\par Coordination: no dysmetria on finger-nose-finger testing\par Gait: narrow base, normal stance and stride, no Romberg\par

## 2023-03-27 NOTE — ASSESSMENT
[FreeTextEntry1] : 32-year-old woman with recent onset of multiple central and peripheral neurological symptoms, with work up notable for diffuse leptomeningeal enhancement without parenchymal lesions, lymphocytic pleocytosis, elevated CSF ACE, and negative infection and malignancy studies.  I suspect this is an inflammatory condition such as neurosarcoidosis.  Will refer to Dr. Vyas for additional work up and management.

## 2023-04-03 ENCOUNTER — NON-APPOINTMENT (OUTPATIENT)
Age: 33
End: 2023-04-03

## 2023-04-03 NOTE — HISTORY OF PRESENT ILLNESS
[FreeTextEntry1] : She was asked to call to follow up and did not call. I wished to discuss the result of the LP,  which found 34 nucleated cells and 1 RBC with majority (85%) lymphocytes,  markedly raised CSF protein, raised IgG synthesis, raised IgG index, negative encephalopathy profile, normal serum ACE and normal CXR. \par She had insisted on being discharged from the hospital before a full diagnosis was established in order to complete a planned [place] trip. \par Her MRI record shows abnormal nodular enhancement of leptomeningeal meninges . She had presented with dizziness and double vision pyramidal tract sings and FABIO. All cultures were negative. \par She needs a second LP or leptomeningeal biopsy for diagnosis- lymphoma rather than sarcoidosis is the more likely diagnosis. Alternatively she could have an aggressive early form of progressive multiple sclerosis.\par

## 2023-04-07 ENCOUNTER — APPOINTMENT (OUTPATIENT)
Dept: OBGYN | Facility: CLINIC | Age: 33
End: 2023-04-07

## 2023-04-12 LAB
CULTURE RESULTS: SIGNIFICANT CHANGE UP
SPECIMEN SOURCE: SIGNIFICANT CHANGE UP

## 2023-04-20 ENCOUNTER — APPOINTMENT (OUTPATIENT)
Dept: NEUROLOGY | Facility: CLINIC | Age: 33
End: 2023-04-20
Payer: COMMERCIAL

## 2023-04-20 VITALS
HEIGHT: 61 IN | HEART RATE: 120 BPM | WEIGHT: 158 LBS | DIASTOLIC BLOOD PRESSURE: 80 MMHG | BODY MASS INDEX: 29.83 KG/M2 | SYSTOLIC BLOOD PRESSURE: 127 MMHG

## 2023-04-20 DIAGNOSIS — H51.23: ICD-10-CM

## 2023-04-20 PROCEDURE — 99215 OFFICE O/P EST HI 40 MIN: CPT

## 2023-04-20 PROCEDURE — G2212 PROLONG OUTPT/OFFICE VIS: CPT

## 2023-04-20 PROCEDURE — 99417 PROLNG OP E/M EACH 15 MIN: CPT

## 2023-04-25 ENCOUNTER — APPOINTMENT (OUTPATIENT)
Dept: NEUROLOGY | Facility: CLINIC | Age: 33
End: 2023-04-25

## 2023-04-25 NOTE — ASSESSMENT
[FreeTextEntry1] : Assessment/Plan:\par  32 year old right handed female with new onset progressive neurological symptoms since 2/2023 with evidence of diffuse nodular leptomeningeal enhancement of brain and spinal cord with work up, thus far, negative for infection or malignancy and likely suggestive of an inflammatory/autoimmune process. 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 \par \par Plan:-\par [] Given symptom progression -will schedule for 5 days of IV solumedrol 1g QD x 5 days outpatient, followed by prednisone taper (60 mg QD, taper down by 10 weekly every 2 weeks)\par [] CT C/A/P w/w/o contrast to rule out granulomatous disease/lymphadenopathy\par [] MRI brain and orbits w/w/o contrast\par [] Vitamin D 1,25 level\par [] Serum Interleukin 2 receptor \par [] ESR, CRP, Rheum serologies, ACE\par [] Neuroophthalmology referral for intermittent visual disturbances\par [] PT @ [facility] for balance training\par \par Return to clinic 6-8 weeks\par \par The above plan was discussed with FELIPA WRIGHT in great detail.  FELIPA WRIGHT verbalized understanding and agrees with plan as detailed above. Patient was provided education and counselling on current diagnosis/symptoms. She was advised to call our clinic at [phone] for any new or worsening symptoms, or with any questions or concerns. In case of acute onset of neurological symptoms or worsening presentation, patient was advised to present to nearest emergency room for further evaluation. FELIPA WRIGHT expressed understanding and all her questions/concerns were addressed.\par \par Irma Krishnan M.D\par

## 2023-04-25 NOTE — DATA REVIEWED
[de-identified] : Imaging at [facility] - -\par CTA H/N and CTV head 2/18/2023- no vascular stenosis, dissection or aneurysm. No dural venous sinus thrombosis. \par MRI brain and C/T spine w/w/o contrast 2/20/2023- Diffuse leptomeningeal disease of brain and spinal cord, scattered nodular enhancement pattern in brain and cervicomedullary junction, involvement of optic chiasm, b/l prox ON, satinder/medulla/cerebellum with b/l cranial nerves involved. A few punctuate subcortical WM lesions.\par . \par \par

## 2023-04-25 NOTE — HISTORY OF PRESENT ILLNESS
[FreeTextEntry1] : HPI- 32 year old RH female, previously healthy, developed new onset intermittent bifrontal HA, dizziness/vertigo, gait imbalance and diplopia symptoms beginning 2/2022. She was seen by Dr. Estephania James (neuro) 2/13 for her symptoms and MRI’s were recommended. However, given new onset b/l LE weakness/numbness/tingling, she presented to [facility] on 2/18, where she was admitted and underwent work up- CT head, CTA/CTV head, MRI of neuroaxis and spinal tap, with work up revealing diffuse leptomeningeal enhancement of brain and spinal cord, and spinal tap showing 34 TNC (85% lymph), glucose 16 (low), protein 207, MBP 13.7, matched and unique OCB (5), MOG neg, enceph panel neg, Culture/gram stain/AFB/PCR neg, ACE 7.1 (normal 0-2.5), flow cytometry with increased small lymph. Serum studies ( Quant TB, KAILEY, Syphilis, ACE negative), ESR 16. She was seen by ID and Neuro. Recommended outpatient follow up. No steroids given in hospital. She left for [place] x 10 days (vacation), 1 week after hospital discharge.  \par \par Imaging at [facility] - -\par CTA H/N and CTV head 2/18/2023- no vascular stenosis, dissection or aneurysm. No dural venous sinus thrombosis. \par MRI brain and C/T spine w/w/o contrast 2/20/2023- Diffuse leptomeningeal disease of brain and spinal cord, scattered nodular enhancement pattern in brain and cervicomedullary junction, involvement of optic chiasm, b/l prox ON, satinder/medulla/cerebellum with b/l cranial nerves involved. A few punctuate subcortical WM lesions.\par \par Interim Hx- -  She continues to be symptomatic. HA and diplopia resolved. Experiences intermittent "dimming" of vision peripherally b/l x 1 week. Sees ophthalmologist (Dr. 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  No pain with eye movements. No vertigo, but has imbalance (not better, more frequent). Intermittent tingling over arms and legs- more frequent. Weakness in legs. \par \par [place] is from [place]. Works for [employer] and [employer] in [place].

## 2023-04-25 NOTE — PHYSICAL EXAM
[FreeTextEntry1] : PHYSICAL EXAM\par Constitutional: Alert, no acute distress \par Psychiatric: appropriate affect and mood\par Pulmonary: No respiratory distress, stable on room air\par \par NEUROLOGICAL EXAM\par Mental status: The patient is alert, attentive and conversational memory intact.\par Speech/language: No dysarthria\par Cranial nerves:\par CN II:  Pupils large (9 mm), briskly reactive to light OD and mild reaction with RAPD OS.  VA 20/40 OU.\par CN III, IV, VI: B/l FABIO, no ptosis\par CN V: Facial sensation is intact to pinprick in all 3 divisions bilaterally.\par CN VII: Face is symmetric with normal eye closure and smile.\par CN VII: Hearing is normal to rubbing fingers\par CN IX, X: Palate elevates symmetrically. Phonation is normal.\par CN XI: Head turning and shoulder shrug are intact\par CN XII: Tongue is midline with normal movements and no atrophy.\par Motor: Strength is full bilaterally. 5/5 muscle power in bilateral UE and LE.\par Reflexes: Diffusely brisk reflexes UE and LE, no holguin, R patellar more brisk than R. B/l 2-3 beat clonus. \par Sensory: Sensations to LT/PP/VIB intact UE and LE\par Coordination: There is no dysmetria on finger-to-nose, mild dysmetria on heel to shin b/l. \par Gait/Stance: Broad based and mildly ataxic. Imbalance on tandem gait. Romberg negative. \par \par \par \par \par

## 2023-04-27 ENCOUNTER — RESULT REVIEW (OUTPATIENT)
Age: 33
End: 2023-04-27

## 2023-04-28 ENCOUNTER — APPOINTMENT (OUTPATIENT)
Dept: NEUROLOGY | Facility: CLINIC | Age: 33
End: 2023-04-28
Payer: COMMERCIAL

## 2023-04-28 PROCEDURE — 96365 THER/PROPH/DIAG IV INF INIT: CPT

## 2023-05-01 ENCOUNTER — APPOINTMENT (OUTPATIENT)
Dept: NEUROLOGY | Facility: CLINIC | Age: 33
End: 2023-05-01
Payer: COMMERCIAL

## 2023-05-01 PROCEDURE — 96365 THER/PROPH/DIAG IV INF INIT: CPT

## 2023-05-02 ENCOUNTER — APPOINTMENT (OUTPATIENT)
Dept: NEUROLOGY | Facility: CLINIC | Age: 33
End: 2023-05-02
Payer: COMMERCIAL

## 2023-05-02 PROCEDURE — 96365 THER/PROPH/DIAG IV INF INIT: CPT

## 2023-05-03 ENCOUNTER — APPOINTMENT (OUTPATIENT)
Dept: NEUROLOGY | Facility: CLINIC | Age: 33
End: 2023-05-03
Payer: COMMERCIAL

## 2023-05-03 PROCEDURE — 96365 THER/PROPH/DIAG IV INF INIT: CPT

## 2023-05-04 ENCOUNTER — NON-APPOINTMENT (OUTPATIENT)
Age: 33
End: 2023-05-04

## 2023-05-04 ENCOUNTER — APPOINTMENT (OUTPATIENT)
Dept: NEUROLOGY | Facility: CLINIC | Age: 33
End: 2023-05-04
Payer: COMMERCIAL

## 2023-05-04 PROCEDURE — 96365 THER/PROPH/DIAG IV INF INIT: CPT

## 2023-05-11 ENCOUNTER — OUTPATIENT (OUTPATIENT)
Dept: OUTPATIENT SERVICES | Facility: HOSPITAL | Age: 33
LOS: 1 days | End: 2023-05-11
Payer: COMMERCIAL

## 2023-05-11 ENCOUNTER — APPOINTMENT (OUTPATIENT)
Dept: MRI IMAGING | Facility: CLINIC | Age: 33
End: 2023-05-11
Payer: COMMERCIAL

## 2023-05-11 ENCOUNTER — APPOINTMENT (OUTPATIENT)
Dept: CT IMAGING | Facility: CLINIC | Age: 33
End: 2023-05-11
Payer: COMMERCIAL

## 2023-05-11 DIAGNOSIS — R90.89 OTHER ABNORMAL FINDINGS ON DIAGNOSTIC IMAGING OF CENTRAL NERVOUS SYSTEM: ICD-10-CM

## 2023-05-11 PROCEDURE — 70543 MRI ORBT/FAC/NCK W/O &W/DYE: CPT | Mod: 26

## 2023-05-11 PROCEDURE — 74177 CT ABD & PELVIS W/CONTRAST: CPT | Mod: 26

## 2023-05-11 PROCEDURE — 71260 CT THORAX DX C+: CPT

## 2023-05-11 PROCEDURE — 71260 CT THORAX DX C+: CPT | Mod: 26

## 2023-05-11 PROCEDURE — 70553 MRI BRAIN STEM W/O & W/DYE: CPT | Mod: 26

## 2023-05-11 PROCEDURE — A9585: CPT

## 2023-05-11 PROCEDURE — 74177 CT ABD & PELVIS W/CONTRAST: CPT

## 2023-05-11 PROCEDURE — 70553 MRI BRAIN STEM W/O & W/DYE: CPT

## 2023-05-11 PROCEDURE — 70543 MRI ORBT/FAC/NCK W/O &W/DYE: CPT

## 2023-05-12 LAB
24R-OH-CALCIDIOL SERPL-MCNC: 43.9 PG/ML
ACE BLD-CCNC: 21 U/L
ANA SER IF-ACNC: NEGATIVE
CRP SERPL-MCNC: <3 MG/L
ENA RNP AB SER IA-ACNC: 0.4 AL
ENA SM AB SER IA-ACNC: <0.2 AL
ENA SS-A AB SER IA-ACNC: <0.2 AL
ENA SS-B AB SER IA-ACNC: <0.2 AL
ERYTHROCYTE [SEDIMENTATION RATE] IN BLOOD BY WESTERGREN METHOD: 13 MM/HR
RHEUMATOID FACT SER QL: <10 IU/ML

## 2023-05-13 LAB — DSDNA AB SER-ACNC: <12 IU/ML

## 2023-05-15 LAB — IL2 SERPL-MCNC: 431.6 PG/ML

## 2023-05-25 ENCOUNTER — TRANSCRIPTION ENCOUNTER (OUTPATIENT)
Age: 33
End: 2023-05-25

## 2023-06-05 ENCOUNTER — APPOINTMENT (OUTPATIENT)
Dept: INTERNAL MEDICINE | Facility: CLINIC | Age: 33
End: 2023-06-05
Payer: COMMERCIAL

## 2023-06-05 ENCOUNTER — NON-APPOINTMENT (OUTPATIENT)
Age: 33
End: 2023-06-05

## 2023-06-05 VITALS
HEART RATE: 112 BPM | RESPIRATION RATE: 14 BRPM | SYSTOLIC BLOOD PRESSURE: 116 MMHG | TEMPERATURE: 97.4 F | WEIGHT: 163 LBS | DIASTOLIC BLOOD PRESSURE: 79 MMHG | BODY MASS INDEX: 30.78 KG/M2 | HEIGHT: 61 IN | OXYGEN SATURATION: 98 %

## 2023-06-05 DIAGNOSIS — H81.10 BENIGN PAROXYSMAL VERTIGO, UNSPECIFIED EAR: ICD-10-CM

## 2023-06-05 DIAGNOSIS — D72.829 ELEVATED WHITE BLOOD CELL COUNT, UNSPECIFIED: ICD-10-CM

## 2023-06-05 PROCEDURE — 99214 OFFICE O/P EST MOD 30 MIN: CPT | Mod: 25

## 2023-06-05 PROCEDURE — 93000 ELECTROCARDIOGRAM COMPLETE: CPT

## 2023-06-05 NOTE — HISTORY OF PRESENT ILLNESS
[de-identified] : PT COMES FOR F/U WITH CONCERN ABOUT DOUBLE VISION AND VISUAL DISTURBANCE X2 LAST 3 WEEKS \par WILL SEE NEUROOPHTHALMOLOGY NEXT MONTH \par WILL SEE NEUROLOGY 8/23\par WALKS WITH CANE SINCE SHE IS AFRAID OF POOR BALANCE \par SINCE SHE STARTED SYSTEMIC STEROIDS SYMPTOMS HAD IMPROVE BUT HAS GOOD AND BAD DAYS \par JOSE MANUEL ANKLE AND KNEE PAIN SINCE ON STEROIDS \par BACK TO WORK ? INTERMITTENT SLURRED SPEECH

## 2023-06-05 NOTE — ASSESSMENT
[FreeTextEntry1] : 32 Y OLD FEM WITH LEPTOMENINGEAL DISEASE= F/U NEUROLOGY \par THIRSTY = LABS \par EKG AND ECHO \par RTO 3 M

## 2023-06-05 NOTE — PHYSICAL EXAM
[No Acute Distress] : no acute distress [Normal] : soft, non-tender, non-distended, no masses palpated, no HSM and normal bowel sounds [Rounded] : rounded [No CVA Tenderness] : no CVA  tenderness [No Joint Swelling] : no joint swelling [No Rash] : no rash [Coordination Grossly Intact] : coordination grossly intact

## 2023-06-05 NOTE — REVIEW OF SYSTEMS
[Fatigue] : fatigue [Vision Problems] : vision problems [Joint Pain] : joint pain [Muscle Pain] : muscle pain [Negative] : Heme/Lymph [FreeTextEntry4] : DRY MOUTH

## 2023-06-06 LAB
25(OH)D3 SERPL-MCNC: 18.2 NG/ML
ALBUMIN SERPL ELPH-MCNC: 4.6 G/DL
ALP BLD-CCNC: 70 U/L
ALT SERPL-CCNC: 46 U/L
ANION GAP SERPL CALC-SCNC: 15 MMOL/L
APPEARANCE: CLEAR
AST SERPL-CCNC: 18 U/L
BILIRUB SERPL-MCNC: 0.5 MG/DL
BILIRUBIN URINE: NEGATIVE
BLOOD URINE: NEGATIVE
BUN SERPL-MCNC: 7 MG/DL
CALCIUM SERPL-MCNC: 9.8 MG/DL
CHLORIDE SERPL-SCNC: 103 MMOL/L
CHOLEST SERPL-MCNC: 252 MG/DL
CO2 SERPL-SCNC: 25 MMOL/L
COLOR: YELLOW
CREAT SERPL-MCNC: 0.67 MG/DL
EGFR: 121 ML/MIN/1.73M2
GLUCOSE QUALITATIVE U: NEGATIVE MG/DL
GLUCOSE SERPL-MCNC: 93 MG/DL
HDLC SERPL-MCNC: 66 MG/DL
KETONES URINE: NEGATIVE MG/DL
LDLC SERPL CALC-MCNC: 162 MG/DL
LEUKOCYTE ESTERASE URINE: NEGATIVE
NITRITE URINE: NEGATIVE
NONHDLC SERPL-MCNC: 186 MG/DL
PH URINE: 7
POTASSIUM SERPL-SCNC: 5.1 MMOL/L
PROT SERPL-MCNC: 7.1 G/DL
PROTEIN URINE: NEGATIVE MG/DL
SODIUM SERPL-SCNC: 143 MMOL/L
SPECIFIC GRAVITY URINE: 1
TRIGL SERPL-MCNC: 118 MG/DL
TSH SERPL-ACNC: 0.41 UIU/ML
UROBILINOGEN URINE: 0.2 MG/DL

## 2023-06-08 ENCOUNTER — APPOINTMENT (OUTPATIENT)
Dept: HEART AND VASCULAR | Facility: CLINIC | Age: 33
End: 2023-06-08
Payer: COMMERCIAL

## 2023-06-08 PROBLEM — D72.829 LEUKOCYTOSIS, UNSPECIFIED TYPE: Status: ACTIVE | Noted: 2023-06-08

## 2023-06-08 PROCEDURE — 93306 TTE W/DOPPLER COMPLETE: CPT

## 2023-06-14 ENCOUNTER — NON-APPOINTMENT (OUTPATIENT)
Age: 33
End: 2023-06-14

## 2023-07-03 ENCOUNTER — OUTPATIENT (OUTPATIENT)
Dept: OUTPATIENT SERVICES | Facility: HOSPITAL | Age: 33
LOS: 1 days | End: 2023-07-03
Payer: COMMERCIAL

## 2023-07-03 ENCOUNTER — APPOINTMENT (OUTPATIENT)
Dept: NUCLEAR MEDICINE | Facility: IMAGING CENTER | Age: 33
End: 2023-07-03
Payer: COMMERCIAL

## 2023-07-03 DIAGNOSIS — G96.198 OTHER DISORDERS OF MENINGES, NOT ELSEWHERE CLASSIFIED: ICD-10-CM

## 2023-07-03 PROCEDURE — A9552: CPT

## 2023-07-03 PROCEDURE — 78816 PET IMAGE W/CT FULL BODY: CPT | Mod: 26

## 2023-07-03 PROCEDURE — 78816 PET IMAGE W/CT FULL BODY: CPT

## 2023-07-10 ENCOUNTER — APPOINTMENT (OUTPATIENT)
Dept: OPHTHALMOLOGY | Facility: CLINIC | Age: 33
End: 2023-07-10
Payer: COMMERCIAL

## 2023-07-10 ENCOUNTER — NON-APPOINTMENT (OUTPATIENT)
Age: 33
End: 2023-07-10

## 2023-07-10 PROCEDURE — 92083 EXTENDED VISUAL FIELD XM: CPT

## 2023-07-10 PROCEDURE — 92133 CPTRZD OPH DX IMG PST SGM ON: CPT

## 2023-07-10 PROCEDURE — 99204 OFFICE O/P NEW MOD 45 MIN: CPT

## 2023-07-24 ENCOUNTER — APPOINTMENT (OUTPATIENT)
Dept: NEUROLOGY | Facility: CLINIC | Age: 33
End: 2023-07-24
Payer: COMMERCIAL

## 2023-07-24 VITALS
SYSTOLIC BLOOD PRESSURE: 105 MMHG | BODY MASS INDEX: 30.78 KG/M2 | DIASTOLIC BLOOD PRESSURE: 71 MMHG | WEIGHT: 163 LBS | HEART RATE: 112 BPM | HEIGHT: 61 IN

## 2023-07-24 DIAGNOSIS — H53.30 UNSPECIFIED DISORDER OF BINOCULAR VISION: ICD-10-CM

## 2023-07-24 PROCEDURE — 99215 OFFICE O/P EST HI 40 MIN: CPT

## 2023-07-24 RX ORDER — CHLORHEXIDINE GLUCONATE, 0.12% ORAL RINSE 1.2 MG/ML
0.12 SOLUTION DENTAL
Qty: 473 | Refills: 0 | Status: COMPLETED | COMMUNITY
Start: 2023-03-25

## 2023-07-24 RX ORDER — LATANOPROST/PF 0.005 %
0.01 DROPS OPHTHALMIC (EYE)
Qty: 2 | Refills: 0 | Status: COMPLETED | COMMUNITY
Start: 2023-03-25

## 2023-07-24 RX ORDER — METHYLPREDNISOLONE SODIUM SUCCINATE 500 MG/8ML
500 INJECTION INTRAMUSCULAR; INTRAVENOUS
Qty: 2 | Refills: 4 | Status: DISCONTINUED | COMMUNITY
Start: 2023-04-28 | End: 2023-07-24

## 2023-07-24 RX ORDER — FAMOTIDINE 40 MG/1
40 TABLET, FILM COATED ORAL
Qty: 30 | Refills: 0 | Status: COMPLETED | COMMUNITY
Start: 2023-03-27

## 2023-07-24 NOTE — ASSESSMENT
[FreeTextEntry1] : Assessment/Plan:\par Diffuse CNS nodular leptomeningeal disease (dx'd 2/2023)- concern for possible neurosarcoidosis, lower suspicion for infection or malignancy. \par \par Clinically stable, no brand new symptoms. She has good and bad days. Neurological exam unchanged (visual acuity has improved). Her ongoing generalized/muscle fatigue could be 2/2 steroid use vs lack of physical activity. \par \par Plan:-\par [] Continue prednisone 20 mg QD for now\par [] Continue pantoprazole 40 mg QD\par [] Cervical lymph node biopsy scheduled 8/2.\par [] Repeat MRI imaging of neuroaxis\par [] Will check basic labs and CK level\par [] Will refer to PT for strengthening and balance training\par \par \par Return to clinic 6 weeks\par \par The above plan was discussed with FELIPA WRIGHT in great detail.  FELIPA WRIGHT verbalized understanding and agrees with plan as detailed above. Patient was provided education and counselling on current diagnosis/symptoms. She was advised to call our clinic at [phone] for any new or worsening symptoms, or with any questions or concerns. In case of acute onset of neurological symptoms or worsening presentation, patient was advised to present to nearest emergency room for further evaluation. FELIPA WRIGHT expressed understanding and all her questions/concerns were addressed.\par \par Irma Krishnan M.D\par

## 2023-07-24 NOTE — HISTORY OF PRESENT ILLNESS
[FreeTextEntry1] : INTERIM HX 07/24/2023: After our last visit, pt completed several studies (detailed below), also completed 5 days IV solumedrol 4/28, 5/1-5/4, now on prednisone taper (currently at 20 mg QD)\par Labs 5/11/23- Interleukin 2 recep neg, ACE neg, CASSI nl, RF nl, ESR and CRP nl. SSA/B nl. Vitamin D1.25 nl, KAILEY nl, DsDNA nl.\par CT chest/abd/pelvis 5/11/23- neg for malignancy/lymphadenopathy\par MRI brain and orbits w/w/o 5/11/2023- Stable diffuse leptomeningeal enh. \par FDG PET scan 7/3/2023- Few FDG-avid right cervical lymph nodes (needle biopsy recommended)\par \par Seen by neuro oph (Dr Chawla) 7/10- VA 20/40 OD and 20/30 OS, trace APD on the left. superior arcuate VF defect OS > OS.\par \par Never did PT. On prednisone 20 mg QD for the past 2 weeks. Having more bad days then good days- muscle fatigue at end of day, constant tingling throughout the body- maybe due to her starting work. Uses a cane. No longer has diplopia. Occasional vertigo. No brand new symptoms. "Good days are very good".\par \par ---------------------------------------------------------------------------------------------------------------------------------------\par HPI (4/20/2023)- 32 year old RH female, previously healthy, developed new onset intermittent bifrontal HA, dizziness/vertigo, gait imbalance and diplopia symptoms beginning 2/2022. She was seen by Dr. Estephania James (neuro) 2/13 for her symptoms and MRI’s were recommended. However, given new onset b/l LE weakness/numbness/tingling, she presented to [facility] on 2/18, where she was admitted and underwent work up- CT head, CTA/CTV head, MRI of neuroaxis and spinal tap, with work up revealing diffuse leptomeningeal enhancement of brain and spinal cord, and spinal tap showing 34 TNC (85% lymph), glucose 16 (low), protein 207, MBP 13.7, matched and unique OCB (5), MOG neg, enceph panel neg, Culture/gram stain/AFB/PCR neg, ACE 7.1 (normal 0-2.5), flow cytometry with increased small lymph. Serum studies ( Quant TB, KAILEY, Syphilis, ACE negative), ESR 16. She was seen by ID and Neuro. Recommended outpatient follow up. No steroids given in hospital. She left for [place] x 10 days (vacation), 1 week after hospital discharge.  \par \par Imaging at [facility] - -\par CTA H/N and CTV head 2/18/2023- no vascular stenosis, dissection or aneurysm. No dural venous sinus thrombosis. \par MRI brain and C/T spine w/w/o contrast 2/20/2023- Diffuse leptomeningeal disease of brain and spinal cord, scattered nodular enhancement pattern in brain and cervicomedullary junction, involvement of optic chiasm, b/l prox ON, satinder/medulla/cerebellum with b/l cranial nerves involved. A few punctuate subcortical WM lesions.\par \par Interim Hx- -  She continues to be symptomatic. HA and diplopia resolved. Experiences intermittent "dimming" of vision peripherally b/l x 1 week. Sees ophthalmologist (Dr. 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  No pain with eye movements. No vertigo, but has imbalance (not better, more frequent). Intermittent tingling over arms and legs- more frequent. Weakness in legs. \par \par She is from  Works for [employer] in [place].

## 2023-07-24 NOTE — DATA REVIEWED
[de-identified] : \par CT chest/abd/pelvis 5/11/23- neg for malignancy/lymphadenopathy\par MRI brain and orbits w/w/o 5/11/2023- Stable diffuse leptomeningeal enh. \par FDG PET scan 7/3/2023- Few FDG-avid right cervical lymph nodes (needle biopsy recommended)\par \par \par Imaging at [facility] - -\par CTA H/N and CTV head 2/18/2023- no vascular stenosis, dissection or aneurysm. No dural venous sinus thrombosis. \par MRI brain and C/T spine w/w/o contrast 2/20/2023- Diffuse leptomeningeal disease of brain and spinal cord, scattered nodular enhancement pattern in brain and cervicomedullary junction, involvement of optic chiasm, b/l prox ON, satinder/medulla/cerebellum with b/l cranial nerves involved. A few punctuate subcortical WM lesions.\par . \par \par  [de-identified] : Labs 5/11/23- Interleukin 2 recep neg, ACE neg, CASSI nl, RF nl, ESR and CRP nl. SSA/B nl. Vitamin D1.25 nl, KAILEY nl, DsDNA nl.

## 2023-07-24 NOTE — PHYSICAL EXAM
[FreeTextEntry1] : PHYSICAL EXAM\par Constitutional: Alert, no acute distress \par Psychiatric: appropriate affect and mood\par Pulmonary: No respiratory distress, stable on room air\par \par NEUROLOGICAL EXAM\par Mental status: The patient is alert, attentive and conversational memory intact.\par Speech/language: No dysarthria\par Cranial nerves:\par CN II: Pupils large (9 mm OD and 11 mm OS), briskly reactive to light OD and mild reaction with RAPD OS. VA 20/20 OU.\par CN III, IV, VI: B/l end gaze nystagmus, no ptosis\par CN V: Facial sensation is intact to pinprick in all 3 divisions bilaterally.\par CN VII: Face is symmetric with normal eye closure and smile.\par CN VII: Hearing is normal to rubbing fingers\par CN IX, X: Palate elevates symmetrically. Phonation is normal.\par CN XI: Head turning and shoulder shrug are intact\par CN XII: Tongue is midline with normal movements and no atrophy.\par Motor: Strength is full bilaterally. 5/5 muscle power in bilateral UE and LE.\par Reflexes: Biceps 3+, Patellar 2+ on right and 1+ on left, ankles 3+. \par                 2 beat clonus on left. Plantars flexors b/l.\par                 No holguin. \par Sensory: Sensations to LT intact UE and LE\par Coordination: There is no dysmetria on finger-to-nose, mild dysmetria on heel to shin b/l. \par Gait/Stance: Broad based and mildly ataxic. RLE spastic Imbalance on tandem gait. Romberg negative. \par

## 2023-08-02 ENCOUNTER — OUTPATIENT (OUTPATIENT)
Dept: OUTPATIENT SERVICES | Facility: HOSPITAL | Age: 33
LOS: 1 days | End: 2023-08-02
Payer: COMMERCIAL

## 2023-08-02 ENCOUNTER — RESULT REVIEW (OUTPATIENT)
Age: 33
End: 2023-08-02

## 2023-08-02 ENCOUNTER — APPOINTMENT (OUTPATIENT)
Dept: ULTRASOUND IMAGING | Facility: IMAGING CENTER | Age: 33
End: 2023-08-02
Payer: COMMERCIAL

## 2023-08-02 DIAGNOSIS — G96.198 OTHER DISORDERS OF MENINGES, NOT ELSEWHERE CLASSIFIED: ICD-10-CM

## 2023-08-02 PROCEDURE — 76942 ECHO GUIDE FOR BIOPSY: CPT

## 2023-08-02 PROCEDURE — 38505 NEEDLE BIOPSY LYMPH NODES: CPT | Mod: RT

## 2023-08-02 PROCEDURE — 38505 NEEDLE BIOPSY LYMPH NODES: CPT

## 2023-08-02 PROCEDURE — 88173 CYTOPATH EVAL FNA REPORT: CPT | Mod: 26

## 2023-08-02 PROCEDURE — 88173 CYTOPATH EVAL FNA REPORT: CPT

## 2023-08-02 PROCEDURE — 88312 SPECIAL STAINS GROUP 1: CPT

## 2023-08-02 PROCEDURE — 88312 SPECIAL STAINS GROUP 1: CPT | Mod: 26

## 2023-08-02 PROCEDURE — 76942 ECHO GUIDE FOR BIOPSY: CPT | Mod: 26

## 2023-08-02 PROCEDURE — 88305 TISSUE EXAM BY PATHOLOGIST: CPT | Mod: 26

## 2023-08-02 PROCEDURE — 88172 CYTP DX EVAL FNA 1ST EA SITE: CPT

## 2023-08-02 PROCEDURE — 88305 TISSUE EXAM BY PATHOLOGIST: CPT

## 2023-08-04 LAB — NON-GYNECOLOGICAL CYTOLOGY STUDY: SIGNIFICANT CHANGE UP

## 2023-08-15 ENCOUNTER — APPOINTMENT (OUTPATIENT)
Dept: MRI IMAGING | Facility: CLINIC | Age: 33
End: 2023-08-15
Payer: COMMERCIAL

## 2023-08-15 ENCOUNTER — OUTPATIENT (OUTPATIENT)
Dept: OUTPATIENT SERVICES | Facility: HOSPITAL | Age: 33
LOS: 1 days | End: 2023-08-15
Payer: COMMERCIAL

## 2023-08-15 DIAGNOSIS — G96.198 OTHER DISORDERS OF MENINGES, NOT ELSEWHERE CLASSIFIED: ICD-10-CM

## 2023-08-15 DIAGNOSIS — Z00.8 ENCOUNTER FOR OTHER GENERAL EXAMINATION: ICD-10-CM

## 2023-08-15 PROCEDURE — 72156 MRI NECK SPINE W/O & W/DYE: CPT | Mod: 26

## 2023-08-15 PROCEDURE — 72157 MRI CHEST SPINE W/O & W/DYE: CPT

## 2023-08-15 PROCEDURE — 72157 MRI CHEST SPINE W/O & W/DYE: CPT | Mod: 26

## 2023-08-15 PROCEDURE — 70553 MRI BRAIN STEM W/O & W/DYE: CPT

## 2023-08-15 PROCEDURE — 70553 MRI BRAIN STEM W/O & W/DYE: CPT | Mod: 26

## 2023-08-15 PROCEDURE — 72156 MRI NECK SPINE W/O & W/DYE: CPT

## 2023-08-15 PROCEDURE — A9585: CPT

## 2023-08-25 ENCOUNTER — APPOINTMENT (OUTPATIENT)
Dept: NEUROLOGY | Facility: CLINIC | Age: 33
End: 2023-08-25
Payer: COMMERCIAL

## 2023-08-25 VITALS
HEIGHT: 61 IN | DIASTOLIC BLOOD PRESSURE: 82 MMHG | BODY MASS INDEX: 30.78 KG/M2 | HEART RATE: 120 BPM | WEIGHT: 163 LBS | SYSTOLIC BLOOD PRESSURE: 118 MMHG

## 2023-08-25 LAB
ALBUMIN SERPL ELPH-MCNC: 4.6 G/DL
ALP BLD-CCNC: 63 U/L
ALT SERPL-CCNC: 23 U/L
ANION GAP SERPL CALC-SCNC: 17 MMOL/L
AST SERPL-CCNC: 14 U/L
BILIRUB SERPL-MCNC: 0.5 MG/DL
BUN SERPL-MCNC: 8 MG/DL
CALCIUM SERPL-MCNC: 9.6 MG/DL
CHLORIDE SERPL-SCNC: 101 MMOL/L
CK SERPL-CCNC: 40 U/L
CO2 SERPL-SCNC: 23 MMOL/L
CREAT SERPL-MCNC: 0.71 MG/DL
EGFR: 117 ML/MIN/1.73M2
GLUCOSE SERPL-MCNC: 64 MG/DL
POTASSIUM SERPL-SCNC: 4.2 MMOL/L
PROT SERPL-MCNC: 6.9 G/DL
SODIUM SERPL-SCNC: 141 MMOL/L

## 2023-08-25 PROCEDURE — 99215 OFFICE O/P EST HI 40 MIN: CPT

## 2023-08-25 NOTE — PHYSICAL EXAM
[FreeTextEntry1] : PHYSICAL EXAM Constitutional: Alert, no acute distress Psychiatric: appropriate affect and mood Pulmonary: No respiratory distress, stable on room air  NEUROLOGICAL EXAM Mental status: The patient is alert, attentive and conversational memory intact. Speech/language: No dysarthria Cranial nerves: CN II: Pupils large (9 mm OD and 11 mm OS), briskly reactive to light OD and mild reaction with RAPD OS. VA 20/20 OU. CN III, IV, VI: B/l end gaze nystagmus, no ptosis CN V: Facial sensation is intact to pinprick in all 3 divisions bilaterally. CN VII: Face is symmetric with normal eye closure and smile. CN VII: Hearing is normal to rubbing fingers CN IX, X: Palate elevates symmetrically. Phonation is normal. CN XI: Head turning and shoulder shrug are intact CN XII: Tongue is midline with normal movements and no atrophy. Motor: Strength is full bilaterally. 5/5 muscle power in bilateral UE and LE. Reflexes: Biceps 3+, Patellar 2+ on right and 1+ on left, ankles 3+.  2 beat clonus on left. Plantars flexors b/l.  No holguin. Sensory: Sensations to LT intact UE and LE Coordination: There is no dysmetria on finger-to-nose, mild dysmetria on heel to shin b/l. Gait/Stance: Broad based and mildly ataxic. RLE spastic Imbalance on tandem gait. Romberg negative. Can walk without cane.

## 2023-08-25 NOTE — DATA REVIEWED
[de-identified] : MRI brain, cervical and thoracic spine w/w/o 8/15/2023- C and T spine w/ similar extensive cord surface leptomeningeal enhancement compared w/ 2/2023. 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   CT chest/abd/pelvis 5/11/23- neg for malignancy/lymphadenopathy MRI brain and orbits w/w/o 5/11/2023- Stable diffuse leptomeningeal enh.  FDG PET scan 7/3/2023- Few FDG-avid right cervical lymph nodes (needle biopsy recommended)   Imaging at [facility] - - CTA H/N and CTV head 2/18/2023- no vascular stenosis, dissection or aneurysm. No dural venous sinus thrombosis.  MRI brain and C/T spine w/w/o contrast 2/20/2023- Diffuse leptomeningeal disease of brain and spinal cord, scattered nodular enhancement pattern in brain and cervicomedullary junction, involvement of optic chiasm, b/l prox ON, satinder/medulla/cerebellum with b/l cranial nerves involved. A few punctuate subcortical WM lesions. .    [de-identified] : Labs 5/11/23- Interleukin 2 recep neg, ACE neg, CASSI nl, RF nl, ESR and CRP nl. SSA/B nl. Vitamin D1.25 nl, KAILEY nl, DsDNA nl.

## 2023-08-25 NOTE — HISTORY OF PRESENT ILLNESS
[FreeTextEntry1] : INTERIM HX 08/25/2023: Here to review results of cervical LN biopsy results and MRI results. cervical LN biopsy- c/w necrotizing granulomas, negative for malignant cells, no organisms on special stains (AFB, GMS). MRI brain, cervical and thoracic spine w/w/o 8/15/2023- C and T spine w/ similar extensive cord surface leptomeningeal enhancement compared w/ 2/2023. 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   Pt remains on prednisone 20 mg QD. Reports more good days lately. Did have an episode 2 weeks ago, she was out in the heat and came back in and felt very dizzy and body felt weak, could not keep herself up. No LOC or seizure likely activity.  Overall, she reports headaches have resolved, diplopia resolved, vision loss improved. Dizziness is on and off. GAit is still not normal, but balance a little better. In PT and OT. Tingling in extremities is constant. Joint pains is stable and intermittent.  She believes steroids has helped with clinical symptoms some, and did note a little worsening at times of tapering down dosage.  INTERIM HX 07/24/2023: After our last visit, pt completed several studies (detailed below), also completed 5 days IV solumedrol 4/28, 5/1-5/4, now on prednisone taper (currently at 20 mg QD) Labs 5/11/23- Interleukin 2 recep neg, ACE neg, CASSI nl, RF nl, ESR and CRP nl. SSA/B nl. Vitamin D1.25 nl, KAILEY nl, DsDNA nl. CT chest/abd/pelvis 5/11/23- neg for malignancy/lymphadenopathy MRI brain and orbits w/w/o 5/11/2023- Stable diffuse leptomeningeal enh.  FDG PET scan 7/3/2023- Few FDG-avid right cervical lymph nodes (needle biopsy recommended)  Seen by neuro oph (Dr Chawla) 7/10- VA 20/40 OD and 20/30 OS, trace APD on the left. superior arcuate VF defect OS > OS.  Never did PT. On prednisone 20 mg QD for the past 2 weeks. Having more bad days then good days- muscle fatigue at end of day, constant tingling throughout the body- maybe due to her starting work. Uses a cane. No longer has diplopia. Occasional vertigo. No brand new symptoms. "Good days are very good".  --------------------------------------------------------------------------------------------------------------------------------------- HPI (4/20/2023)- 32 year old RH female, previously healthy, developed new onset intermittent bifrontal HA, dizziness/vertigo, gait imbalance and diplopia symptoms beginning 2/2023. She was seen by Dr. Estephania James (neuro) 2/13 for her symptoms and MRI's were recommended. However, given new onset b/l LE weakness/numbness/tingling, she presented to [facility] on 2/18, where she was admitted and underwent work up- CT head, CTA/CTV head, MRI of neuroaxis and spinal tap, with work up revealing diffuse leptomeningeal enhancement of brain and spinal cord, and spinal tap showing 34 TNC (85% lymph), glucose 16 (low), protein 207, MBP 13.7, matched and unique OCB (5), MOG neg, enceph panel neg, Culture/gram stain/AFB/PCR neg, ACE 7.1 (normal 0-2.5), flow cytometry with increased small lymph. Serum studies ( Quant TB, KAILEY, Syphilis, ACE negative), ESR 16. She was seen by ID and Neuro. Recommended outpatient follow up. No steroids given in hospital. She left for DR x 10 days (vacation), 1 week after hospital discharge.    Imaging at [facility] - - CTA H/N and CTV head 2/18/2023- no vascular stenosis, dissection or aneurysm. No dural venous sinus thrombosis.  MRI brain and C/T spine w/w/o contrast 2/20/2023- Diffuse leptomeningeal disease of brain and spinal cord, scattered nodular enhancement pattern in brain and cervicomedullary junction, involvement of optic chiasm, b/l prox ON, satinder/medulla/cerebellum with b/l cranial nerves involved. A few punctuate subcortical WM lesions.  Interim Hx- -  She continues to be symptomatic. HA and diplopia resolved. Experiences intermittent "dimming" of vision peripherally b/l x 1 week. Sees ophthalmologist (Dr. 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  No pain with eye movements. No vertigo, but has imbalance (not better, more frequent). Intermittent tingling over arms and legs- more frequent. Weakness in legs.   She is from . Works for [employer] in [place].

## 2023-08-25 NOTE — ASSESSMENT
[FreeTextEntry1] : Assessment/Plan: Diffuse CNS nodular leptomeningeal disease (dx'd 2/2023)-   Her working diagnosis has been neurosarcoidosis; given nodular leptomeningeal lesions, modest clinical response to steroids, elevated ACE in CSF, CSF lymphocytic pleocytosis/elevated protein/low glucose and w/ negative basic infectious work up.   However, her cervical LN biopsy is c/w with necrotizing granuloma (neg for malignant cells, neg AFB and fungal stains); sarcoid typically causes non necrotizing granulomas and only VERY rarely have cases of neurosarcoid with necrotizing granulomas (NS-NSG) been reported. Also, a repeat MRI imaging showed no improvement in nodular leptomeningeal disease, despite a course of IV dose steroids and oral prednisone taper, which could be atypical for neurosarcoid (unless this is a very aggressive case).   At this time, I would like to pursue further work up for infectious and non-infectious causes of necrotizing granulomas.   Plan:- [] Continue prednisone 20 mg QD for now [] Continue pantoprazole 40 mg QD [] Referral to ID to rule out infectious causes of necrotizing granuloma [] Repeat LP for infectious/autoimmune/malignancy work up [] Repeat serum labs for infectious/autoimmune/inflammatory disorders [] If all inconclusive, consider? brain biopsy. [] Referred PT for strengthening and balance training.  Return to clinic 6 weeks  The above plan was discussed with FELIPA WRIGHT in great detail.  FELIPA WRIGHT verbalized understanding and agrees with plan as detailed above. Patient was provided education and counselling on current diagnosis/symptoms. She was advised to call our clinic at [phone] for any new or worsening symptoms, or with any questions or concerns. In case of acute onset of neurological symptoms or worsening presentation, patient was advised to present to nearest emergency room for further evaluation. FELIPA WRIGHT expressed understanding and all her questions/concerns were addressed.  Irma Krishnan M.D

## 2023-09-06 ENCOUNTER — LABORATORY RESULT (OUTPATIENT)
Age: 33
End: 2023-09-06

## 2023-09-15 ENCOUNTER — OUTPATIENT (OUTPATIENT)
Dept: OUTPATIENT SERVICES | Facility: HOSPITAL | Age: 33
LOS: 1 days | End: 2023-09-15
Payer: COMMERCIAL

## 2023-09-15 ENCOUNTER — RESULT REVIEW (OUTPATIENT)
Age: 33
End: 2023-09-15

## 2023-09-15 ENCOUNTER — APPOINTMENT (OUTPATIENT)
Dept: RADIOLOGY | Facility: HOSPITAL | Age: 33
End: 2023-09-15

## 2023-09-15 DIAGNOSIS — G96.198 OTHER DISORDERS OF MENINGES, NOT ELSEWHERE CLASSIFIED: ICD-10-CM

## 2023-09-15 LAB
APPEARANCE CSF: CLEAR — SIGNIFICANT CHANGE UP
APPEARANCE SPUN FLD: COLORLESS — SIGNIFICANT CHANGE UP
BACTERIAL AG PNL SER: 3 % — SIGNIFICANT CHANGE UP
COLOR CSF: SIGNIFICANT CHANGE UP
GLUCOSE CSF-MCNC: 19 MG/DL — LOW (ref 40–70)
GRAM STN FLD: SIGNIFICANT CHANGE UP
LACTATE CSF-MCNC: 5.7 MMOL/L — HIGH (ref 1.1–2.4)
LDH CSF L TO P-CCNC: 39 U/L — SIGNIFICANT CHANGE UP
LDH FLD-CCNC: 39 U/L — SIGNIFICANT CHANGE UP
LYMPHOCYTES # CSF: 64 % — SIGNIFICANT CHANGE UP (ref 40–80)
MONOS+MACROS NFR CSF: 30 % — SIGNIFICANT CHANGE UP (ref 15–45)
NEUTROPHILS # CSF: 3 % — SIGNIFICANT CHANGE UP (ref 0–6)
NIGHT BLUE STAIN TISS: SIGNIFICANT CHANGE UP
NRBC NFR CSF: 18 /UL — HIGH (ref 0–5)
PROT CSF-MCNC: 117 MG/DL — HIGH (ref 15–45)
RBC # CSF: 0 /UL — SIGNIFICANT CHANGE UP (ref 0–0)
SPECIMEN SOURCE: SIGNIFICANT CHANGE UP
SPECIMEN SOURCE: SIGNIFICANT CHANGE UP
TUBE TYPE: SIGNIFICANT CHANGE UP

## 2023-09-15 PROCEDURE — 82232 ASSAY OF BETA-2 PROTEIN: CPT

## 2023-09-15 PROCEDURE — 82164 ANGIOTENSIN I ENZYME TEST: CPT

## 2023-09-15 PROCEDURE — 86403 PARTICLE AGGLUT ANTBDY SCRN: CPT

## 2023-09-15 PROCEDURE — 86789 WEST NILE VIRUS ANTIBODY: CPT

## 2023-09-15 PROCEDURE — 86617 LYME DISEASE ANTIBODY: CPT

## 2023-09-15 PROCEDURE — 87205 SMEAR GRAM STAIN: CPT

## 2023-09-15 PROCEDURE — 86362 MOG-IGG1 ANTB CBA EACH: CPT

## 2023-09-15 PROCEDURE — 87070 CULTURE OTHR SPECIMN AEROBIC: CPT

## 2023-09-15 PROCEDURE — 88185 FLOWCYTOMETRY/TC ADD-ON: CPT

## 2023-09-15 PROCEDURE — 86592 SYPHILIS TEST NON-TREP QUAL: CPT

## 2023-09-15 PROCEDURE — 83615 LACTATE (LD) (LDH) ENZYME: CPT

## 2023-09-15 PROCEDURE — 82945 GLUCOSE OTHER FLUID: CPT

## 2023-09-15 PROCEDURE — 88184 FLOWCYTOMETRY/ TC 1 MARKER: CPT

## 2023-09-15 PROCEDURE — 88108 CYTOPATH CONCENTRATE TECH: CPT | Mod: 26,59

## 2023-09-15 PROCEDURE — 87102 FUNGUS ISOLATION CULTURE: CPT

## 2023-09-15 PROCEDURE — 62328 DX LMBR SPI PNXR W/FLUOR/CT: CPT

## 2023-09-15 PROCEDURE — 82784 ASSAY IGA/IGD/IGG/IGM EACH: CPT

## 2023-09-15 PROCEDURE — 87449 NOS EACH ORGANISM AG IA: CPT

## 2023-09-15 PROCEDURE — 84157 ASSAY OF PROTEIN OTHER: CPT

## 2023-09-15 PROCEDURE — 82040 ASSAY OF SERUM ALBUMIN: CPT

## 2023-09-15 PROCEDURE — 82042 OTHER SOURCE ALBUMIN QUAN EA: CPT

## 2023-09-15 PROCEDURE — 87483 CNS DNA AMP PROBE TYPE 12-25: CPT

## 2023-09-15 PROCEDURE — 86788 WEST NILE VIRUS AB IGM: CPT

## 2023-09-15 PROCEDURE — 88189 FLOWCYTOMETRY/READ 16 & >: CPT

## 2023-09-15 PROCEDURE — 88108 CYTOPATH CONCENTRATE TECH: CPT

## 2023-09-15 PROCEDURE — 87385 HISTOPLASMA CAPSUL AG IA: CPT

## 2023-09-15 PROCEDURE — 87799 DETECT AGENT NOS DNA QUANT: CPT

## 2023-09-15 PROCEDURE — 83873 ASSAY OF CSF PROTEIN: CPT

## 2023-09-15 PROCEDURE — 87116 MYCOBACTERIA CULTURE: CPT

## 2023-09-15 PROCEDURE — 83916 OLIGOCLONAL BANDS: CPT

## 2023-09-15 PROCEDURE — 83605 ASSAY OF LACTIC ACID: CPT

## 2023-09-15 PROCEDURE — 89051 BODY FLUID CELL COUNT: CPT

## 2023-09-16 LAB
C NEOFORM RRNA SPEC NAA+PROBE-ACNC: SIGNIFICANT CHANGE UP
CMV DNA CSF QL NAA+PROBE: SIGNIFICANT CHANGE UP
CRYPTOC AG CSF-ACNC: NEGATIVE — SIGNIFICANT CHANGE UP
CSF PCR RESULT: SIGNIFICANT CHANGE UP
E COLI K1 DNA CSF QL NAA+NON-PROBE: SIGNIFICANT CHANGE UP
ESCHERICHIA COLI K1: SIGNIFICANT CHANGE UP
EV RNA CSF QL NAA+PROBE: SIGNIFICANT CHANGE UP
GP B STREP DNA SPEC QL NAA+PROBE: SIGNIFICANT CHANGE UP
HAEM INFLU DNA SPEC QL NAA+PROBE: SIGNIFICANT CHANGE UP
HHV6 DNA CSF QL NAA+PROBE: SIGNIFICANT CHANGE UP
HSV1 DNA CSF QL NAA+PROBE: SIGNIFICANT CHANGE UP
HSV2 DNA CSF QL NAA+PROBE: SIGNIFICANT CHANGE UP
L MONOCYTOG DNA SPEC QL NAA+PROBE: SIGNIFICANT CHANGE UP
N MEN DNA SPEC QL NAA+PROBE: SIGNIFICANT CHANGE UP
PARECHOVIRUS A RNA SPEC QL NAA+PROBE: SIGNIFICANT CHANGE UP
S PNEUM DNA SPEC QL NAA+PROBE: SIGNIFICANT CHANGE UP
VZV DNA CSF QL NAA+PROBE: SIGNIFICANT CHANGE UP

## 2023-09-17 LAB — B2 MICROGLOB CSF-MCNC: 5.1 MG/L — HIGH (ref 0–2.4)

## 2023-09-18 LAB
JCPYV DNA # CSF NAA+PROBE: SIGNIFICANT CHANGE UP COPIES/ML
Lab: <60 PG/ML — SIGNIFICANT CHANGE UP
TM INTERPRETATION: SIGNIFICANT CHANGE UP

## 2023-09-19 LAB
NON-GYNECOLOGICAL CYTOLOGY STUDY: SIGNIFICANT CHANGE UP
WNV IGG CSF IA-ACNC: NEGATIVE — SIGNIFICANT CHANGE UP
WNV IGM CSF IA-ACNC: NEGATIVE — SIGNIFICANT CHANGE UP

## 2023-09-20 LAB
ALBUMIN CSF-MCNC: 85.1 MG/DL — HIGH (ref 14–25)
ALBUMIN SERPL ELPH-MCNC: SIGNIFICANT CHANGE UP MG/DL (ref 3500–5200)
H CAPSUL AG CSF IA-MCNC: SIGNIFICANT CHANGE UP
IGG CSF-MCNC: 10.9 MG/DL — HIGH
IGG CSF-MCNC: 10.9 MG/DL — HIGH
IGG FLD-MCNC: SIGNIFICANT CHANGE UP MG/DL (ref 610–1660)
IGG SYNTH RATE SER+CSF CALC-MRATE: SIGNIFICANT CHANGE UP MG/DAY
IGG/ALB CLEAR SER+CSF-RTO: SIGNIFICANT CHANGE UP
IGG/ALB CSF: 0.13 RATIO — SIGNIFICANT CHANGE UP
IGG/ALB SER: SIGNIFICANT CHANGE UP RATIO
INNER EAR 68KD AB FLD QL: 4.6 U/L — HIGH (ref 0–2.5)
MBP CSF-MCNC: 7.4 NG/ML — HIGH (ref 0–2.9)
VDRL CSF-TITR: SIGNIFICANT CHANGE UP

## 2023-09-22 LAB — MOG AB CSF QL CBA IFA: NEGATIVE — SIGNIFICANT CHANGE UP

## 2023-09-25 ENCOUNTER — NON-APPOINTMENT (OUTPATIENT)
Age: 33
End: 2023-09-25

## 2023-09-28 LAB — B BURGDOR AB CSF-ACNC: NEGATIVE — SIGNIFICANT CHANGE UP

## 2023-09-29 LAB
CULTURE RESULTS: SIGNIFICANT CHANGE UP
OLIGOCLONAL BANDS CSF ELPH-IMP: ABNORMAL
SPECIMEN SOURCE: SIGNIFICANT CHANGE UP

## 2023-10-03 ENCOUNTER — APPOINTMENT (OUTPATIENT)
Dept: NEUROSURGERY | Facility: CLINIC | Age: 33
End: 2023-10-03
Payer: COMMERCIAL

## 2023-10-03 ENCOUNTER — NON-APPOINTMENT (OUTPATIENT)
Age: 33
End: 2023-10-03

## 2023-10-03 VITALS
OXYGEN SATURATION: 98 % | BODY MASS INDEX: 30.78 KG/M2 | WEIGHT: 163 LBS | HEIGHT: 61 IN | SYSTOLIC BLOOD PRESSURE: 100 MMHG | HEART RATE: 117 BPM | DIASTOLIC BLOOD PRESSURE: 71 MMHG

## 2023-10-03 PROCEDURE — 99204 OFFICE O/P NEW MOD 45 MIN: CPT

## 2023-10-03 RX ORDER — ERGOCALCIFEROL 1.25 MG/1
1.25 MG CAPSULE, LIQUID FILLED ORAL
Qty: 6 | Refills: 0 | Status: DISCONTINUED | COMMUNITY
Start: 2021-11-15 | End: 2023-10-03

## 2023-10-12 ENCOUNTER — APPOINTMENT (OUTPATIENT)
Dept: MRI IMAGING | Facility: CLINIC | Age: 33
End: 2023-10-12
Payer: COMMERCIAL

## 2023-10-12 ENCOUNTER — OUTPATIENT (OUTPATIENT)
Dept: OUTPATIENT SERVICES | Facility: HOSPITAL | Age: 33
LOS: 1 days | End: 2023-10-12
Payer: COMMERCIAL

## 2023-10-12 DIAGNOSIS — R90.89 OTHER ABNORMAL FINDINGS ON DIAGNOSTIC IMAGING OF CENTRAL NERVOUS SYSTEM: ICD-10-CM

## 2023-10-12 DIAGNOSIS — G25.9 EXTRAPYRAMIDAL AND MOVEMENT DISORDER, UNSPECIFIED: ICD-10-CM

## 2023-10-12 DIAGNOSIS — Z00.8 ENCOUNTER FOR OTHER GENERAL EXAMINATION: ICD-10-CM

## 2023-10-12 PROCEDURE — 70553 MRI BRAIN STEM W/O & W/DYE: CPT | Mod: 26

## 2023-10-12 PROCEDURE — 70553 MRI BRAIN STEM W/O & W/DYE: CPT

## 2023-10-12 PROCEDURE — 76390 MR SPECTROSCOPY: CPT | Mod: 26

## 2023-10-12 PROCEDURE — 76390 MR SPECTROSCOPY: CPT

## 2023-10-12 PROCEDURE — A9585: CPT

## 2023-10-14 LAB
CULTURE RESULTS: SIGNIFICANT CHANGE UP
SPECIMEN SOURCE: SIGNIFICANT CHANGE UP

## 2023-10-24 ENCOUNTER — APPOINTMENT (OUTPATIENT)
Dept: MRI IMAGING | Facility: CLINIC | Age: 33
End: 2023-10-24

## 2023-10-30 ENCOUNTER — TRANSCRIPTION ENCOUNTER (OUTPATIENT)
Age: 33
End: 2023-10-30

## 2023-10-30 ENCOUNTER — APPOINTMENT (OUTPATIENT)
Dept: NEUROLOGY | Facility: CLINIC | Age: 33
End: 2023-10-30
Payer: COMMERCIAL

## 2023-10-30 VITALS
BODY MASS INDEX: 22.9 KG/M2 | SYSTOLIC BLOOD PRESSURE: 110 MMHG | WEIGHT: 160 LBS | DIASTOLIC BLOOD PRESSURE: 75 MMHG | HEART RATE: 120 BPM | HEIGHT: 70 IN

## 2023-10-30 DIAGNOSIS — R26.9 UNSPECIFIED ABNORMALITIES OF GAIT AND MOBILITY: ICD-10-CM

## 2023-10-30 DIAGNOSIS — G25.9 EXTRAPYRAMIDAL AND MOVEMENT DISORDER, UNSPECIFIED: ICD-10-CM

## 2023-10-30 PROCEDURE — 99214 OFFICE O/P EST MOD 30 MIN: CPT

## 2023-11-01 LAB
CULTURE RESULTS: SIGNIFICANT CHANGE UP
CULTURE RESULTS: SIGNIFICANT CHANGE UP
SPECIMEN SOURCE: SIGNIFICANT CHANGE UP
SPECIMEN SOURCE: SIGNIFICANT CHANGE UP

## 2023-11-08 ENCOUNTER — TRANSCRIPTION ENCOUNTER (OUTPATIENT)
Age: 33
End: 2023-11-08

## 2023-11-09 ENCOUNTER — TRANSCRIPTION ENCOUNTER (OUTPATIENT)
Age: 33
End: 2023-11-09

## 2023-11-10 LAB
ALBUMIN SERPL ELPH-MCNC: 4.9 G/DL
ALP BLD-CCNC: 65 U/L
ALT SERPL-CCNC: 24 U/L
ANION GAP SERPL CALC-SCNC: 13 MMOL/L
AST SERPL-CCNC: 15 U/L
BASOPHILS # BLD AUTO: 0.1 K/UL
BASOPHILS NFR BLD AUTO: 0.9 %
BILIRUB SERPL-MCNC: 0.5 MG/DL
BUN SERPL-MCNC: 5 MG/DL
CALCIUM SERPL-MCNC: 9.4 MG/DL
CHLORIDE SERPL-SCNC: 103 MMOL/L
CO2 SERPL-SCNC: 26 MMOL/L
CREAT SERPL-MCNC: 0.77 MG/DL
EGFR: 106 ML/MIN/1.73M2
EOSINOPHIL # BLD AUTO: 0.07 K/UL
EOSINOPHIL NFR BLD AUTO: 0.6 %
GLUCOSE SERPL-MCNC: 87 MG/DL
HCT VFR BLD CALC: 42.7 %
HGB BLD-MCNC: 14 G/DL
IMM GRANULOCYTES NFR BLD AUTO: 1.3 %
LYMPHOCYTES # BLD AUTO: 1.89 K/UL
LYMPHOCYTES NFR BLD AUTO: 16.8 %
MAN DIFF?: NORMAL
MCHC RBC-ENTMCNC: 32.2 PG
MCHC RBC-ENTMCNC: 32.8 GM/DL
MCV RBC AUTO: 98.2 FL
MONOCYTES # BLD AUTO: 0.96 K/UL
MONOCYTES NFR BLD AUTO: 8.5 %
NEUTROPHILS # BLD AUTO: 8.09 K/UL
NEUTROPHILS NFR BLD AUTO: 71.9 %
PLATELET # BLD AUTO: 382 K/UL
POTASSIUM SERPL-SCNC: 3.7 MMOL/L
PROT SERPL-MCNC: 7 G/DL
RBC # BLD: 4.35 M/UL
RBC # FLD: 14.2 %
SODIUM SERPL-SCNC: 142 MMOL/L
WBC # FLD AUTO: 11.26 K/UL

## 2023-11-13 LAB
DEPRECATED KAPPA LC FREE/LAMBDA SER: 1.23 RATIO
FUNGITELL QUALITATIVE RESULT: NEGATIVE
FUNGITELL QUANTITATIVE VALUE: <31 PG/ML
H CAPSUL AG SPEC-ACNC: NOT DETECTED
HBV CORE IGG+IGM SER QL: NONREACTIVE
HBV CORE IGM SER QL: NONREACTIVE
HBV SURFACE AB SER QL: ABNORMAL
HBV SURFACE AG SER QL: NONREACTIVE
HISTOPLASMA AG VALUE: NOT DETECTED NG/ML
IGA SER QL IEP: 188 MG/DL
IGG SER QL IEP: 722 MG/DL
IGM SER QL IEP: 126 MG/DL
KAPPA LC CSF-MCNC: 0.94 MG/DL
KAPPA LC SERPL-MCNC: 1.16 MG/DL
VZV AB TITR SER: POSITIVE
VZV IGG SER IF-ACNC: 826.5 INDEX
VZV IGM SER IF-ACNC: <0.91 INDEX

## 2023-11-14 LAB
M TB IFN-G BLD-IMP: NEGATIVE
QUANTIFERON TB PLUS MITOGEN MINUS NIL: 8.89 IU/ML
QUANTIFERON TB PLUS NIL: 0.02 IU/ML
QUANTIFERON TB PLUS TB1 MINUS NIL: 0 IU/ML
QUANTIFERON TB PLUS TB2 MINUS NIL: 0 IU/ML

## 2023-11-16 ENCOUNTER — APPOINTMENT (OUTPATIENT)
Dept: NUCLEAR MEDICINE | Facility: CLINIC | Age: 33
End: 2023-11-16
Payer: COMMERCIAL

## 2023-11-16 PROCEDURE — A9552: CPT

## 2023-11-16 PROCEDURE — 78816 PET IMAGE W/CT FULL BODY: CPT

## 2023-11-17 ENCOUNTER — TRANSCRIPTION ENCOUNTER (OUTPATIENT)
Age: 33
End: 2023-11-17

## 2023-11-22 ENCOUNTER — TRANSCRIPTION ENCOUNTER (OUTPATIENT)
Age: 33
End: 2023-11-22

## 2023-12-12 ENCOUNTER — TRANSCRIPTION ENCOUNTER (OUTPATIENT)
Age: 33
End: 2023-12-12

## 2023-12-13 ENCOUNTER — TRANSCRIPTION ENCOUNTER (OUTPATIENT)
Age: 33
End: 2023-12-13

## 2023-12-15 DIAGNOSIS — R90.89 OTHER ABNORMAL FINDINGS ON DIAGNOSTIC IMAGING OF CENTRAL NERVOUS SYSTEM: ICD-10-CM

## 2023-12-19 ENCOUNTER — APPOINTMENT (OUTPATIENT)
Dept: NEUROLOGY | Facility: CLINIC | Age: 33
End: 2023-12-19
Payer: COMMERCIAL

## 2023-12-19 VITALS — DIASTOLIC BLOOD PRESSURE: 76 MMHG | HEART RATE: 118 BPM | SYSTOLIC BLOOD PRESSURE: 105 MMHG

## 2023-12-19 PROCEDURE — 99214 OFFICE O/P EST MOD 30 MIN: CPT

## 2023-12-19 NOTE — ASSESSMENT
[FreeTextEntry1] : Assessment/Plan: Diffuse CNS nodular leptomeningeal disease (dx'd 2/2023)-  This is diagnostically challenging case of diffuse leptomeningeal enhancement (predominately basilar involvement) with involvement of brain parenchyma (b/l basal ganglia) with work up revealing necrotizing granuloma on cervical LN biopsy. Current working diagnosis has been neurosarcoidosis, for which patient remains on prednisone course with clinical improvement but with persistent leptomeningeal enhancement and progression of parenchymal disease.  The atypical features of her case:- - Necrotizing granuloma (though rare cases of sarcoid with necrotizing granulomatous pattern has been reported in literature) are more commonly reported in cases of TB and fungal infection - Significant hypoglycorrhachia (low CSF glucose), need to rule out infection and malignancy - Persistent leptomeningeal enhancement and progression of parenchymal disease despite pulse dose steroids and prolonged steroid taper.  Imp:- Diffuse leptomeningeal disease with cervical LN biopsy path revealing necrotizing granuloma- likely sarcoid with necrotizing granulomatous pattern. However, given above red flags, would recommend obtaining further tissue sample to rule out infection and malignancy, before treating patient with Remicade(infliximab).         Given episodic right sided hemiparesis, will obtain EEG and repeat MRI imaging.    Plan:- [] Repeat MRI brain, C and T spine w/w/o contrast to rule out new lesions [] rEEG for episodic symptoms (low suspicion for seizures) [] Referred to Heck/Neck surgery for excisional biopsy of cervical lymph node to rule out malignancy/infection [] Remicade approval pending [] Continue prednisone 20 mg QD for now [] Continue pantoprazole 40 mg QD [] Increased gabapentin to 300 mg TID. Reviewed s/e.  [] Continue PT for strengthening and balance training.  The above plan was discussed with FELIPA WRIGHT in great detail. FELIPA WRIGHT verbalized understanding and agrees with plan as detailed above. Patient was provided education and counselling on current diagnosis/symptoms. She was advised to call our clinic at [phone] for any new or worsening symptoms, or with any questions or concerns. FELIPA WRIGHT expressed understanding and all her questions/concerns were addressed.  Irma Krishnan M.D.

## 2023-12-19 NOTE — DATA REVIEWED
[de-identified] : Repeat PET scan 11/16/2023- stable ( persistent hypermetabolic R cervical lymph nodes)  MR spect 10/12/2023 normal. Repeat MRI brain w/w/o 10/12/2023 with resolution of enhancement in R BG and possibly mild improvement in basilar leptomeningeal enhancement. Signal changes in central satinder and L BG seem more prominent on current scan. no new lesions/enhancement.    MRI brain, cervical and thoracic spine w/w/o 8/15/2023- C and T spine w/ similar extensive cord surface leptomeningeal enhancement compared w/ 2/2023. 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   CT chest/abd/pelvis 5/11/23- neg for malignancy/lymphadenopathy MRI brain and orbits w/w/o 5/11/2023- Stable diffuse leptomeningeal enh.  FDG PET scan 7/3/2023- Few FDG-avid right cervical lymph nodes (needle biopsy recommended)   Imaging at [facility] - - CTA H/N and CTV head 2/18/2023- no vascular stenosis, dissection or aneurysm. No dural venous sinus thrombosis.  MRI brain and C/T spine w/w/o contrast 2/20/2023- Diffuse leptomeningeal disease of brain and spinal cord, scattered nodular enhancement pattern in brain and cervicomedullary junction, involvement of optic chiasm, b/l prox ON, satinder/medulla/cerebellum with b/l cranial nerves involved. A few punctuate subcortical WM lesions. .    [de-identified] :   serum labs 9/6/2023:- ESR 26. C3 164 (normal ) Leukocytosis has improved, 19 K (6/2023) and now 11k. ANC 16K and now 8k. Normal labs: C4, Ig4, SPEP, Vitamin D 1,25 and 25, CRP, ACE, KAILEY, DsDNA, RF, SS, ANCA, Lyme, Hep panel, Quant TB, Syphilis, histoplasma ab/antigen, B2 glycoprotein ab, HIV.  CSF 9/15/2023: TNC 18 (lymph pred) MBP 7.4 Glucose 19 Protein 117 Lactate 5.7 [normal 1.1-2.4] ACE 4.6 [ normal 0-2.5] b2 microglobulin 5.1 [normal 0-2.4] PCR neg, WNV neg, VDRL neg Fungitell neg. fungal cx neg Histoplasma neg Culture/gram stain neg No AFB staining done, no AFB on culture to date Cytology/flow: neg cancer cells, scattered lymphoctyes and mononuclear cells (4% B cells without any surface Igs) MOG neg  Labs 5/11/23- Interleukin 2 recep neg, ACE neg, CASSI nl, RF nl, ESR and CRP nl. SSA/B nl. Vitamin D1.25 nl, KAILEY nl, DsDNA nl.

## 2023-12-19 NOTE — HISTORY OF PRESENT ILLNESS
[FreeTextEntry1] : INTERIM HX 12/19/2023: Repeat PET scan 11/16/2023- stable (persistent hypermetabolic R cervical lymph nodes). Referred to Head/neck surgery for excisional biopsy to get more tissue sample to rule out infection/malignancy. Being discharged from OT, continuing PT- working on balance. 4-6 episodes of right sided weakness (RUE /RLE) that last < 20 min, in the last 2 weeks. She has been under more stress at work. Tingling in legs a little more bothersome. Walking better.   INTERIM HX 10/30/2023: Clinically doing well, PT has rly helped her. Feels more confident walking without a cane now. Balance better. Denies headaches, dizziness less often. No diplopia. Diagnosed with early glaucoma.   Seen by NSGY for potential brain biopsy, deferred to due resolution of enhancement of R BG lesion and high risk procedure. Pt remains on prednisone 20 mg QD. MR spect 10/12/2023 normal. Repeat MRI brain w/w/o 10/12/2023 with resolution of enhancement in R BG and possibly mild improvement in basilar leptomeningeal enhancement. Signal changes in central satinder and L BG seem more prominent on current scan. no new lesions/enhancement.   serum labs 9/6/2023:- ESR 26. C3 164 (normal ) Leukocytosis has improved, 19 K (6/2023) and now 11k. ANC 16K and now 8k. Normal labs: C4, Ig4, SPEP, Vitamin D 1,25 and 25, CRP, ACE, KAILEY, DsDNA, RF, SS, ANCA, Lyme, Hep panel, Quant TB, Syphilis, histoplasma ab/antigen, B2 glycoprotein ab, HIV.  CSF 9/15/2023: TNC 18 (lymph pred) MBP 7.4 Glucose 19 Protein 117 Lactate 5.7 [normal 1.1-2.4] ACE 4.6 [ normal 0-2.5] b2 microglobulin 5.1 [normal 0-2.4] PCR neg, WNV neg, VDRL neg Fungitell neg. fungal cx neg Histoplasma neg Culture/gram stain neg No AFB staining done, no AFB on culture to date Cytology/flow: neg cancer cells, scattered lymphoctyes and mononuclear cells (4% B cells without any surface Igs) MOG neg  INTERIM HX 08/25/2023: Here to review results of cervical LN biopsy results and MRI results. cervical LN biopsy- c/w necrotizing granulomas, negative for malignant cells, no organisms on special stains (AFB, GMS). MRI brain, cervical and thoracic spine w/w/o 8/15/2023- C and T spine w/ similar extensive cord surface leptomeningeal enhancement compared w/ 2/2023. 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   Pt remains on prednisone 20 mg QD. Reports more good days lately. Did have an episode 2 weeks ago, she was out in the heat and came back in and felt very dizzy and body felt weak, could not keep herself up. No LOC or seizure likely activity.  Overall, she reports headaches have resolved, diplopia resolved, vision loss improved. Dizziness is on and off. GAit is still not normal, but balance a little better. In PT and OT. Tingling in extremities is constant. Joint pains is stable and intermittent.  She believes steroids has helped with clinical symptoms some, and did note a little worsening at times of tapering down dosage.  INTERIM HX 07/24/2023: After our last visit, pt completed several studies (detailed below), also completed 5 days IV solumedrol 4/28, 5/1-5/4, now on prednisone taper (currently at 20 mg QD) Labs 5/11/23- Interleukin 2 recep neg, ACE neg, CASSI nl, RF nl, ESR and CRP nl. SSA/B nl. Vitamin D1.25 nl, KAILEY nl, DsDNA nl. CT chest/abd/pelvis 5/11/23- neg for malignancy/lymphadenopathy MRI brain and orbits w/w/o 5/11/2023- Stable diffuse leptomeningeal enh.  FDG PET scan 7/3/2023- Few FDG-avid right cervical lymph nodes (needle biopsy recommended)  Seen by neuro oph (Dr Chawla) 7/10- VA 20/40 OD and 20/30 OS, trace APD on the left. superior arcuate VF defect OS > OS.  Never did PT. On prednisone 20 mg QD for the past 2 weeks. Having more bad days then good days- muscle fatigue at end of day, constant tingling throughout the body- maybe due to her starting work. Uses a cane. No longer has diplopia. Occasional vertigo. No brand new symptoms. "Good days are very good".  --------------------------------------------------------------------------------------------------------------------------------------- HPI (4/20/2023)- 32 year old RH female, previously healthy, developed new onset intermittent bifrontal HA, dizziness/vertigo, gait imbalance and diplopia symptoms beginning 2/2023. She was seen by Dr. Estephania James (neuro) 2/13 for her symptoms and MRI's were recommended. However, given new onset b/l LE weakness/numbness/tingling, she presented to [facility] on 2/18, where she was admitted and underwent work up- CT head, CTA/CTV head, MRI of neuroaxis and spinal tap, with work up revealing diffuse leptomeningeal enhancement of brain and spinal cord, and spinal tap showing 34 TNC (85% lymph), glucose 16 (low), protein 207, MBP 13.7, matched and unique OCB (5), MOG neg, enceph panel neg, Culture/gram stain/AFB/PCR neg, ACE 7.1 (normal 0-2.5), flow cytometry with increased small lymph. Serum studies ( Quant TB, KAILEY, Syphilis, ACE negative), ESR 16. She was seen by ID and Neuro. Recommended outpatient follow up. No steroids given in hospital. She left for DR x 10 days (vacation), 1 week after hospital discharge.    Imaging at [facility] - - CTA H/N and CTV head 2/18/2023- no vascular stenosis, dissection or aneurysm. No dural venous sinus thrombosis.  MRI brain and C/T spine w/w/o contrast 2/20/2023- Diffuse leptomeningeal disease of brain and spinal cord, scattered nodular enhancement pattern in brain and cervicomedullary junction, involvement of optic chiasm, b/l prox ON, satinder/medulla/cerebellum with b/l cranial nerves involved. A few punctuate subcortical WM lesions.  Interim Hx- -  She continues to be symptomatic. HA and diplopia resolved. Experiences intermittent "dimming" of vision peripherally b/l x 1 week. Sees ophthalmologist (Dr. 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  No pain with eye movements. No vertigo, but has imbalance (not better, more frequent). Intermittent tingling over arms and legs- more frequent. Weakness in legs.   She is from . Works for [employer] in [place].

## 2023-12-19 NOTE — PHYSICAL EXAM
[FreeTextEntry1] : PHYSICAL EXAM Constitutional: Alert, no acute distress Psychiatric: appropriate affect and mood Pulmonary: No respiratory distress, stable on room air  NEUROLOGICAL EXAM Mental status: The patient is alert, attentive and conversational memory intact. Speech/language: No dysarthria Cranial nerves: CN II: Pupils large (9 mm OD and 11 mm OS), briskly reactive to light OD and mild reaction OS. VA 20/40 OU with pinhole (pt forgot her glasses). CN III, IV, VI: B/l end gaze nystagmus, mild R eyelid ptosis CN V: Facial sensation is intact to pinprick in all 3 divisions bilaterally. CN VII: Face is symmetric with normal eye closure and smile. CN VII: Hearing is normal to rubbing fingers CN IX, X: Palate elevates symmetrically. Phonation is normal. CN XI: Head turning and shoulder shrug are intact CN XII: Tongue is midline with normal movements and no atrophy. Motor: b/l HF weakness 4+/5, otherwise 5/5 all muscle groups Reflexes: Biceps 3+, Patellar 2+ on right and 1+ on left, ankles 2+ on right and 1+ on left.  No clonus. Plantars flexors b/l.  No holguin. Sensory: Sensations to LT/PP intact UE and LE Coordination: There is no dysmetria on finger-to-nose, very mild dysmetria on heel to shin b/l with intention tremor. Gait/Stance: Mildy broad based and very mild ataxia. Mild Imbalance on tandem gait. Romberg positive. Can walk without cane.

## 2023-12-20 ENCOUNTER — TRANSCRIPTION ENCOUNTER (OUTPATIENT)
Age: 33
End: 2023-12-20

## 2023-12-22 ENCOUNTER — TRANSCRIPTION ENCOUNTER (OUTPATIENT)
Age: 33
End: 2023-12-22

## 2023-12-23 ENCOUNTER — RESULT REVIEW (OUTPATIENT)
Age: 33
End: 2023-12-23

## 2023-12-27 ENCOUNTER — APPOINTMENT (OUTPATIENT)
Dept: NEUROLOGY | Facility: CLINIC | Age: 33
End: 2023-12-27
Payer: COMMERCIAL

## 2023-12-27 ENCOUNTER — TRANSCRIPTION ENCOUNTER (OUTPATIENT)
Age: 33
End: 2023-12-27

## 2023-12-27 ENCOUNTER — APPOINTMENT (OUTPATIENT)
Dept: OTOLARYNGOLOGY | Facility: CLINIC | Age: 33
End: 2023-12-27
Payer: COMMERCIAL

## 2023-12-27 VITALS
OXYGEN SATURATION: 97 % | SYSTOLIC BLOOD PRESSURE: 124 MMHG | HEART RATE: 122 BPM | BODY MASS INDEX: 30.58 KG/M2 | WEIGHT: 162 LBS | HEIGHT: 61 IN | DIASTOLIC BLOOD PRESSURE: 86 MMHG | RESPIRATION RATE: 17 BRPM

## 2023-12-27 DIAGNOSIS — B37.0 CANDIDAL STOMATITIS: ICD-10-CM

## 2023-12-27 DIAGNOSIS — Z78.9 OTHER SPECIFIED HEALTH STATUS: ICD-10-CM

## 2023-12-27 DIAGNOSIS — R22.1 LOCALIZED SWELLING, MASS AND LUMP, NECK: ICD-10-CM

## 2023-12-27 PROCEDURE — 31575 DIAGNOSTIC LARYNGOSCOPY: CPT

## 2023-12-27 PROCEDURE — 99204 OFFICE O/P NEW MOD 45 MIN: CPT | Mod: 25

## 2023-12-27 PROCEDURE — 95816 EEG AWAKE AND DROWSY: CPT

## 2023-12-28 ENCOUNTER — RESULT REVIEW (OUTPATIENT)
Age: 33
End: 2023-12-28

## 2023-12-28 NOTE — PHYSICAL EXAM
[Midline] : trachea located in midline position [Normal] : no rashes [de-identified] : Thrush noted

## 2023-12-28 NOTE — CONSULT LETTER
[Dear  ___] : Dear  [unfilled], [Consult Letter:] : I had the pleasure of evaluating your patient, [unfilled]. [Please see my note below.] : Please see my note below. [Consult Closing:] : Thank you very much for allowing me to participate in the care of this patient.  If you have any questions, please do not hesitate to contact me. [Sincerely,] : Sincerely, [FreeTextEntry2] : Irma Krishnan MD

## 2023-12-28 NOTE — ASSESSMENT
[FreeTextEntry1] : 33 year old with PMHx of leptomeningeal disease is referred by  for evaluation of cervical lymphadenopathy. Recent PET scan she was found to have Hypermetabolic right cervical nodes  -Reviewed imaging in depth with patient and family member -Fiberoptic exam showed, left vocal cord immobility- unclear etiology, no compressive mass lesion seen along the course of RLN, may be autoimmune or idiopathic (post-viral) in nature. No indication medialization at this time given no clinical aspiration or major vocal deficits.  -1.1 cm right level 1b FGD avid persistent lymph node, Will obtain core biopsy FNA -Nystatin swish and spit, for thrush noted on physical exam -Follow up in 1 month  to discuss pathology and next steps. May need rheum consultation if concern for autoimmune process.  -They are in agreement with this plan

## 2023-12-28 NOTE — HISTORY OF PRESENT ILLNESS
[de-identified] : 33 year old with PMHx of leptomeningeal disease is referred by  for evaluation of cervical lymphadenopathy. On recent PET scan she was found to have Hypermetabolic right cervical nodes Currently taking prednisone 10mg and pantoprazole daily  Reports occasional dyspnea, no supplemental oxygen required.  Reports occasional fatigue and facial bloating. She believes it is due to prednisone.  Patient denies dysphagia, odynophagia, dysphonia, cough, neck swelling, neck pain, throat pain or recent throat infections.  No new lumps or bumps noted.  Weight remains stable.     12/4/23: PET:  HEAD/NECK: Physiologic FDG activity in visualized brain, head,and neck. -Leptomeninges and nerves are are below resolution of PET/CT and basal ganglia is not well evaluated on FDG PET/CT due to intense physiologic brain activity. Continued evaluation of the brain with MRI, the imaging procedure of choice, recommended. -Hypermetabolic right cervical nodes are again noted similar to prior examination; for example right level IB (image 71) stable at 1.1 x 0.5 cm with SUV 9.3 (previously SUV 5.0); referenced right level III node (image 74) 1.4 x 0.7 cm within SUV 6.3 (previously 1.3 x 0.8 cm with SUV 5.4). Right level IV (image 83 also stable at 0.6 x 0.4 cm with SUV 3.7 (previously 2.4). No new FDG avid node. If not already performed, sydnee biopsy  IMPRESSION: No interval changes since the prior PET/CT. 1. Hypermetabolic right cervical nodes similar to prior examination, no new FDG avid nodes. 2. Limited evaluation of the leptomeninges and brain on PET/CT; MRI is the imaging procedure of choice for evaluation of the brain. 3. No focal abnormal activity in the remaining field of view that is suggestive of focal infection or inflammation or FDG avid malignancy. --- End of Report ---

## 2023-12-28 NOTE — REASON FOR VISIT
[Initial Evaluation] : an initial evaluation for [FreeTextEntry1] : Cervical lymphadenopathy  [FreeTextEntry2] : Cervical lymphadenopathy

## 2024-01-02 ENCOUNTER — APPOINTMENT (OUTPATIENT)
Dept: MRI IMAGING | Facility: CLINIC | Age: 34
End: 2024-01-02
Payer: COMMERCIAL

## 2024-01-02 ENCOUNTER — TRANSCRIPTION ENCOUNTER (OUTPATIENT)
Age: 34
End: 2024-01-02

## 2024-01-02 PROCEDURE — A9585: CPT

## 2024-01-02 PROCEDURE — 70546 MR ANGIOGRAPH HEAD W/O&W/DYE: CPT

## 2024-01-02 PROCEDURE — A9585: CPT | Mod: JW

## 2024-01-02 PROCEDURE — 70549 MR ANGIOGRAPH NECK W/O&W/DYE: CPT

## 2024-01-03 ENCOUNTER — TRANSCRIPTION ENCOUNTER (OUTPATIENT)
Age: 34
End: 2024-01-03

## 2024-01-09 ENCOUNTER — TRANSCRIPTION ENCOUNTER (OUTPATIENT)
Age: 34
End: 2024-01-09

## 2024-01-11 ENCOUNTER — TRANSCRIPTION ENCOUNTER (OUTPATIENT)
Age: 34
End: 2024-01-11

## 2024-01-17 ENCOUNTER — APPOINTMENT (OUTPATIENT)
Dept: ULTRASOUND IMAGING | Facility: IMAGING CENTER | Age: 34
End: 2024-01-17
Payer: COMMERCIAL

## 2024-01-17 ENCOUNTER — OUTPATIENT (OUTPATIENT)
Dept: OUTPATIENT SERVICES | Facility: HOSPITAL | Age: 34
LOS: 1 days | End: 2024-01-17
Payer: COMMERCIAL

## 2024-01-17 ENCOUNTER — RESULT REVIEW (OUTPATIENT)
Age: 34
End: 2024-01-17

## 2024-01-17 DIAGNOSIS — R22.1 LOCALIZED SWELLING, MASS AND LUMP, NECK: ICD-10-CM

## 2024-01-17 PROCEDURE — 88341 IMHCHEM/IMCYTCHM EA ADD ANTB: CPT

## 2024-01-17 PROCEDURE — 88185 FLOWCYTOMETRY/TC ADD-ON: CPT

## 2024-01-17 PROCEDURE — 88342 IMHCHEM/IMCYTCHM 1ST ANTB: CPT

## 2024-01-17 PROCEDURE — 88172 CYTP DX EVAL FNA 1ST EA SITE: CPT

## 2024-01-17 PROCEDURE — 87205 SMEAR GRAM STAIN: CPT

## 2024-01-17 PROCEDURE — 38505 NEEDLE BIOPSY LYMPH NODES: CPT

## 2024-01-17 PROCEDURE — 88184 FLOWCYTOMETRY/ TC 1 MARKER: CPT

## 2024-01-17 PROCEDURE — 88312 SPECIAL STAINS GROUP 1: CPT

## 2024-01-17 PROCEDURE — 88189 FLOWCYTOMETRY/READ 16 & >: CPT

## 2024-01-17 PROCEDURE — 88173 CYTOPATH EVAL FNA REPORT: CPT

## 2024-01-17 PROCEDURE — 88305 TISSUE EXAM BY PATHOLOGIST: CPT

## 2024-01-17 PROCEDURE — 88360 TUMOR IMMUNOHISTOCHEM/MANUAL: CPT

## 2024-01-17 PROCEDURE — 76942 ECHO GUIDE FOR BIOPSY: CPT

## 2024-01-17 PROCEDURE — 88307 TISSUE EXAM BY PATHOLOGIST: CPT

## 2024-01-18 ENCOUNTER — TRANSCRIPTION ENCOUNTER (OUTPATIENT)
Age: 34
End: 2024-01-18

## 2024-01-25 LAB — NON-GYNECOLOGICAL CYTOLOGY STUDY: SIGNIFICANT CHANGE UP

## 2024-01-27 ENCOUNTER — APPOINTMENT (OUTPATIENT)
Dept: MRI IMAGING | Facility: IMAGING CENTER | Age: 34
End: 2024-01-27

## 2024-01-28 ENCOUNTER — APPOINTMENT (OUTPATIENT)
Dept: MRI IMAGING | Facility: IMAGING CENTER | Age: 34
End: 2024-01-28

## 2024-02-04 ENCOUNTER — NON-APPOINTMENT (OUTPATIENT)
Age: 34
End: 2024-02-04

## 2024-02-05 ENCOUNTER — APPOINTMENT (OUTPATIENT)
Dept: OTOLARYNGOLOGY | Facility: CLINIC | Age: 34
End: 2024-02-05
Payer: COMMERCIAL

## 2024-02-05 VITALS — WEIGHT: 162 LBS | HEIGHT: 61 IN | BODY MASS INDEX: 30.58 KG/M2

## 2024-02-05 PROCEDURE — 31575 DIAGNOSTIC LARYNGOSCOPY: CPT

## 2024-02-05 PROCEDURE — 99214 OFFICE O/P EST MOD 30 MIN: CPT | Mod: 25

## 2024-02-05 NOTE — PHYSICAL EXAM
[Midline] : trachea located in midline position [Normal] : no rashes [de-identified] : Thrush noted

## 2024-02-05 NOTE — ASSESSMENT
[FreeTextEntry1] : 33 year old with PMHx of leptomeningeal disease is referred by  for evaluation of cervical lymphadenopathy. Recent PET scan she was found to have Hypermetabolic right cervical nodes  -Reviewed imaging and pathlology in depth with patient and family member -1.1 cm right level 1b FGD avid persistent lymph node core biopsy pathology consistent with  Granulomatous inflammation with fibrosis.--Discussed with Dr Aguilar, no need for excisional bx at this time. -Fiberoptic exam showed, left vocal cord immobility- unclear etiology, no compressive mass lesion seen along the course of RLN, may be autoimmune or idiopathic (post-viral) in nature. No indication medialization at this time given no clinical aspiration or major vocal deficits.  -Follow up with Neurologist, Dr. Krishnan, will also reach out to discuss today's office visit--will try to expedite follow up appt.  -May need rheum consultation if concern for autoimmune process.  -Follow up in 3 months to reassess the left vocal cord  -They are in agreement with this plan

## 2024-02-05 NOTE — HISTORY OF PRESENT ILLNESS
[de-identified] : 33 year old with PMHx of leptomeningeal disease is referred by  for follow up of cervical lymphadenopathy. On recent PET scan she was found to have Hypermetabolic right cervical nodes. She is s/p core biopsy of level 1 right lymph node 1/17/24  Currently taking prednisone 10mg and pantoprazole daily  Reports occasional dyspnea, no supplemental oxygen required.  Reports occasional fatigue and facial bloating. She believes it is due to prednisone.  Patient denies dysphagia, odynophagia, dysphonia, cough, neck swelling, neck pain, throat pain or recent throat infections.  No new lumps or bumps noted.  Weight remains stable.    1/17/24: Core biopsy: 	 Dominik Accession Number : [id number] Patient:     FELIPA WRIGHT Accession:                             [id number] Collected Date/Time:                   1/17/2024 15:45 EST Received Date/Time:                    1/17/2024 21:09 EST Fine Needle Aspiration Report - Auth (Verified)  Specimen(s) Submitted LYMPH NODE, LEVEL 1, RIGHT, US GUIDED CORE BIOPSY AND FNA Final Diagnosis Flow Cytometry (23-GY-): 1.  Lymph node, level 1, right, US-guided core biopsy and FNA: Granulomatous inflammation with fibrosis.  See note and description. Diagnostic note: The patient's prior biopsy on the same site showed necrotizing granulomas ([id number], 8/2/2023).  In conjunction with the previous findings, the current findings are consistent with persistent granulomatous inflammation. The presence of fibrosis without definitive necrosis suggests chronic or resolving disease, or may represent post-treatment effect.  Clinical correlation is recommended. There is no evidence of lymphoma or other malignancies. If clinically indicated, an excisional biopsy may be considered for further diagnostic evaluation.  Microscopic description: The cytology smears display a population of scattered lymphocytes, few lymphoid tangles in a background of blood. No Shiv-Nusrat cells or epithelial cells present. The cell block is non-contributory.  The core biopsy shows small fragments of soft tissue consisting of fibrosis, small lymphoid aggregates of small round lymphocytes, and clusters of epithelioid histiocytes consistent with granulomas. Necrosis is not conspicuous.  Atypical cells or viral inclusions are not noted.  AFB is negative for acid-fast bacilli.  GMS is negative for fungal organisms _ Immunohistochemistry: Immunostains for the following markers were performed on block 1B: CD3, CD5, CD20, PAX5, CD10, BCL6, BCL2, Cyclin D1, Ki67, MUM1  The lymphocytes are comprised mostly of CD3+CD5+ T-cells admixed with a smaller number of PAX5+CD20+ B-cells, BCL6 and CD10 do not identify germinal centers, and are negative in the lymphocytes. BCL2 highlights the lymphocytes. Cyclin D1 is negative in the lymphocytes. MUM1 is positive in scattered cells.  Ki-67 proliferation index is low (<5%). _ Flow cytometry analysis ([id number]): The lymphocyte immunophenotypic findings show no diagnostic abnormalities.   Lymphoma work-up Slide(s) with built in immunohistochemical study control(s) associated and negative control with this case has/have been verified by the sign out pathologist.  For slide(s) without built in controls positive control slides has/have been reviewed and approved by immunohistochemistry lab These immunohistochemical/ in-situ hybridization tests have been developed and their performance characteristics determined by [facility], Department of Pathology, Division of Immunopathology, [address] [place] [address].  It has not been cleared or approved by the U.S. Food and Drug Administration. The FDA has determined that such clearance or approval is not necessary. This test is used for clinical purposes.  The laboratory is certified under the CLIA-88 as qualified to perform high complexity clinical testing.  12/4/23: PET:  HEAD/NECK: Physiologic FDG activity in visualized brain, head,and neck. -Leptomeninges and nerves are are below resolution of PET/CT and basal ganglia is not well evaluated on FDG PET/CT due to intense physiologic brain activity. Continued evaluation of the brain with MRI, the imaging procedure of choice, recommended. -Hypermetabolic right cervical nodes are again noted similar to prior examination; for example right level IB (image 71) stable at 1.1 x 0.5 cm with SUV 9.3 (previously SUV 5.0); referenced right level III node (image 74) 1.4 x 0.7 cm within SUV 6.3 (previously 1.3 x 0.8 cm with SUV 5.4). Right level IV (image 83 also stable at 0.6 x 0.4 cm with SUV 3.7 (previously 2.4). No new FDG avid node. If not already performed, sydnee biopsy  IMPRESSION: No interval changes since the prior PET/CT. 1. Hypermetabolic right cervical nodes similar to prior examination, no new FDG avid nodes. 2. Limited evaluation of the leptomeninges and brain on PET/CT; MRI is the imaging procedure of choice for evaluation of the brain. 3. No focal abnormal activity in the remaining field of view that is suggestive of focal infection or inflammation or FDG avid malignancy. --- End of Report ---

## 2024-02-08 ENCOUNTER — TRANSCRIPTION ENCOUNTER (OUTPATIENT)
Age: 34
End: 2024-02-08

## 2024-02-13 ENCOUNTER — APPOINTMENT (OUTPATIENT)
Dept: NEUROLOGY | Facility: CLINIC | Age: 34
End: 2024-02-13
Payer: COMMERCIAL

## 2024-02-13 PROCEDURE — 99214 OFFICE O/P EST MOD 30 MIN: CPT

## 2024-03-01 NOTE — ASSESSMENT
[FreeTextEntry1] : Assessment/Plan: 32 yo female w/ diffuse leptomeningeal enhancement (predominately basilar involvement) with involvement of brain parenchyma (b/l basal ganglia)- dx'd 2/2023. She has been on steroid treatment since April/May 2023 with some clinical improvement, however MR imaging showed slight progression in leptomeningeal and parenchymal disease despite high dose steroids. A cervical LN biopsy revelated "necrotizing granuloma", however work up for malignancy, infection (TB, fungal) and vasculitis has been negative to date. 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 ? sarcoid with necrotizing granulomatous pattern.  Given persistent leptomeningeal enhancement despite high dose and prolonged prednisone treatment, she is a candidate for Remicade therapy at this time.   Plan:- [] Remicade approval pending - will re submit [] Continue prednisone 20 mg QD for now [] Continue pantoprazole 40 mg QD [] Increased gabapentin to 300/300/600. Reviewed s/e. Can consider further increase in dose as tolerated.  [] Continue PT for strengthening and balance training.  The above plan was discussed with FELIPA WRIGHT in great detail. FELIPA WRIGHT verbalized understanding and agrees with plan as detailed above. Patient was provided education and counselling on current diagnosis/symptoms. She was advised to call our clinic at [phone] for any new or worsening symptoms, or with any questions or concerns. FELIPA WRIGHT expressed understanding and all her questions/concerns were addressed.  Irma Krishnan M.D.

## 2024-03-01 NOTE — HISTORY OF PRESENT ILLNESS
[FreeTextEntry1] : INTERIM HX 02/13/2024: [This is telehealth 2 -way video visit]  Pt home for snow day. She reports more bad days than good days. Neuropathic pain has been more bothersome, she is taking gabapentin 300 mg TID.  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 No evidence of lymphoma or malignancy. AFB negative for acid fast bacilli, GMS negative for fungal organisms.   MR brain w/w/o 12/28/2023- no change since 10/12/2023, reidentification of dominantly basilar leptomeningeal enhancement. Stable abnormal signal in R>L basal ganglia. No acute stroke or hemorrhage.  MR C spine w/w/o 12/28/2023- stable compared to 2/20/2023 and 8/2023.  MRA head and neck 1/2/2024-unremarkable.   EEG 12/27/2023- normal    INTERIM HX 12/19/2023: Repeat PET scan 11/16/2023- stable (persistent hypermetabolic R cervical lymph nodes). Referred to Head/neck surgery for excisional biopsy to get more tissue sample to rule out infection/malignancy. Being discharged from OT, continuing PT- working on balance. 4-6 episodes of right sided weakness (RUE /RLE) that last < 20 min, in the last 2 weeks. She has been under more stress at work. Tingling in legs a little more bothersome. Walking better.   INTERIM HX 10/30/2023: Clinically doing well, PT has rly helped her. Feels more confident walking without a cane now. Balance better. Denies headaches, dizziness less often. No diplopia. Diagnosed with early glaucoma.   Seen by NSGY for potential brain biopsy, deferred to due resolution of enhancement of R BG lesion and high risk procedure. Pt remains on prednisone 20 mg QD. MR spect 10/12/2023 normal. Repeat MRI brain w/w/o 10/12/2023 with resolution of enhancement in R BG and possibly mild improvement in basilar leptomeningeal enhancement. Signal changes in central satinder and L BG seem more prominent on current scan. no new lesions/enhancement.   serum labs 9/6/2023:- ESR 26. C3 164 (normal ) Leukocytosis has improved, 19 K (6/2023) and now 11k. ANC 16K and now 8k. Normal labs: C4, Ig4, SPEP, Vitamin D 1,25 and 25, CRP, ACE, KAILEY, DsDNA, RF, SS, ANCA, Lyme, Hep panel, Quant TB, Syphilis, histoplasma ab/antigen, B2 glycoprotein ab, HIV.  CSF 9/15/2023: TNC 18 (lymph pred) MBP 7.4 Glucose 19 Protein 117 Lactate 5.7 [normal 1.1-2.4] ACE 4.6 [ normal 0-2.5] b2 microglobulin 5.1 [normal 0-2.4] PCR neg, WNV neg, VDRL neg Fungitell neg. fungal cx neg Histoplasma neg Culture/gram stain neg No AFB staining done, no AFB on culture to date Cytology/flow: neg cancer cells, scattered lymphoctyes and mononuclear cells (4% B cells without any surface Igs) MOG neg  INTERIM HX 08/25/2023: Here to review results of cervical LN biopsy results and MRI results. cervical LN biopsy- c/w necrotizing granulomas, negative for malignant cells, no organisms on special stains (AFB, GMS). MRI brain, cervical and thoracic spine w/w/o 8/15/2023- C and T spine w/ similar extensive cord surface leptomeningeal enhancement compared w/ 2/2023. 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   Pt remains on prednisone 20 mg QD. Reports more good days lately. Did have an episode 2 weeks ago, she was out in the heat and came back in and felt very dizzy and body felt weak, could not keep herself up. No LOC or seizure likely activity.  Overall, she reports headaches have resolved, diplopia resolved, vision loss improved. Dizziness is on and off. GAit is still not normal, but balance a little better. In PT and OT. Tingling in extremities is constant. Joint pains is stable and intermittent.  She believes steroids has helped with clinical symptoms some, and did note a little worsening at times of tapering down dosage.  INTERIM HX 07/24/2023: After our last visit, pt completed several studies (detailed below), also completed 5 days IV solumedrol 4/28, 5/1-5/4, now on prednisone taper (currently at 20 mg QD) Labs 5/11/23- Interleukin 2 recep neg, ACE neg, CASSI nl, RF nl, ESR and CRP nl. SSA/B nl. Vitamin D1.25 nl, KAILEY nl, DsDNA nl. CT chest/abd/pelvis 5/11/23- neg for malignancy/lymphadenopathy MRI brain and orbits w/w/o 5/11/2023- Stable diffuse leptomeningeal enh.  FDG PET scan 7/3/2023- Few FDG-avid right cervical lymph nodes (needle biopsy recommended)  Seen by neuro oph (Dr Chawla) 7/10- VA 20/40 OD and 20/30 OS, trace APD on the left. superior arcuate VF defect OS > OS.  Never did PT. On prednisone 20 mg QD for the past 2 weeks. Having more bad days then good days- muscle fatigue at end of day, constant tingling throughout the body- maybe due to her starting work. Uses a cane. No longer has diplopia. Occasional vertigo. No brand new symptoms. "Good days are very good".  --------------------------------------------------------------------------------------------------------------------------------------- HPI (4/20/2023)- 32 year old RH female, previously healthy, developed new onset intermittent bifrontal HA, dizziness/vertigo, gait imbalance and diplopia symptoms beginning 2/2023. She was seen by Dr. Estephania James (neuro) 2/13 for her symptoms and MRI's were recommended. However, given new onset b/l LE weakness/numbness/tingling, she presented to [facility] on 2/18, where she was admitted and underwent work up- CT head, CTA/CTV head, MRI of neuroaxis and spinal tap, with work up revealing diffuse leptomeningeal enhancement of brain and spinal cord, and spinal tap showing 34 TNC (85% lymph), glucose 16 (low), protein 207, MBP 13.7, matched and unique OCB (5), MOG neg, enceph panel neg, Culture/gram stain/AFB/PCR neg, ACE 7.1 (normal 0-2.5), flow cytometry with increased small lymph. Serum studies ( Quant TB, KAILEY, Syphilis, ACE negative), ESR 16. She was seen by ID and Neuro. Recommended outpatient follow up. No steroids given in hospital. She left for DR x 10 days (vacation), 1 week after hospital discharge.    Imaging at [facility] - - CTA H/N and CTV head 2/18/2023- no vascular stenosis, dissection or aneurysm. No dural venous sinus thrombosis.  MRI brain and C/T spine w/w/o contrast 2/20/2023- Diffuse leptomeningeal disease of brain and spinal cord, scattered nodular enhancement pattern in brain and cervicomedullary junction, involvement of optic chiasm, b/l prox ON, satinder/medulla/cerebellum with b/l cranial nerves involved. A few punctuate subcortical WM lesions.  Interim Hx- -  She continues to be symptomatic. HA and diplopia resolved. Experiences intermittent "dimming" of vision peripherally b/l x 1 week. Sees ophthalmologist (Dr. Tavarez, [facility] referred to neuro ophtho, told she had "pre - glaucoma", put on drops.  No pain with eye movements. No vertigo, but has imbalance (not better, more frequent). Intermittent tingling over arms and legs- more frequent. Weakness in legs.   She is from  [place] [employer] in [place].

## 2024-03-01 NOTE — DATA REVIEWED
[de-identified] :  MRA head and neck 1/2/2024-unremarkable.    MR brain w/w/o 12/28/2023- no change since 10/12/2023, reidentification of dominantly basilar leptomeningeal enhancement. Stable abnormal signal in R>L basal ganglia. No acute stroke or hemorrhage.  MR C spine w/w/o 12/28/2023- stable compared to 2/20/2023 and 8/2023.  MRA head and neck 1/2/2024-unremarkable.    Repeat PET scan 11/16/2023- stable ( persistent hypermetabolic R cervical lymph nodes)  MR spect 10/12/2023 normal. Repeat MRI brain w/w/o 10/12/2023 with resolution of enhancement in R BG and possibly mild improvement in basilar leptomeningeal enhancement. Signal changes in central satinder and L BG seem more prominent on current scan. no new lesions/enhancement.    MRI brain, cervical and thoracic spine w/w/o 8/15/2023- C and T spine w/ similar extensive cord surface leptomeningeal enhancement compared w/ 2/2023. 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   CT chest/abd/pelvis 5/11/23- neg for malignancy/lymphadenopathy MRI brain and orbits w/w/o 5/11/2023- Stable diffuse leptomeningeal enh.  FDG PET scan 7/3/2023- Few FDG-avid right cervical lymph nodes (needle biopsy recommended)   Imaging at [facility] - - CTA H/N and CTV head 2/18/2023- no vascular stenosis, dissection or aneurysm. No dural venous sinus thrombosis.  MRI brain and C/T spine w/w/o contrast 2/20/2023- Diffuse leptomeningeal disease of brain and spinal cord, scattered nodular enhancement pattern in brain and cervicomedullary junction, involvement of optic chiasm, b/l prox ON, satinder/medulla/cerebellum with b/l cranial nerves involved. A few punctuate subcortical WM lesions. .    [de-identified] :   serum labs 9/6/2023:- ESR 26. C3 164 (normal ) Leukocytosis has improved, 19 K (6/2023) and now 11k. ANC 16K and now 8k. Normal labs: C4, Ig4, SPEP, Vitamin D 1,25 and 25, CRP, ACE, KAILEY, DsDNA, RF, SS, ANCA, Lyme, Hep panel, Quant TB, Syphilis, histoplasma ab/antigen, B2 glycoprotein ab, HIV.  CSF 9/15/2023: TNC 18 (lymph pred) MBP 7.4 Glucose 19 Protein 117 Lactate 5.7 [normal 1.1-2.4] ACE 4.6 [ normal 0-2.5] b2 microglobulin 5.1 [normal 0-2.4] PCR neg, WNV neg, VDRL neg Fungitell neg. fungal cx neg Histoplasma neg Culture/gram stain neg No AFB staining done, no AFB on culture to date Cytology/flow: neg cancer cells, scattered lymphoctyes and mononuclear cells (4% B cells without any surface Igs) MOG neg  Labs 5/11/23- Interleukin 2 recep neg, ACE neg, CASSI nl, RF nl, ESR and CRP nl. SSA/B nl. Vitamin D1.25 nl, KAILEY nl, DsDNA nl.

## 2024-03-11 ENCOUNTER — TRANSCRIPTION ENCOUNTER (OUTPATIENT)
Age: 34
End: 2024-03-11

## 2024-03-11 RX ORDER — INFLIXIMAB-DYYB 100 MG/10ML
100 INJECTION, POWDER, LYOPHILIZED, FOR SOLUTION INTRAVENOUS
Qty: 12 | Refills: 0 | Status: ACTIVE | COMMUNITY
Start: 2024-03-11 | End: 1900-01-01

## 2024-03-11 RX ORDER — INFLIXIMAB-DYYB 100 MG/10ML
100 INJECTION, POWDER, LYOPHILIZED, FOR SOLUTION INTRAVENOUS
Qty: 4 | Refills: 0 | Status: ACTIVE | COMMUNITY
Start: 2024-03-11 | End: 1900-01-01

## 2024-03-12 ENCOUNTER — TRANSCRIPTION ENCOUNTER (OUTPATIENT)
Age: 34
End: 2024-03-12

## 2024-03-18 ENCOUNTER — TRANSCRIPTION ENCOUNTER (OUTPATIENT)
Age: 34
End: 2024-03-18

## 2024-03-18 LAB
ALBUMIN SERPL ELPH-MCNC: 4.6 G/DL
ALP BLD-CCNC: 58 U/L
ALT SERPL-CCNC: 34 U/L
ANION GAP SERPL CALC-SCNC: 16 MMOL/L
AST SERPL-CCNC: 35 U/L
BASOPHILS # BLD AUTO: 0.09 K/UL
BASOPHILS NFR BLD AUTO: 0.9 %
BILIRUB SERPL-MCNC: 0.5 MG/DL
BUN SERPL-MCNC: 5 MG/DL
CALCIUM SERPL-MCNC: 9.5 MG/DL
CHLORIDE SERPL-SCNC: 107 MMOL/L
CO2 SERPL-SCNC: 22 MMOL/L
CREAT SERPL-MCNC: 0.68 MG/DL
EGFR: 119 ML/MIN/1.73M2
EOSINOPHIL # BLD AUTO: 0.09 K/UL
EOSINOPHIL NFR BLD AUTO: 0.9 %
GLUCOSE SERPL-MCNC: 80 MG/DL
HBV CORE IGG+IGM SER QL: NONREACTIVE
HBV CORE IGM SER QL: NONREACTIVE
HBV SURFACE AB SER QL: NONREACTIVE
HBV SURFACE AG SER QL: NONREACTIVE
HCT VFR BLD CALC: 42.2 %
HCV AB SER QL: NONREACTIVE
HCV S/CO RATIO: 0.06 S/CO
HGB BLD-MCNC: 14.1 G/DL
HIV1+2 AB SPEC QL IA.RAPID: NONREACTIVE
IMM GRANULOCYTES NFR BLD AUTO: 0.9 %
LYMPHOCYTES # BLD AUTO: 1.7 K/UL
LYMPHOCYTES NFR BLD AUTO: 17.5 %
MAN DIFF?: NORMAL
MCHC RBC-ENTMCNC: 32.3 PG
MCHC RBC-ENTMCNC: 33.4 GM/DL
MCV RBC AUTO: 96.6 FL
MONOCYTES # BLD AUTO: 0.75 K/UL
MONOCYTES NFR BLD AUTO: 7.7 %
NEUTROPHILS # BLD AUTO: 6.98 K/UL
NEUTROPHILS NFR BLD AUTO: 72.1 %
PLATELET # BLD AUTO: 364 K/UL
POTASSIUM SERPL-SCNC: 4.2 MMOL/L
PROT SERPL-MCNC: 6.9 G/DL
RBC # BLD: 4.37 M/UL
RBC # FLD: 14.1 %
SODIUM SERPL-SCNC: 146 MMOL/L
WBC # FLD AUTO: 9.7 K/UL

## 2024-03-20 ENCOUNTER — TRANSCRIPTION ENCOUNTER (OUTPATIENT)
Age: 34
End: 2024-03-20

## 2024-03-20 LAB
M TB IFN-G BLD-IMP: NEGATIVE
QUANTIFERON TB PLUS MITOGEN MINUS NIL: >10 IU/ML
QUANTIFERON TB PLUS NIL: 0.03 IU/ML
QUANTIFERON TB PLUS TB1 MINUS NIL: 0 IU/ML
QUANTIFERON TB PLUS TB2 MINUS NIL: 0 IU/ML

## 2024-03-26 ENCOUNTER — NON-APPOINTMENT (OUTPATIENT)
Age: 34
End: 2024-03-26

## 2024-03-26 ENCOUNTER — TRANSCRIPTION ENCOUNTER (OUTPATIENT)
Age: 34
End: 2024-03-26

## 2024-03-26 RX ORDER — GABAPENTIN 300 MG/1
300 CAPSULE ORAL
Qty: 180 | Refills: 6 | Status: ACTIVE | COMMUNITY
Start: 2023-10-30 | End: 1900-01-01

## 2024-04-04 ENCOUNTER — TRANSCRIPTION ENCOUNTER (OUTPATIENT)
Age: 34
End: 2024-04-04

## 2024-04-09 RX ORDER — INFLIXIMAB-DYYB 100 MG/10ML
100 INJECTION, POWDER, LYOPHILIZED, FOR SOLUTION INTRAVENOUS
Refills: 0 | Status: ACTIVE | OUTPATIENT
Start: 2024-04-09 | End: 1900-01-01

## 2024-04-09 RX ORDER — DIPHENHYDRAMINE HCL 25 MG/1
25 CAPSULE ORAL
Refills: 0 | Status: ACTIVE | OUTPATIENT
Start: 2024-04-09 | End: 1900-01-01

## 2024-04-09 RX ORDER — INFLIXIMAB 100 MG/10ML
100 INJECTION, POWDER, LYOPHILIZED, FOR SOLUTION INTRAVENOUS
Qty: 12 | Refills: 0 | Status: COMPLETED | COMMUNITY
Start: 2024-03-06 | End: 2024-04-09

## 2024-04-09 RX ORDER — INFLIXIMAB 100 MG/10ML
100 INJECTION, POWDER, LYOPHILIZED, FOR SOLUTION INTRAVENOUS
Qty: 4 | Refills: 11 | Status: COMPLETED | COMMUNITY
Start: 2024-03-06 | End: 2024-04-09

## 2024-04-10 ENCOUNTER — APPOINTMENT (OUTPATIENT)
Dept: RHEUMATOLOGY | Facility: CLINIC | Age: 34
End: 2024-04-10
Payer: COMMERCIAL

## 2024-04-10 VITALS
OXYGEN SATURATION: 99 % | DIASTOLIC BLOOD PRESSURE: 79 MMHG | SYSTOLIC BLOOD PRESSURE: 110 MMHG | TEMPERATURE: 97.8 F | HEART RATE: 122 BPM

## 2024-04-10 VITALS — OXYGEN SATURATION: 96 % | HEART RATE: 103 BPM | SYSTOLIC BLOOD PRESSURE: 104 MMHG | DIASTOLIC BLOOD PRESSURE: 72 MMHG

## 2024-04-10 PROCEDURE — 96365 THER/PROPH/DIAG IV INF INIT: CPT

## 2024-04-10 PROCEDURE — 96366 THER/PROPH/DIAG IV INF ADDON: CPT

## 2024-04-11 RX ORDER — INFLIXIMAB-DYYB 100 MG/10ML
100 INJECTION, POWDER, LYOPHILIZED, FOR SOLUTION INTRAVENOUS
Qty: 0 | Refills: 0 | Status: COMPLETED
Start: 2024-04-09

## 2024-04-11 RX ORDER — DIPHENHYDRAMINE HCL 25 MG/1
25 CAPSULE ORAL
Qty: 0 | Refills: 0 | Status: COMPLETED
Start: 2024-04-09

## 2024-04-11 NOTE — HISTORY OF PRESENT ILLNESS
[Denies] : Denies [N/A] : N/A [Yes] : Yes [Informed consent documented in EHR.] : Informed consent documented in EHR. [de-identified] : ambulates with cane [de-identified] : first dose [Left upper extremity] : Left upper extremity [24g] : 24g [Start Time: ___] : Medication Start Time: [unfilled] [End Time: ___] : Medication End Time: [unfilled] [Medication Name: ___] : Medication Name: [unfilled] [Total Amount Administered: ___] : Total Amount Administered: [unfilled] [IV discontinued. Intact. No signs or symptoms of IV complications noted. Time: ___] : IV discontinued. Intact. No signs or symptoms of IV complications noted. Time: [unfilled] [Patient  instructed to seek medical attention with signs and symptoms of adverse effects] : Patient  instructed to seek medical attention with signs and symptoms of adverse effects [Patient left unit in no acute distress] : Patient left unit in no acute distress [Medications administered as ordered and tolerated well.] : Medications administered as ordered and tolerated well. [de-identified] : forearm [de-identified] : Patient presents for scheduled Inflectra infusion, week 0, ambulatory with cane, in NAD. Patient with history of Neurosarcodosis. Today patient reports discomfort in b/l LE's described as tingling, attributed to Neurosarcoidosis. Patient admits to recent fall down steps, attributed slippery surface, patient denies trauma. Falls precautions addressed. Patient otherwise with no other s/s.

## 2024-04-18 ENCOUNTER — NON-APPOINTMENT (OUTPATIENT)
Age: 34
End: 2024-04-18

## 2024-04-25 ENCOUNTER — APPOINTMENT (OUTPATIENT)
Dept: RHEUMATOLOGY | Facility: CLINIC | Age: 34
End: 2024-04-25
Payer: COMMERCIAL

## 2024-04-25 VITALS
OXYGEN SATURATION: 97 % | SYSTOLIC BLOOD PRESSURE: 121 MMHG | DIASTOLIC BLOOD PRESSURE: 83 MMHG | TEMPERATURE: 97.3 F | HEART RATE: 120 BPM | RESPIRATION RATE: 16 BRPM

## 2024-04-25 VITALS — SYSTOLIC BLOOD PRESSURE: 103 MMHG | OXYGEN SATURATION: 99 % | HEART RATE: 96 BPM | DIASTOLIC BLOOD PRESSURE: 70 MMHG

## 2024-04-25 PROCEDURE — 96365 THER/PROPH/DIAG IV INF INIT: CPT

## 2024-04-25 PROCEDURE — 96366 THER/PROPH/DIAG IV INF ADDON: CPT

## 2024-04-25 RX ORDER — DIPHENHYDRAMINE HCL 25 MG/1
25 CAPSULE ORAL
Qty: 0 | Refills: 0 | Status: COMPLETED
Start: 2024-04-09

## 2024-04-25 RX ORDER — INFLIXIMAB-DYYB 100 MG/10ML
100 INJECTION, POWDER, LYOPHILIZED, FOR SOLUTION INTRAVENOUS
Qty: 0 | Refills: 0 | Status: COMPLETED
Start: 2024-04-09

## 2024-05-10 ENCOUNTER — APPOINTMENT (OUTPATIENT)
Dept: NEUROLOGY | Facility: CLINIC | Age: 34
End: 2024-05-10
Payer: COMMERCIAL

## 2024-05-10 VITALS
SYSTOLIC BLOOD PRESSURE: 128 MMHG | DIASTOLIC BLOOD PRESSURE: 83 MMHG | HEIGHT: 61 IN | BODY MASS INDEX: 31.15 KG/M2 | HEART RATE: 87 BPM | WEIGHT: 165 LBS

## 2024-05-10 DIAGNOSIS — G96.198 OTHER DISORDERS OF MENINGES, NOT ELSEWHERE CLASSIFIED: ICD-10-CM

## 2024-05-10 PROCEDURE — 99215 OFFICE O/P EST HI 40 MIN: CPT

## 2024-05-13 ENCOUNTER — APPOINTMENT (OUTPATIENT)
Dept: OTOLARYNGOLOGY | Facility: CLINIC | Age: 34
End: 2024-05-13
Payer: COMMERCIAL

## 2024-05-13 PROCEDURE — 31575 DIAGNOSTIC LARYNGOSCOPY: CPT

## 2024-05-13 PROCEDURE — 99214 OFFICE O/P EST MOD 30 MIN: CPT | Mod: 25

## 2024-05-16 ENCOUNTER — TRANSCRIPTION ENCOUNTER (OUTPATIENT)
Age: 34
End: 2024-05-16

## 2024-05-16 NOTE — ASSESSMENT
[FreeTextEntry1] : 33 year old with PMHx of leptomeningeal disease is referred by  for follow up of cervical lymphadenopathy. On recent PET scan she was found to have Hypermetabolic right cervical nodes. She is s/p core biopsy of level 1 right lymph node 1/17/24 She is here to follow up for left vocal cord immobility seen on fiberoptic scope at last office visit.  -1.1 cm right level 1b FGD avid persistent lymph node core biopsy pathology consistent with  Granulomatous inflammation with fibrosis.--Discussed with Dr Aguilar, no need for excisional bx at this time. -Fiberoptic exam showed, limited left vocal cord mobility, slightly improved since last visit, unclear etiology, no compressive mass lesion seen along the course of RLN, may be autoimmune or idiopathic (post-viral) in nature. No indication medialization at this time given no clinical aspiration or major vocal deficits.  -Follow up with Neurologist, Dr. Krishnan,  -Follow up as needed  -They are in agreement with this plan

## 2024-05-16 NOTE — HISTORY OF PRESENT ILLNESS
[de-identified] : 33 year old with PMHx of leptomeningeal disease is referred by  for follow up of cervical lymphadenopathy. On recent PET scan she was found to have Hypermetabolic right cervical nodes. She is s/p core biopsy of level 1 right lymph node 1/17/24 She is here to follow up for left vocal cord immobility seen on fiberoptic scope at last office visit.  Currently taking prednisone 20mg and pantoprazole daily, soon will decreasing to 15mg Qd Report saw neurologist Friday 5/10 and was found to have thrush, started on Nystatin  Reports occasional fatigue and facial bloating. She believes it is due to prednisone.  Patient denies dysphagia, odynophagia, dysphonia, cough, neck swelling, neck pain, throat pain or recent throat infections.  No new lumps or bumps noted.  Weight remains stable.    1/17/24: Core biopsy: 	 Dominik Accession Number : [id number] Patient:     FELIPA WRIGHT Accession:                             [id number] Collected Date/Time:                   1/17/2024 15:45 EST Received Date/Time:                    1/17/2024 21:09 EST Fine Needle Aspiration Report - Auth (Verified)  Specimen(s) Submitted LYMPH NODE, LEVEL 1, RIGHT, US GUIDED CORE BIOPSY AND FNA Final Diagnosis Flow Cytometry (01-OG-): 1.  Lymph node, level 1, right, US-guided core biopsy and FNA: Granulomatous inflammation with fibrosis.  See note and description. Diagnostic note: The patient's prior biopsy on the same site showed necrotizing granulomas ([id number], 8/2/2023).  In conjunction with the previous findings, the current findings are consistent with persistent granulomatous inflammation. The presence of fibrosis without definitive necrosis suggests chronic or resolving disease, or may represent post-treatment effect.  Clinical correlation is recommended. There is no evidence of lymphoma or other malignancies. If clinically indicated, an excisional biopsy may be considered for further diagnostic evaluation.  Microscopic description: The cytology smears display a population of scattered lymphocytes, few lymphoid tangles in a background of blood. No Shiv-Nusrat cells or epithelial cells present. The cell block is non-contributory.  The core biopsy shows small fragments of soft tissue consisting of fibrosis, small lymphoid aggregates of small round lymphocytes, and clusters of epithelioid histiocytes consistent with granulomas. Necrosis is not conspicuous.  Atypical cells or viral inclusions are not noted.  AFB is negative for acid-fast bacilli.  GMS is negative for fungal organisms _ Immunohistochemistry: Immunostains for the following markers were performed on block 1B: CD3, CD5, CD20, PAX5, CD10, BCL6, BCL2, Cyclin D1, Ki67, MUM1  The lymphocytes are comprised mostly of CD3+CD5+ T-cells admixed with a smaller number of PAX5+CD20+ B-cells, BCL6 and CD10 do not identify germinal centers, and are negative in the lymphocytes. BCL2 highlights the lymphocytes. Cyclin D1 is negative in the lymphocytes. MUM1 is positive in scattered cells.  Ki-67 proliferation index is low (<5%). _ Flow cytometry analysis ([id number]): The lymphocyte immunophenotypic findings show no diagnostic abnormalities.   Lymphoma work-up Slide(s) with built in immunohistochemical study control(s) associated and negative control with this case has/have been verified by the sign out pathologist.  For slide(s) without built in controls positive control slides has/have been reviewed and approved by immunohistochemistry lab These immunohistochemical/ in-situ hybridization tests have been developed and their performance characteristics determined by [facility], Department of Pathology, Division of Immunopathology, [address] [place] [address].  It has not been cleared or approved by the U.S. Food and Drug Administration. The FDA has determined that such clearance or approval is not necessary. This test is used for clinical purposes.  The laboratory is certified under the CLIA-88 as qualified to perform high complexity clinical testing.  12/4/23: PET:  HEAD/NECK: Physiologic FDG activity in visualized brain, head,and neck. -Leptomeninges and nerves are are below resolution of PET/CT and basal ganglia is not well evaluated on FDG PET/CT due to intense physiologic brain activity. Continued evaluation of the brain with MRI, the imaging procedure of choice, recommended. -Hypermetabolic right cervical nodes are again noted similar to prior examination; for example right level IB (image 71) stable at 1.1 x 0.5 cm with SUV 9.3 (previously SUV 5.0); referenced right level III node (image 74) 1.4 x 0.7 cm within SUV 6.3 (previously 1.3 x 0.8 cm with SUV 5.4). Right level IV (image 83 also stable at 0.6 x 0.4 cm with SUV 3.7 (previously 2.4). No new FDG avid node. If not already performed, sydnee biopsy  IMPRESSION: No interval changes since the prior PET/CT. 1. Hypermetabolic right cervical nodes similar to prior examination, no new FDG avid nodes. 2. Limited evaluation of the leptomeninges and brain on PET/CT; MRI is the imaging procedure of choice for evaluation of the brain. 3. No focal abnormal activity in the remaining field of view that is suggestive of focal infection or inflammation or FDG avid malignancy. --- End of Report ---

## 2024-05-16 NOTE — PHYSICAL EXAM
[Midline] : trachea located in midline position [Normal] : no rashes [de-identified] : Thrush noted

## 2024-05-21 ENCOUNTER — APPOINTMENT (OUTPATIENT)
Dept: RHEUMATOLOGY | Facility: CLINIC | Age: 34
End: 2024-05-21
Payer: COMMERCIAL

## 2024-05-21 VITALS
DIASTOLIC BLOOD PRESSURE: 71 MMHG | SYSTOLIC BLOOD PRESSURE: 105 MMHG | OXYGEN SATURATION: 97 % | RESPIRATION RATE: 16 BRPM | HEART RATE: 102 BPM

## 2024-05-21 VITALS
HEART RATE: 108 BPM | SYSTOLIC BLOOD PRESSURE: 112 MMHG | DIASTOLIC BLOOD PRESSURE: 72 MMHG | TEMPERATURE: 97.9 F | OXYGEN SATURATION: 100 % | RESPIRATION RATE: 16 BRPM

## 2024-05-21 PROCEDURE — 96365 THER/PROPH/DIAG IV INF INIT: CPT

## 2024-05-21 PROCEDURE — 96366 THER/PROPH/DIAG IV INF ADDON: CPT

## 2024-05-21 RX ORDER — INFLIXIMAB-DYYB 100 MG/10ML
100 INJECTION, POWDER, LYOPHILIZED, FOR SOLUTION INTRAVENOUS
Qty: 0 | Refills: 0 | Status: COMPLETED
Start: 2024-04-09

## 2024-05-21 RX ORDER — DIPHENHYDRAMINE HCL 25 MG/1
25 CAPSULE ORAL
Qty: 0 | Refills: 0 | Status: COMPLETED
Start: 2024-04-09

## 2024-05-21 NOTE — HISTORY OF PRESENT ILLNESS
[Denies] : Denies [No] : No [Yes] : Yes [Declined] : Declined [Left upper extremity] : Left upper extremity [24g] : 24g [Start Time: ___] : Medication Start Time: [unfilled] [End Time: ___] : Medication End Time: [unfilled] [Medication Name: ___] : Medication Name: [unfilled] [Total Amount Administered: ___] : Total Amount Administered: [unfilled] [IV discontinued. Intact. No signs or symptoms of IV complications noted. Time: ___] : IV discontinued. Intact. No signs or symptoms of IV complications noted. Time: [unfilled] [Patient  instructed to seek medical attention with signs and symptoms of adverse effects] : Patient  instructed to seek medical attention with signs and symptoms of adverse effects [Patient left unit in no acute distress] : Patient left unit in no acute distress [Medications administered as ordered and tolerated well.] : Medications administered as ordered and tolerated well. [de-identified] : median cubital vein  [de-identified] : Patient presents for week 6 Inflectra infusion, doing well overall. Patient denies any recent infection, antibiotic use or hospitalizations. Patient reports tolerating last infusion well. Patient continues to report tingling to the feet and fatigue x 1 day post infusion. No other symptoms or concerns noted at this time. Patient pre-medicated and tolerated infusion well.

## 2024-05-24 NOTE — PHYSICAL EXAM
[FreeTextEntry1] : PHYSICAL EXAM Constitutional: Alert, no acute distress  Psychiatric: appropriate affect and mood Pulmonary: No respiratory distress, stable on room air  NEUROLOGICAL EXAM Mental status: The patient is alert, attentive and conversational memory intact. Speech/language: No dysarthria Cranial nerves: CN II: Visual fields are full to confrontation. R pupil 8.5 mm and left pupil 9 mm and both reactive.  CN III, IV, VI: EOMI, no nystagmus, no ptosis CN V: Facial sensation is intact to pinprick in all 3 divisions bilaterally. CN VII: Face is symmetric with normal eye closure and smile. CN VII: Hearing is normal to rubbing fingers CN IX, X: Palate elevates symmetrically.  CN XI: Head turning and shoulder shrug are intact CN XII: Tongue is midline with normal movements and no atrophy. Motor: Strength is full bilaterally. 5/5 muscle power in bilateral UE and LE. Reflexes: R        L  Biceps    2+       2+  Patellar   1+       0  Achilles   0       0 Plantar responses- R down, L down Sensory:      RUE/RLE         LUE/LLE light touch       +/+                     +/+ Pinprick           + /+                     +/+ Coordination/Cerebellar: There is no dysmetria on finger-to-nose and heel to shin.  Gait/Stance: Wide based gait, mild ataxia. Can walk without a cane. Tandem gait unsteady. Romberg with mild sway.

## 2024-05-24 NOTE — DATA REVIEWED
[de-identified] :  MRA head and neck 1/2/2024-unremarkable.    MR brain w/w/o 12/28/2023- no change since 10/12/2023, reidentification of dominantly basilar leptomeningeal enhancement. Stable abnormal signal in R>L basal ganglia. No acute stroke or hemorrhage.  MR C spine w/w/o 12/28/2023- stable compared to 2/20/2023 and 8/2023.  MRA head and neck 1/2/2024-unremarkable.    Repeat PET scan 11/16/2023- stable ( persistent hypermetabolic R cervical lymph nodes)  MR spect 10/12/2023 normal. Repeat MRI brain w/w/o 10/12/2023 with resolution of enhancement in R BG and possibly mild improvement in basilar leptomeningeal enhancement. Signal changes in central satinder and L BG seem more prominent on current scan. no new lesions/enhancement.    MRI brain, cervical and thoracic spine w/w/o 8/15/2023- C and T spine w/ similar extensive cord surface leptomeningeal enhancement compared w/ 2/2023. 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   CT chest/abd/pelvis 5/11/23- neg for malignancy/lymphadenopathy MRI brain and orbits w/w/o 5/11/2023- Stable diffuse leptomeningeal enh.  FDG PET scan 7/3/2023- Few FDG-avid right cervical lymph nodes (needle biopsy recommended)   Imaging at [facility] - - CTA H/N and CTV head 2/18/2023- no vascular stenosis, dissection or aneurysm. No dural venous sinus thrombosis.  MRI brain and C/T spine w/w/o contrast 2/20/2023- Diffuse leptomeningeal disease of brain and spinal cord, scattered nodular enhancement pattern in brain and cervicomedullary junction, involvement of optic chiasm, b/l prox ON, satinder/medulla/cerebellum with b/l cranial nerves involved. A few punctuate subcortical WM lesions. .    [de-identified] :   serum labs 9/6/2023:- ESR 26. C3 164 (normal ) Leukocytosis has improved, 19 K (6/2023) and now 11k. ANC 16K and now 8k. Normal labs: C4, Ig4, SPEP, Vitamin D 1,25 and 25, CRP, ACE, KAILEY, DsDNA, RF, SS, ANCA, Lyme, Hep panel, Quant TB, Syphilis, histoplasma ab/antigen, B2 glycoprotein ab, HIV.  CSF 9/15/2023: TNC 18 (lymph pred) MBP 7.4 Glucose 19 Protein 117 Lactate 5.7 [normal 1.1-2.4] ACE 4.6 [ normal 0-2.5] b2 microglobulin 5.1 [normal 0-2.4] PCR neg, WNV neg, VDRL neg Fungitell neg. fungal cx neg Histoplasma neg Culture/gram stain neg No AFB staining done, no AFB on culture to date Cytology/flow: neg cancer cells, scattered lymphoctyes and mononuclear cells (4% B cells without any surface Igs) MOG neg  Labs 5/11/23- Interleukin 2 recep neg, ACE neg, CASSI nl, RF nl, ESR and CRP nl. SSA/B nl. Vitamin D1.25 nl, KAILEY nl, DsDNA nl.

## 2024-05-24 NOTE — HISTORY OF PRESENT ILLNESS
[FreeTextEntry1] : INTERIM HX 05/10/2024: Started inflectra infusions 4/10, next dose 5/21. Pt stable. She has good days and bad days. Continues to work full time. Gait/balance has improved- mom agrees with this. No brand new symptoms. She denies any headaches. Gabapentin helps paresthesia's in LE.   INTERIM HX 02/13/2024: [This is telehealth 2 -way video visit]  Pt home for snow day. She reports more bad days than good days. Neuropathic pain has been more bothersome, she is taking gabapentin 300 mg TID.  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 No evidence of lymphoma or malignancy. AFB negative for acid fast bacilli, GMS negative for fungal organisms.   MR brain w/w/o 12/28/2023- no change since 10/12/2023, reidentification of dominantly basilar leptomeningeal enhancement. Stable abnormal signal in R>L basal ganglia. No acute stroke or hemorrhage.  MR C spine w/w/o 12/28/2023- stable compared to 2/20/2023 and 8/2023.  MRA head and neck 1/2/2024-unremarkable.   EEG 12/27/2023- normal    INTERIM HX 12/19/2023: Repeat PET scan 11/16/2023- stable (persistent hypermetabolic R cervical lymph nodes). Referred to Head/neck surgery for excisional biopsy to get more tissue sample to rule out infection/malignancy. Being discharged from OT, continuing PT- working on balance. 4-6 episodes of right sided weakness (RUE /RLE) that last < 20 min, in the last 2 weeks. She has been under more stress at work. Tingling in legs a little more bothersome. Walking better.   INTERIM HX 10/30/2023: Clinically doing well, PT has rly helped her. Feels more confident walking without a cane now. Balance better. Denies headaches, dizziness less often. No diplopia. Diagnosed with early glaucoma.   Seen by NSGY for potential brain biopsy, deferred to due resolution of enhancement of R BG lesion and high risk procedure. Pt remains on prednisone 20 mg QD. MR spect 10/12/2023 normal. Repeat MRI brain w/w/o 10/12/2023 with resolution of enhancement in R BG and possibly mild improvement in basilar leptomeningeal enhancement. Signal changes in central satinder and L BG seem more prominent on current scan. no new lesions/enhancement.   serum labs 9/6/2023:- ESR 26. C3 164 (normal ) Leukocytosis has improved, 19 K (6/2023) and now 11k. ANC 16K and now 8k. Normal labs: C4, Ig4, SPEP, Vitamin D 1,25 and 25, CRP, ACE, KAILEY, DsDNA, RF, SS, ANCA, Lyme, Hep panel, Quant TB, Syphilis, histoplasma ab/antigen, B2 glycoprotein ab, HIV.  CSF 9/15/2023: TNC 18 (lymph pred) MBP 7.4 Glucose 19 Protein 117 Lactate 5.7 [normal 1.1-2.4] ACE 4.6 [ normal 0-2.5] b2 microglobulin 5.1 [normal 0-2.4] PCR neg, WNV neg, VDRL neg Fungitell neg. fungal cx neg Histoplasma neg Culture/gram stain neg No AFB staining done, no AFB on culture to date Cytology/flow: neg cancer cells, scattered lymphoctyes and mononuclear cells (4% B cells without any surface Igs) MOG neg  INTERIM HX 08/25/2023: Here to review results of cervical LN biopsy results and MRI results. cervical LN biopsy- c/w necrotizing granulomas, negative for malignant cells, no organisms on special stains (AFB, GMS). MRI brain, cervical and thoracic spine w/w/o 8/15/2023- C and T spine w/ similar extensive cord surface leptomeningeal enhancement compared w/ 2/2023. 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   Pt remains on prednisone 20 mg QD. Reports more good days lately. Did have an episode 2 weeks ago, she was out in the heat and came back in and felt very dizzy and body felt weak, could not keep herself up. No LOC or seizure likely activity.  Overall, she reports headaches have resolved, diplopia resolved, vision loss improved. Dizziness is on and off. GAit is still not normal, but balance a little better. In PT and OT. Tingling in extremities is constant. Joint pains is stable and intermittent.  She believes steroids has helped with clinical symptoms some, and did note a little worsening at times of tapering down dosage.  INTERIM HX 07/24/2023: After our last visit, pt completed several studies (detailed below), also completed 5 days IV solumedrol 4/28, 5/1-5/4, now on prednisone taper (currently at 20 mg QD) Labs 5/11/23- Interleukin 2 recep neg, ACE neg, CASSI nl, RF nl, ESR and CRP nl. SSA/B nl. Vitamin D1.25 nl, KAILEY nl, DsDNA nl. CT chest/abd/pelvis 5/11/23- neg for malignancy/lymphadenopathy MRI brain and orbits w/w/o 5/11/2023- Stable diffuse leptomeningeal enh.  FDG PET scan 7/3/2023- Few FDG-avid right cervical lymph nodes (needle biopsy recommended)  Seen by neuro oph (Dr Chawla) 7/10- VA 20/40 OD and 20/30 OS, trace APD on the left. superior arcuate VF defect OS > OS.  Never did PT. On prednisone 20 mg QD for the past 2 weeks. Having more bad days then good days- muscle fatigue at end of day, constant tingling throughout the body- maybe due to her starting work. Uses a cane. No longer has diplopia. Occasional vertigo. No brand new symptoms. "Good days are very good".  --------------------------------------------------------------------------------------------------------------------------------------- HPI (4/20/2023)- 32 year old RH female, previously healthy, developed new onset intermittent bifrontal HA, dizziness/vertigo, gait imbalance and diplopia symptoms beginning 2/2023. She was seen by Dr. Estephania James (neuro) 2/13 for her symptoms and MRI's were recommended. However, given new onset b/l LE weakness/numbness/tingling, she presented to [facility] on 2/18, where she was admitted and underwent work up- CT head, CTA/CTV head, MRI of neuroaxis and spinal tap, with work up revealing diffuse leptomeningeal enhancement of brain and spinal cord, and spinal tap showing 34 TNC (85% lymph), glucose 16 (low), protein 207, MBP 13.7, matched and unique OCB (5), MOG neg, enceph panel neg, Culture/gram stain/AFB/PCR neg, ACE 7.1 (normal 0-2.5), flow cytometry with increased small lymph. Serum studies ( Quant TB, KAILEY, Syphilis, ACE negative), ESR 16. She was seen by ID and Neuro. Recommended outpatient follow up. No steroids given in hospital. She left for DR x 10 days (vacation), 1 week after hospital discharge.    Imaging at [facility] - - CTA H/N and CTV head 2/18/2023- no vascular stenosis, dissection or aneurysm. No dural venous sinus thrombosis.  MRI brain and C/T spine w/w/o contrast 2/20/2023- Diffuse leptomeningeal disease of brain and spinal cord, scattered nodular enhancement pattern in brain and cervicomedullary junction, involvement of optic chiasm, b/l prox ON, satinder/medulla/cerebellum with b/l cranial nerves involved. A few punctuate subcortical WM lesions.  Interim Hx- -  She continues to be symptomatic. HA and diplopia resolved. Experiences intermittent "dimming" of vision peripherally b/l x 1 week. Sees ophthalmologist (Dr. 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  No pain with eye movements. No vertigo, but has imbalance (not better, more frequent). Intermittent tingling over arms and legs- more frequent. Weakness in legs.   She is from [place] [employer] in [place].

## 2024-05-24 NOTE — ASSESSMENT
[FreeTextEntry1] : Assessment/Plan: 32 yo female w/ diffuse leptomeningeal enhancement (predominately basilar involvement) with involvement of brain parenchyma (b/l basal ganglia)- dx'd 2/2023. She has been on steroid treatment since April/May 2023 with some clinical improvement, however MR imaging showed slight progression in leptomeningeal and parenchymal disease despite high dose steroids. A cervical LN biopsy revelated "necrotizing granuloma", however work up for malignancy, infection (TB, fungal) and vasculitis has been negative to date. 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 ? sarcoid with necrotizing granulomatous pattern.  Given persistent leptomeningeal enhancement despite high dose and prolonged prednisone treatment, Pt was initiated on Infliximab  Plan:- [] Continue Inflectra [id number] infusions. (first dose 4/10/2024) [] Continue prednisone > decrease to 15 mg QD [] Continue pantoprazole 40 mg QD [] Will repeat MR imaging of neuroaxis w/w/o contrast in 9/2024.  [] Continue gabapentin. [] Nystatin mouth wash prescribed for thrush [] Initiate PT for strengthening and balance training.  RTC 1 month  The above plan was discussed with FELIPA WRIGHT in great detail. FELIPA WRIGHT verbalized understanding and agrees with plan as detailed above. Patient was provided education and counselling on current diagnosis/symptoms. She was advised to call our clinic at [phone] for any new or worsening symptoms, or with any questions or concerns. FELIPA WRIGHT expressed understanding and all her questions/concerns were addressed.  Irma Krishnan M.D.

## 2024-06-14 ENCOUNTER — APPOINTMENT (OUTPATIENT)
Dept: NEUROLOGY | Facility: CLINIC | Age: 34
End: 2024-06-14
Payer: COMMERCIAL

## 2024-06-14 VITALS
WEIGHT: 162 LBS | HEIGHT: 61 IN | HEART RATE: 95 BPM | DIASTOLIC BLOOD PRESSURE: 73 MMHG | SYSTOLIC BLOOD PRESSURE: 113 MMHG | BODY MASS INDEX: 30.58 KG/M2

## 2024-06-14 DIAGNOSIS — D86.89 SARCOIDOSIS OF OTHER SITES: ICD-10-CM

## 2024-06-14 DIAGNOSIS — R39.15 URGENCY OF URINATION: ICD-10-CM

## 2024-06-14 PROCEDURE — 99214 OFFICE O/P EST MOD 30 MIN: CPT

## 2024-06-14 PROCEDURE — G2211 COMPLEX E/M VISIT ADD ON: CPT

## 2024-06-14 RX ORDER — NYSTATIN 100000 [USP'U]/ML
100000 SUSPENSION ORAL 3 TIMES DAILY
Qty: 150 | Refills: 6 | Status: ACTIVE | COMMUNITY
Start: 2023-12-27 | End: 1900-01-01

## 2024-06-14 RX ORDER — PANTOPRAZOLE 40 MG/1
40 TABLET, DELAYED RELEASE ORAL
Qty: 30 | Refills: 11 | Status: ACTIVE | COMMUNITY
Start: 2023-05-04 | End: 1900-01-01

## 2024-06-14 RX ORDER — PREDNISONE 10 MG/1
10 TABLET ORAL
Qty: 30 | Refills: 3 | Status: ACTIVE | COMMUNITY
Start: 2023-05-04 | End: 1900-01-01

## 2024-06-14 NOTE — HISTORY OF PRESENT ILLNESS
[FreeTextEntry1] : INTERIM HX 06/14/2024: last infusion 5/21. Thrush better. Paresthesia's limited in feet, on gabapentin. On prednisone 15 mg QD. Doing "pretty okay". Increased frequency and urgency, no change in smell, no burning. Does drink a lot of water. Diarrhea incontinence once a month since April.   INTERIM HX 05/10/2024: Started inflectra infusions 4/10, next dose 5/21. Pt stable. She has good days and bad days. Continues to work full time. Gait/balance has improved- mom agrees with this. No brand new symptoms. She denies any headaches. Gabapentin helps paresthesia's in LE.   INTERIM HX 02/13/2024: [This is telehealth 2 -way video visit]  Pt home for snow day. She reports more bad days than good days. Neuropathic pain has been more bothersome, she is taking gabapentin 300 mg TID.  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 No evidence of lymphoma or malignancy. AFB negative for acid fast bacilli, GMS negative for fungal organisms.   MR brain w/w/o 12/28/2023- no change since 10/12/2023, reidentification of dominantly basilar leptomeningeal enhancement. Stable abnormal signal in R>L basal ganglia. No acute stroke or hemorrhage.  MR C spine w/w/o 12/28/2023- stable compared to 2/20/2023 and 8/2023.  MRA head and neck 1/2/2024-unremarkable.   EEG 12/27/2023- normal    INTERIM HX 12/19/2023: Repeat PET scan 11/16/2023- stable (persistent hypermetabolic R cervical lymph nodes). Referred to Head/neck surgery for excisional biopsy to get more tissue sample to rule out infection/malignancy. Being discharged from OT, continuing PT- working on balance. 4-6 episodes of right sided weakness (RUE /RLE) that last < 20 min, in the last 2 weeks. She has been under more stress at work. Tingling in legs a little more bothersome. Walking better.   INTERIM HX 10/30/2023: Clinically doing well, PT has rly helped her. Feels more confident walking without a cane now. Balance better. Denies headaches, dizziness less often. No diplopia. Diagnosed with early glaucoma.   Seen by NSGY for potential brain biopsy, deferred to due resolution of enhancement of R BG lesion and high risk procedure. Pt remains on prednisone 20 mg QD. MR spect 10/12/2023 normal. Repeat MRI brain w/w/o 10/12/2023 with resolution of enhancement in R BG and possibly mild improvement in basilar leptomeningeal enhancement. Signal changes in central satinder and L BG seem more prominent on current scan. no new lesions/enhancement.   serum labs 9/6/2023:- ESR 26. C3 164 (normal ) Leukocytosis has improved, 19 K (6/2023) and now 11k. ANC 16K and now 8k. Normal labs: C4, Ig4, SPEP, Vitamin D 1,25 and 25, CRP, ACE, KAILEY, DsDNA, RF, SS, ANCA, Lyme, Hep panel, Quant TB, Syphilis, histoplasma ab/antigen, B2 glycoprotein ab, HIV.  CSF 9/15/2023: TNC 18 (lymph pred) MBP 7.4 Glucose 19 Protein 117 Lactate 5.7 [normal 1.1-2.4] ACE 4.6 [ normal 0-2.5] b2 microglobulin 5.1 [normal 0-2.4] PCR neg, WNV neg, VDRL neg Fungitell neg. fungal cx neg Histoplasma neg Culture/gram stain neg No AFB staining done, no AFB on culture to date Cytology/flow: neg cancer cells, scattered lymphoctyes and mononuclear cells (4% B cells without any surface Igs) MOG neg  INTERIM HX 08/25/2023: Here to review results of cervical LN biopsy results and MRI results. cervical LN biopsy- c/w necrotizing granulomas, negative for malignant cells, no organisms on special stains (AFB, GMS). MRI brain, cervical and thoracic spine w/w/o 8/15/2023- C and T spine w/ similar extensive cord surface leptomeningeal enhancement compared w/ 2/2023. 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   Pt remains on prednisone 20 mg QD. Reports more good days lately. Did have an episode 2 weeks ago, she was out in the heat and came back in and felt very dizzy and body felt weak, could not keep herself up. No LOC or seizure likely activity.  Overall, she reports headaches have resolved, diplopia resolved, vision loss improved. Dizziness is on and off. GAit is still not normal, but balance a little better. In PT and OT. Tingling in extremities is constant. Joint pains is stable and intermittent.  She believes steroids has helped with clinical symptoms some, and did note a little worsening at times of tapering down dosage.  INTERIM HX 07/24/2023: After our last visit, pt completed several studies (detailed below), also completed 5 days IV solumedrol 4/28, 5/1-5/4, now on prednisone taper (currently at 20 mg QD) Labs 5/11/23- Interleukin 2 recep neg, ACE neg, CASSI nl, RF nl, ESR and CRP nl. SSA/B nl. Vitamin D1.25 nl, KAILEY nl, DsDNA nl. CT chest/abd/pelvis 5/11/23- neg for malignancy/lymphadenopathy MRI brain and orbits w/w/o 5/11/2023- Stable diffuse leptomeningeal enh.  FDG PET scan 7/3/2023- Few FDG-avid right cervical lymph nodes (needle biopsy recommended)  Seen by neuro oph (Dr Chawla) 7/10- VA 20/40 OD and 20/30 OS, trace APD on the left. superior arcuate VF defect OS > OS.  Never did PT. On prednisone 20 mg QD for the past 2 weeks. Having more bad days then good days- muscle fatigue at end of day, constant tingling throughout the body- maybe due to her starting work. Uses a cane. No longer has diplopia. Occasional vertigo. No brand new symptoms. "Good days are very good".  --------------------------------------------------------------------------------------------------------------------------------------- HPI (4/20/2023)- 32 year old RH female, previously healthy, developed new onset intermittent bifrontal HA, dizziness/vertigo, gait imbalance and diplopia symptoms beginning 2/2023. She was seen by Dr. Estephania James (neuro) 2/13 for her symptoms and MRI's were recommended. However, given new onset b/l LE weakness/numbness/tingling, she presented to [facility] on 2/18, where she was admitted and underwent work up- CT head, CTA/CTV head, MRI of neuroaxis and spinal tap, with work up revealing diffuse leptomeningeal enhancement of brain and spinal cord, and spinal tap showing 34 TNC (85% lymph), glucose 16 (low), protein 207, MBP 13.7, matched and unique OCB (5), MOG neg, enceph panel neg, Culture/gram stain/AFB/PCR neg, ACE 7.1 (normal 0-2.5), flow cytometry with increased small lymph. Serum studies ( Quant TB, KAILEY, Syphilis, ACE negative), ESR 16. She was seen by ID and Neuro. Recommended outpatient follow up. No steroids given in hospital. She left for DR x 10 days (vacation), 1 week after hospital discharge.    Imaging at [facility] - - CTA H/N and CTV head 2/18/2023- no vascular stenosis, dissection or aneurysm. No dural venous sinus thrombosis.  MRI brain and C/T spine w/w/o contrast 2/20/2023- Diffuse leptomeningeal disease of brain and spinal cord, scattered nodular enhancement pattern in brain and cervicomedullary junction, involvement of optic chiasm, b/l prox ON, satinder/medulla/cerebellum with b/l cranial nerves involved. A few punctuate subcortical WM lesions.  Interim Hx- -  She continues to be symptomatic. HA and diplopia resolved. Experiences intermittent "dimming" of vision peripherally b/l x 1 week. Sees ophthalmologist (Dr. 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  No pain with eye movements. No vertigo, but has imbalance (not better, more frequent). Intermittent tingling over arms and legs- more frequent. Weakness in legs.   She is from . Works for [employer] in [place].

## 2024-06-14 NOTE — PHYSICAL EXAM
[FreeTextEntry1] : PHYSICAL EXAM Constitutional: Alert, no acute distress Psychiatric: appropriate affect and mood Pulmonary: No respiratory distress, stable on room air  NEUROLOGICAL EXAM Mental status: The patient is alert, attentive and conversational memory intact. Speech/language: No dysarthria Cranial nerves: CN II: Visual fields are full to confrontation. R pupil 8.5 mm and left pupil 9 mm and both reactive. VA 20/30 OD and 20/25 OS CN III, IV, VI: EOMI, no nystagmus, no ptosis CN V: Facial sensation is intact to pinprick in all 3 divisions bilaterally. CN VII: Face is symmetric with normal eye closure and smile. CN VII: Hearing is normal to rubbing fingers CN IX, X: Palate elevates symmetrically. CN XI: Head turning and shoulder shrug are intact CN XII: Tongue is midline with normal movements and no atrophy. Motor: Strength is full bilaterally. 5/5 muscle power in bilateral UE and LE. Reflexes: R L  Biceps 2+ 2+  Patellar 1+ 0  Achilles 0 0 Plantar responses- R down, L down Sensory: RUE/RLE LUE/LLE light touch +/+ +/+ Pinprick + /+ +/+ Coordination/Cerebellar: There is no dysmetria on finger-to-nose and heel to shin. Gait/Stance: Wide based gait, mild ataxia. Can walk without a cane. Tandem gait unsteady. Romberg with mild sway.

## 2024-06-14 NOTE — ASSESSMENT
[FreeTextEntry1] : Assessment/Plan: 34 yo female w/ diffuse leptomeningeal enhancement (predominately basilar involvement) with involvement of brain parenchyma (b/l basal ganglia)- dx'd 2/2023. She has been on steroid treatment since April/May 2023 with some clinical improvement, however MR imaging showed slight progression in leptomeningeal and parenchymal disease despite high dose steroids. A cervical LN biopsy revelated "necrotizing granuloma", however work up for malignancy, infection (TB, fungal) and vasculitis has been negative to date. 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 ? sarcoid with necrotizing granulomatous pattern.  Given persistent leptomeningeal enhancement despite high dose and prolonged prednisone treatment, Pt was initiated on Infliximab.  She remains clinically stable.   Plan:- [] Continue Inflectra [id number] infusions. (first dose 4/10/2024) [] Continue prednisone > decrease to 10 mg QD [] Continue pantoprazole 40 mg QD [] Will repeat MR imaging of neuroaxis w/w/o contrast in 9/2024. [] Continue gabapentin, 600 mg TID [] Nystatin mouth wash prescribed for thrush [] Increased urinary frequency and urgency- order UA/culture. Will refer to urologist.  [] Initiate PT for strengthening and balance training.  RTC 6 weeks  The above plan was discussed with FELIPA WRIGHT in great detail. FELIPA WRIGHT verbalized understanding and agrees with plan as detailed above. Patient was provided education and counselling on current diagnosis/symptoms. She was advised to call our clinic at [phone] for any new or worsening symptoms, or with any questions or concerns. FELIPA WRIGHT expressed understanding and all her questions/concerns were addressed.  Irma Krishnan M.D.

## 2024-06-14 NOTE — DATA REVIEWED
[de-identified] :  MRA head and neck 1/2/2024-unremarkable.    MR brain w/w/o 12/28/2023- no change since 10/12/2023, reidentification of dominantly basilar leptomeningeal enhancement. Stable abnormal signal in R>L basal ganglia. No acute stroke or hemorrhage.  MR C spine w/w/o 12/28/2023- stable compared to 2/20/2023 and 8/2023.  MRA head and neck 1/2/2024-unremarkable.    Repeat PET scan 11/16/2023- stable ( persistent hypermetabolic R cervical lymph nodes)  MR spect 10/12/2023 normal. Repeat MRI brain w/w/o 10/12/2023 with resolution of enhancement in R BG and possibly mild improvement in basilar leptomeningeal enhancement. Signal changes in central satinder and L BG seem more prominent on current scan. no new lesions/enhancement.    MRI brain, cervical and thoracic spine w/w/o 8/15/2023- C and T spine w/ similar extensive cord surface leptomeningeal enhancement compared w/ 2/2023. 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   CT chest/abd/pelvis 5/11/23- neg for malignancy/lymphadenopathy MRI brain and orbits w/w/o 5/11/2023- Stable diffuse leptomeningeal enh.  FDG PET scan 7/3/2023- Few FDG-avid right cervical lymph nodes (needle biopsy recommended)   Imaging at [facility] - - CTA H/N and CTV head 2/18/2023- no vascular stenosis, dissection or aneurysm. No dural venous sinus thrombosis.  MRI brain and C/T spine w/w/o contrast 2/20/2023- Diffuse leptomeningeal disease of brain and spinal cord, scattered nodular enhancement pattern in brain and cervicomedullary junction, involvement of optic chiasm, b/l prox ON, satinder/medulla/cerebellum with b/l cranial nerves involved. A few punctuate subcortical WM lesions. .    [de-identified] :   serum labs 9/6/2023:- ESR 26. C3 164 (normal ) Leukocytosis has improved, 19 K (6/2023) and now 11k. ANC 16K and now 8k. Normal labs: C4, Ig4, SPEP, Vitamin D 1,25 and 25, CRP, ACE, KAILEY, DsDNA, RF, SS, ANCA, Lyme, Hep panel, Quant TB, Syphilis, histoplasma ab/antigen, B2 glycoprotein ab, HIV.  CSF 9/15/2023: TNC 18 (lymph pred) MBP 7.4 Glucose 19 Protein 117 Lactate 5.7 [normal 1.1-2.4] ACE 4.6 [ normal 0-2.5] b2 microglobulin 5.1 [normal 0-2.4] PCR neg, WNV neg, VDRL neg Fungitell neg. fungal cx neg Histoplasma neg Culture/gram stain neg No AFB staining done, no AFB on culture to date Cytology/flow: neg cancer cells, scattered lymphoctyes and mononuclear cells (4% B cells without any surface Igs) MOG neg  Labs 5/11/23- Interleukin 2 recep neg, ACE neg, CASSI nl, RF nl, ESR and CRP nl. SSA/B nl. Vitamin D1.25 nl, KAILEY nl, DsDNA nl.

## 2024-06-19 ENCOUNTER — APPOINTMENT (OUTPATIENT)
Dept: RHEUMATOLOGY | Facility: CLINIC | Age: 34
End: 2024-06-19
Payer: COMMERCIAL

## 2024-06-19 VITALS
OXYGEN SATURATION: 98 % | TEMPERATURE: 98 F | SYSTOLIC BLOOD PRESSURE: 104 MMHG | HEART RATE: 97 BPM | DIASTOLIC BLOOD PRESSURE: 73 MMHG | RESPIRATION RATE: 16 BRPM

## 2024-06-19 VITALS
HEART RATE: 93 BPM | SYSTOLIC BLOOD PRESSURE: 111 MMHG | TEMPERATURE: 98 F | OXYGEN SATURATION: 98 % | RESPIRATION RATE: 16 BRPM | DIASTOLIC BLOOD PRESSURE: 78 MMHG

## 2024-06-19 PROCEDURE — 96366 THER/PROPH/DIAG IV INF ADDON: CPT

## 2024-06-19 PROCEDURE — 96365 THER/PROPH/DIAG IV INF INIT: CPT

## 2024-06-19 RX ORDER — INFLIXIMAB-DYYB 100 MG/10ML
100 INJECTION, POWDER, LYOPHILIZED, FOR SOLUTION INTRAVENOUS
Qty: 0 | Refills: 0 | Status: COMPLETED
Start: 2024-04-09

## 2024-06-19 NOTE — HISTORY OF PRESENT ILLNESS
[4] : 4 [Denies] : Denies [No] : No [Yes] : Yes [Declined] : Declined [TB] : Tuberculosis screening [Hep acute panel] : Hepatitis acute panel [Right upper extremity] : Right upper extremity [24g] : 24g [Start Time: ___] : Medication Start Time: [unfilled] [End Time: ___] : Medication End Time: [unfilled] [Medication Name: ___] : Medication Name: [unfilled] [Total Amount Administered: ___] : Total Amount Administered: [unfilled] [IV discontinued. Intact. No signs or symptoms of IV complications noted. Time: ___] : IV discontinued. Intact. No signs or symptoms of IV complications noted. Time: [unfilled] [Patient  instructed to seek medical attention with signs and symptoms of adverse effects] : Patient  instructed to seek medical attention with signs and symptoms of adverse effects [Patient left unit in no acute distress] : Patient left unit in no acute distress [Medications administered as ordered and tolerated well.] : Medications administered as ordered and tolerated well. [de-identified] : median antecubital vein  [de-identified] : 385 [de-identified] : Patient presents for week 6 Inflectra infusion, doing well overall. Patient denies any recent infection, antibiotic use or hospitalizations. Patient reports tolerating last infusion well. Patient continues to report tingling to the feet but states she feel her neuropathy has improved overall. No other symptoms or concerns noted at this time. Patient pre-medicated as ordered and tolerated infusion well. Discharged ambulatory in [facility].

## 2024-07-17 ENCOUNTER — APPOINTMENT (OUTPATIENT)
Dept: RHEUMATOLOGY | Facility: CLINIC | Age: 34
End: 2024-07-17
Payer: COMMERCIAL

## 2024-07-17 VITALS
HEART RATE: 87 BPM | SYSTOLIC BLOOD PRESSURE: 108 MMHG | DIASTOLIC BLOOD PRESSURE: 74 MMHG | RESPIRATION RATE: 16 BRPM | OXYGEN SATURATION: 99 %

## 2024-07-17 VITALS
HEART RATE: 86 BPM | DIASTOLIC BLOOD PRESSURE: 80 MMHG | SYSTOLIC BLOOD PRESSURE: 114 MMHG | TEMPERATURE: 97.9 F | OXYGEN SATURATION: 100 % | RESPIRATION RATE: 16 BRPM

## 2024-07-17 PROCEDURE — 96365 THER/PROPH/DIAG IV INF INIT: CPT

## 2024-07-17 PROCEDURE — 96366 THER/PROPH/DIAG IV INF ADDON: CPT

## 2024-07-17 RX ORDER — DIPHENHYDRAMINE HCL 25 MG/1
25 CAPSULE ORAL
Qty: 0 | Refills: 0 | Status: COMPLETED
Start: 2024-04-09

## 2024-07-17 RX ORDER — INFLIXIMAB-DYYB 100 MG/10ML
100 INJECTION, POWDER, LYOPHILIZED, FOR SOLUTION INTRAVENOUS
Qty: 0 | Refills: 0 | Status: COMPLETED
Start: 2024-04-09

## 2024-08-14 ENCOUNTER — APPOINTMENT (OUTPATIENT)
Dept: RHEUMATOLOGY | Facility: CLINIC | Age: 34
End: 2024-08-14
Payer: COMMERCIAL

## 2024-08-14 VITALS
DIASTOLIC BLOOD PRESSURE: 75 MMHG | HEART RATE: 84 BPM | OXYGEN SATURATION: 96 % | SYSTOLIC BLOOD PRESSURE: 106 MMHG | RESPIRATION RATE: 16 BRPM

## 2024-08-14 VITALS
SYSTOLIC BLOOD PRESSURE: 110 MMHG | OXYGEN SATURATION: 95 % | RESPIRATION RATE: 16 BRPM | HEART RATE: 93 BPM | DIASTOLIC BLOOD PRESSURE: 71 MMHG | TEMPERATURE: 98.2 F

## 2024-08-14 PROCEDURE — 96365 THER/PROPH/DIAG IV INF INIT: CPT

## 2024-08-14 PROCEDURE — 96366 THER/PROPH/DIAG IV INF ADDON: CPT

## 2024-08-14 RX ORDER — INFLIXIMAB-DYYB 100 MG/10ML
100 INJECTION, POWDER, LYOPHILIZED, FOR SOLUTION INTRAVENOUS
Qty: 0 | Refills: 0 | Status: COMPLETED
Start: 2024-04-09

## 2024-08-14 RX ORDER — DIPHENHYDRAMINE HCL 25 MG/1
25 CAPSULE ORAL
Qty: 0 | Refills: 0 | Status: COMPLETED
Start: 2024-04-09

## 2024-08-14 NOTE — HISTORY OF PRESENT ILLNESS
[Denies] : Denies [Yes] : Yes [Informed consent documented in EHR.] : Informed consent documented in EHR. [24g] : 24g [Start Time: ___] : Medication Start Time: [unfilled] [End Time: ___] : Medication End Time: [unfilled] [Medication Name: ___] : Medication Name: [unfilled] [Total Amount Administered: ___] : Total Amount Administered: [unfilled] [IV discontinued. Intact. No signs or symptoms of IV complications noted. Time: ___] : IV discontinued. Intact. No signs or symptoms of IV complications noted. Time: [unfilled] [Patient  instructed to seek medical attention with signs and symptoms of adverse effects] : Patient  instructed to seek medical attention with signs and symptoms of adverse effects [Patient left unit in no acute distress] : Patient left unit in no acute distress [Medications administered as ordered and tolerated well.] : Medications administered as ordered and tolerated well. [de-identified] : ambulate using cane [de-identified] : right arm accessory cephalic vein [de-identified] : no labs [de-identified] : Patient presents for scheduled Inflectra infusion, ambulating using cane in NAD. Today, patient reports overall to be doing well and denies any pain or discomfort at this time. Patient continues to report tingling primarily to the feet but states she still feels her neuropathy has improved overall. Patient reports fatigue. Denies SOB, CP and no recent falls.  No other symptoms or concerns verbalized. Patient pre-medicated as ordered and infusion toleratedwell.

## 2024-08-15 ENCOUNTER — APPOINTMENT (OUTPATIENT)
Dept: NEUROLOGY | Facility: CLINIC | Age: 34
End: 2024-08-15
Payer: COMMERCIAL

## 2024-08-15 VITALS
SYSTOLIC BLOOD PRESSURE: 120 MMHG | HEIGHT: 61 IN | HEART RATE: 86 BPM | DIASTOLIC BLOOD PRESSURE: 80 MMHG | BODY MASS INDEX: 32.1 KG/M2 | WEIGHT: 170 LBS

## 2024-08-15 DIAGNOSIS — D86.89 SARCOIDOSIS OF OTHER SITES: ICD-10-CM

## 2024-08-15 PROCEDURE — 99214 OFFICE O/P EST MOD 30 MIN: CPT

## 2024-08-15 PROCEDURE — G2211 COMPLEX E/M VISIT ADD ON: CPT

## 2024-08-15 NOTE — ASSESSMENT
[FreeTextEntry1] : Assessment/Plan: 32 yo female w/ diffuse leptomeningeal enhancement (predominately basilar involvement) with involvement of brain parenchyma (b/l basal ganglia)- dx'd 2/2023. She has been on steroid treatment since April/May 2023 with some clinical improvement, however MR imaging showed slight progression in leptomeningeal and parenchymal disease despite high dose steroids. A cervical LN biopsy revelated "necrotizing granuloma", however work up for malignancy, infection (TB, fungal) and vasculitis has been negative to date. 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 ? sarcoid with necrotizing granulomatous pattern.  Given persistent leptomeningeal enhancement despite high dose and prolonged prednisone treatment, Pt was initiated on Infliximab.  She remains clinically stable.  Plan:- [] Continue Inflectra [id number] infusions. (first dose 4/10/2024) [] Will repeat MR imaging of neuroaxis w/w/o contrast in 9/2024 - ordered [] CBC w/ diff and CMP, Quant TB - ordered [] Continue prednisone 10 mg QD - taper down after completion of MR imaging and as long as MR scans stable/improving. [] Continue pantoprazole 40 mg QD [] Continue gabapentin, 600 mg TID [] Nystatin mouth wash prescribed for thrush [] Increased urinary frequency and urgency- referred to urologist. [] PT for strengthening and balance training- pending.   RTC 4 weeks  The above plan was discussed with FELIPA WRIGHT in great detail. FELIPA WRIGHT verbalized understanding and agrees with plan as detailed above. Patient was provided education and counselling on current diagnosis/symptoms. She was advised to call our clinic at [phone] for any new or worsening symptoms, or with any questions or concerns. FELIPA WRIGHT expressed understanding and all her questions/concerns were addressed.  Irma Krishnan M.D.

## 2024-08-15 NOTE — HISTORY OF PRESENT ILLNESS
[FreeTextEntry1] : INTERIM HX 08/15/2024: She is feeling a lot better. Neuropathy stable in feet. No vision concerns. On pred 10 mg QD. Tolerating infusions well. No headaches or dizziness. No new symptoms.  INTERIM HX 06/14/2024: last infusion 5/21. Thrush better. Paresthesia's limited in feet, on gabapentin. On prednisone 15 mg QD. Doing "pretty okay". Increased frequency and urgency, no change in smell, no burning. Does drink a lot of water. Diarrhea incontinence once a month since April.   INTERIM HX 05/10/2024: Started inflectra infusions 4/10, next dose 5/21. Pt stable. She has good days and bad days. Continues to work full time. Gait/balance has improved- mom agrees with this. No brand new symptoms. She denies any headaches. Gabapentin helps paresthesia's in LE.   INTERIM HX 02/13/2024: [This is telehealth 2 -way video visit]  Pt home for snow day. She reports more bad days than good days. Neuropathic pain has been more bothersome, she is taking gabapentin 300 mg TID.  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 No evidence of lymphoma or malignancy. AFB negative for acid fast bacilli, GMS negative for fungal organisms.   MR brain w/w/o 12/28/2023- no change since 10/12/2023, reidentification of dominantly basilar leptomeningeal enhancement. Stable abnormal signal in R>L basal ganglia. No acute stroke or hemorrhage.  MR C spine w/w/o 12/28/2023- stable compared to 2/20/2023 and 8/2023.  MRA head and neck 1/2/2024-unremarkable.   EEG 12/27/2023- normal    INTERIM HX 12/19/2023: Repeat PET scan 11/16/2023- stable (persistent hypermetabolic R cervical lymph nodes). Referred to Head/neck surgery for excisional biopsy to get more tissue sample to rule out infection/malignancy. Being discharged from OT, continuing PT- working on balance. 4-6 episodes of right sided weakness (RUE /RLE) that last < 20 min, in the last 2 weeks. She has been under more stress at work. Tingling in legs a little more bothersome. Walking better.   INTERIM HX 10/30/2023: Clinically doing well, PT has rly helped her. Feels more confident walking without a cane now. Balance better. Denies headaches, dizziness less often. No diplopia. Diagnosed with early glaucoma.   Seen by NSGY for potential brain biopsy, deferred to due resolution of enhancement of R BG lesion and high risk procedure. Pt remains on prednisone 20 mg QD. MR spect 10/12/2023 normal. Repeat MRI brain w/w/o 10/12/2023 with resolution of enhancement in R BG and possibly mild improvement in basilar leptomeningeal enhancement. Signal changes in central satinder and L BG seem more prominent on current scan. no new lesions/enhancement.   serum labs 9/6/2023:- ESR 26. C3 164 (normal ) Leukocytosis has improved, 19 K (6/2023) and now 11k. ANC 16K and now 8k. Normal labs: C4, Ig4, SPEP, Vitamin D 1,25 and 25, CRP, ACE, KAILEY, DsDNA, RF, SS, ANCA, Lyme, Hep panel, Quant TB, Syphilis, histoplasma ab/antigen, B2 glycoprotein ab, HIV.  CSF 9/15/2023: TNC 18 (lymph pred) MBP 7.4 Glucose 19 Protein 117 Lactate 5.7 [normal 1.1-2.4] ACE 4.6 [ normal 0-2.5] b2 microglobulin 5.1 [normal 0-2.4] PCR neg, WNV neg, VDRL neg Fungitell neg. fungal cx neg Histoplasma neg Culture/gram stain neg No AFB staining done, no AFB on culture to date Cytology/flow: neg cancer cells, scattered lymphoctyes and mononuclear cells (4% B cells without any surface Igs) MOG neg  INTERIM HX 08/25/2023: Here to review results of cervical LN biopsy results and MRI results. cervical LN biopsy- c/w necrotizing granulomas, negative for malignant cells, no organisms on special stains (AFB, GMS). MRI brain, cervical and thoracic spine w/w/o 8/15/2023- C and T spine w/ similar extensive cord surface leptomeningeal enhancement compared w/ 2/2023. 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   Pt remains on prednisone 20 mg QD. Reports more good days lately. Did have an episode 2 weeks ago, she was out in the heat and came back in and felt very dizzy and body felt weak, could not keep herself up. No LOC or seizure likely activity.  Overall, she reports headaches have resolved, diplopia resolved, vision loss improved. Dizziness is on and off. GAit is still not normal, but balance a little better. In PT and OT. Tingling in extremities is constant. Joint pains is stable and intermittent.  She believes steroids has helped with clinical symptoms some, and did note a little worsening at times of tapering down dosage.  INTERIM HX 07/24/2023: After our last visit, pt completed several studies (detailed below), also completed 5 days IV solumedrol 4/28, 5/1-5/4, now on prednisone taper (currently at 20 mg QD) Labs 5/11/23- Interleukin 2 recep neg, ACE neg, CASSI nl, RF nl, ESR and CRP nl. SSA/B nl. Vitamin D1.25 nl, KAILEY nl, DsDNA nl. CT chest/abd/pelvis 5/11/23- neg for malignancy/lymphadenopathy MRI brain and orbits w/w/o 5/11/2023- Stable diffuse leptomeningeal enh.  FDG PET scan 7/3/2023- Few FDG-avid right cervical lymph nodes (needle biopsy recommended)  Seen by neuro oph (Dr Chawla) 7/10- VA 20/40 OD and 20/30 OS, trace APD on the left. superior arcuate VF defect OS > OS.  Never did PT. On prednisone 20 mg QD for the past 2 weeks. Having more bad days then good days- muscle fatigue at end of day, constant tingling throughout the body- maybe due to her starting work. Uses a cane. No longer has diplopia. Occasional vertigo. No brand new symptoms. "Good days are very good".  --------------------------------------------------------------------------------------------------------------------------------------- [facility] (4/20/2023)- 32 year old RH female, previously healthy, developed new onset intermittent bifrontal HA, dizziness/vertigo, gait imbalance and diplopia symptoms beginning 2/2023. She was seen by Dr. Estephania James (neuro) 2/13 for her symptoms and MRI's were recommended. However, given new onset b/l LE weakness/numbness/tingling, she presented to [facility] on 2/18, where she was admitted and underwent work up- CT head, CTA/CTV head, MRI of neuroaxis and spinal tap, with work up revealing diffuse leptomeningeal enhancement of brain and spinal cord, and spinal tap showing 34 TNC (85% lymph), glucose 16 (low), protein 207, MBP 13.7, matched and unique OCB (5), MOG neg, enceph panel neg, Culture/gram stain/AFB/PCR neg, ACE 7.1 (normal 0-2.5), flow cytometry with increased small lymph. Serum studies ( Quant TB, KAILEY, Syphilis, ACE negative), ESR 16. She was seen by ID and Neuro. Recommended outpatient follow up. No steroids given in hospital. She left for DR dick 10 days (vacation), 1 week after hospital discharge.    Imaging at [facility] - - CTA H/N and CTV head 2/18/2023- no vascular stenosis, dissection or aneurysm. No dural venous sinus thrombosis.  MRI brain and C/T spine w/w/o contrast 2/20/2023- Diffuse leptomeningeal disease of brain and spinal cord, scattered nodular enhancement pattern in brain and cervicomedullary junction, involvement of optic chiasm, b/l prox ON, satinder/medulla/cerebellum with b/l cranial nerves involved. A few punctuate subcortical WM lesions.  Interim Hx- -  She continues to be symptomatic. HA and diplopia resolved. Experiences intermittent "dimming" of vision peripherally b/l x 1 week. Sees ophthalmologist (Dr. Tavarez, [facility] referred to neuro ophtho, told she had "pre - glaucoma", put on drops.  No pain with eye movements. No vertigo, but has imbalance (not better, more frequent). Intermittent tingling over arms and legs- more frequent. Weakness in legs.   She is from . Works for [id number] and [employer] in [place].

## 2024-08-15 NOTE — DATA REVIEWED
[de-identified] :  MRA head and neck 1/2/2024-unremarkable.    MR brain w/w/o 12/28/2023- no change since 10/12/2023, reidentification of dominantly basilar leptomeningeal enhancement. Stable abnormal signal in R>L basal ganglia. No acute stroke or hemorrhage.  MR C spine w/w/o 12/28/2023- stable compared to 2/20/2023 and 8/2023.  MRA head and neck 1/2/2024-unremarkable.    Repeat PET scan 11/16/2023- stable ( persistent hypermetabolic R cervical lymph nodes)  MR spect 10/12/2023 normal. Repeat MRI brain w/w/o 10/12/2023 with resolution of enhancement in R BG and possibly mild improvement in basilar leptomeningeal enhancement. Signal changes in central satinder and L BG seem more prominent on current scan. no new lesions/enhancement.    MRI brain, cervical and thoracic spine w/w/o 8/15/2023- C and T spine w/ similar extensive cord surface leptomeningeal enhancement compared w/ 2/2023. 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   CT chest/abd/pelvis 5/11/23- neg for malignancy/lymphadenopathy MRI brain and orbits w/w/o 5/11/2023- Stable diffuse leptomeningeal enh.  FDG PET scan 7/3/2023- Few FDG-avid right cervical lymph nodes (needle biopsy recommended)   Imaging at [facility] - - CTA H/N and CTV head 2/18/2023- no vascular stenosis, dissection or aneurysm. No dural venous sinus thrombosis.  MRI brain and C/T spine w/w/o contrast 2/20/2023- Diffuse leptomeningeal disease of brain and spinal cord, scattered nodular enhancement pattern in brain and cervicomedullary junction, involvement of optic chiasm, b/l prox ON, satinder/medulla/cerebellum with b/l cranial nerves involved. A few punctuate subcortical WM lesions. .    [de-identified] :   serum labs 9/6/2023:- ESR 26. C3 164 (normal ) Leukocytosis has improved, 19 K (6/2023) and now 11k. ANC 16K and now 8k. Normal labs: C4, Ig4, SPEP, Vitamin D 1,25 and 25, CRP, ACE, KAILEY, DsDNA, RF, SS, ANCA, Lyme, Hep panel, Quant TB, Syphilis, histoplasma ab/antigen, B2 glycoprotein ab, HIV.  CSF 9/15/2023: TNC 18 (lymph pred) MBP 7.4 Glucose 19 Protein 117 Lactate 5.7 [normal 1.1-2.4] ACE 4.6 [ normal 0-2.5] b2 microglobulin 5.1 [normal 0-2.4] PCR neg, WNV neg, VDRL neg Fungitell neg. fungal cx neg Histoplasma neg Culture/gram stain neg No AFB staining done, no AFB on culture to date Cytology/flow: neg cancer cells, scattered lymphoctyes and mononuclear cells (4% B cells without any surface Igs) MOG neg  Labs 5/11/23- Interleukin 2 recep neg, ACE neg, CASSI nl, RF nl, ESR and CRP nl. SSA/B nl. Vitamin D1.25 nl, KALIEY nl, DsDNA nl.

## 2024-08-15 NOTE — PHYSICAL EXAM
[FreeTextEntry1] : PHYSICAL EXAM Constitutional: Alert, no acute distress Psychiatric: appropriate affect and mood Pulmonary: No respiratory distress, stable on room air  NEUROLOGICAL EXAM Mental status: The patient is alert, attentive and conversational memory intact. Speech/language: No dysarthria Cranial nerves: CN II: Visual fields are full to confrontation. R pupil 8.5 mm and left pupil 9 mm and both reactive. VA 20/30 OD and 20/25 OS CN III, IV, VI: EOMI, no nystagmus, no ptosis CN V: Facial sensation is intact to pinprick in all 3 divisions bilaterally. CN VII: Face is symmetric with normal eye closure and smile. CN VII: Hearing is normal to rubbing fingers CN IX, X: Palate elevates symmetrically. CN XI: Head turning and shoulder shrug are intact CN XII: Tongue is midline with normal movements and no atrophy. Motor: Strength is full bilaterally. 5/5 muscle power in bilateral UE and LE. Reflexes: R L  Biceps 2+ 2+  Patellar 2+ 1+  Achilles 0 0 Plantar responses- R down, L down Sensory: RUE/RLE LUE/LLE light touch +/+ +/+ Pinprick + /+ +/+ Coordination/Cerebellar: There is no dysmetria on finger-to-nose and heel to shin. Gait/Stance: Wide based gait, mild ataxia. Can walk without a cane. Tandem gait unsteady. Romberg with mild sway.

## 2024-08-29 ENCOUNTER — NON-APPOINTMENT (OUTPATIENT)
Age: 34
End: 2024-08-29

## 2024-09-03 ENCOUNTER — APPOINTMENT (OUTPATIENT)
Dept: MRI IMAGING | Facility: CLINIC | Age: 34
End: 2024-09-03
Payer: COMMERCIAL

## 2024-09-03 PROCEDURE — 72156 MRI NECK SPINE W/O & W/DYE: CPT

## 2024-09-03 PROCEDURE — A9585: CPT

## 2024-09-03 PROCEDURE — 70553 MRI BRAIN STEM W/O & W/DYE: CPT

## 2024-09-06 ENCOUNTER — APPOINTMENT (OUTPATIENT)
Dept: MRI IMAGING | Facility: CLINIC | Age: 34
End: 2024-09-06

## 2024-09-06 PROCEDURE — 72158 MRI LUMBAR SPINE W/O & W/DYE: CPT

## 2024-09-06 PROCEDURE — A9585: CPT

## 2024-09-06 PROCEDURE — 72157 MRI CHEST SPINE W/O & W/DYE: CPT

## 2024-09-10 ENCOUNTER — NON-APPOINTMENT (OUTPATIENT)
Age: 34
End: 2024-09-10

## 2024-09-11 ENCOUNTER — APPOINTMENT (OUTPATIENT)
Dept: RHEUMATOLOGY | Facility: CLINIC | Age: 34
End: 2024-09-11
Payer: COMMERCIAL

## 2024-09-11 VITALS
HEART RATE: 93 BPM | OXYGEN SATURATION: 97 % | SYSTOLIC BLOOD PRESSURE: 112 MMHG | RESPIRATION RATE: 16 BRPM | DIASTOLIC BLOOD PRESSURE: 77 MMHG | TEMPERATURE: 97.8 F

## 2024-09-11 VITALS
HEART RATE: 77 BPM | RESPIRATION RATE: 16 BRPM | OXYGEN SATURATION: 99 % | SYSTOLIC BLOOD PRESSURE: 109 MMHG | DIASTOLIC BLOOD PRESSURE: 76 MMHG

## 2024-09-11 PROCEDURE — 96365 THER/PROPH/DIAG IV INF INIT: CPT

## 2024-09-11 PROCEDURE — 96366 THER/PROPH/DIAG IV INF ADDON: CPT

## 2024-09-11 RX ORDER — DIPHENHYDRAMINE HCL 25 MG/1
25 CAPSULE ORAL
Qty: 0 | Refills: 0 | Status: COMPLETED
Start: 2024-04-09

## 2024-09-11 RX ORDER — INFLIXIMAB-DYYB 100 MG/10ML
100 INJECTION, POWDER, LYOPHILIZED, FOR SOLUTION INTRAVENOUS
Qty: 0 | Refills: 0 | Status: COMPLETED
Start: 2024-04-09

## 2024-09-11 NOTE — HISTORY OF PRESENT ILLNESS
[Denies] : Denies [Yes] : Yes [Declined] : Declined [24g] : 24g [Start Time: ___] : Medication Start Time: [unfilled] [End Time: ___] : Medication End Time: [unfilled] [Medication Name: ___] : Medication Name: [unfilled] [Total Amount Administered: ___] : Total Amount Administered: [unfilled] [IV discontinued. Intact. No signs or symptoms of IV complications noted. Time: ___] : IV discontinued. Intact. No signs or symptoms of IV complications noted. Time: [unfilled] [Patient  instructed to seek medical attention with signs and symptoms of adverse effects] : Patient  instructed to seek medical attention with signs and symptoms of adverse effects [Patient left unit in no acute distress] : Patient left unit in no acute distress [Medications administered as ordered and tolerated well.] : Medications administered as ordered and tolerated well. [de-identified] : ambulate using cane [de-identified] : right arm cephalic vein [de-identified] : no labs [de-identified] : Patient presents for scheduled Inflectra infusion, ambulating using cane in NAD. Patient denies any recent infections, ABX use or hospitalizations. Patient denies any pain or discomfort at this time. Patient continues to report tingling primarily to the feet states her fatigue has gotten better. Patient denies SOB, CP and has had no recent falls.  No other symptoms or concerns verbalized. Patient pre-medicated as ordered and infusion tolerated well.

## 2024-09-16 ENCOUNTER — APPOINTMENT (OUTPATIENT)
Dept: NEUROLOGY | Facility: CLINIC | Age: 34
End: 2024-09-16
Payer: COMMERCIAL

## 2024-09-16 VITALS
DIASTOLIC BLOOD PRESSURE: 86 MMHG | WEIGHT: 170 LBS | SYSTOLIC BLOOD PRESSURE: 119 MMHG | HEIGHT: 61 IN | HEART RATE: 100 BPM | BODY MASS INDEX: 32.1 KG/M2

## 2024-09-16 DIAGNOSIS — D86.89 SARCOIDOSIS OF OTHER SITES: ICD-10-CM

## 2024-09-16 PROCEDURE — 99214 OFFICE O/P EST MOD 30 MIN: CPT

## 2024-09-16 PROCEDURE — G2211 COMPLEX E/M VISIT ADD ON: CPT | Mod: NC

## 2024-09-16 NOTE — HISTORY OF PRESENT ILLNESS
[FreeTextEntry1] : INTERIM HX 09/16/2024: remicade infusion 9/11, usually tired after infusions. She continues to do well, no new concerns, neuropathy in feet tolerable. MRI scans completed and reviewed with patient, there is near resolution of leptomeningeal enhancement on brain MRI with stable chronic parenchymal changes in b/l BG and satinder. C and T cord leptomeningeal enhancement has resolved and with residual lepto involving conus and cauda equina nerve roots.   INTERIM HX 08/15/2024: She is feeling a lot better. Neuropathy stable in feet. No vision concerns. On pred 10 mg QD. Tolerating infusions well. No headaches or dizziness. No new symptoms.  INTERIM HX 06/14/2024: last infusion 5/21. Thrush better. Paresthesia's limited in feet, on gabapentin. On prednisone 15 mg QD. Doing "pretty okay". Increased frequency and urgency, no change in smell, no burning. Does drink a lot of water. Diarrhea incontinence once a month since April.   INTERIM HX 05/10/2024: Started inflectra infusions 4/10, next dose 5/21. Pt stable. She has good days and bad days. Continues to work full time. Gait/balance has improved- mom agrees with this. No brand new symptoms. She denies any headaches. Gabapentin helps paresthesia's in LE.   INTERIM HX 02/13/2024: [This is telehealth 2 -way video visit]  Pt home for snow day. She reports more bad days than good days. Neuropathic pain has been more bothersome, she is taking gabapentin 300 mg TID.  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 No evidence of lymphoma or malignancy. AFB negative for acid fast bacilli, GMS negative for fungal organisms.   MR brain w/w/o 12/28/2023- no change since 10/12/2023, reidentification of dominantly basilar leptomeningeal enhancement. Stable abnormal signal in R>L basal ganglia. No acute stroke or hemorrhage.  MR C spine w/w/o 12/28/2023- stable compared to 2/20/2023 and 8/2023.  MRA head and neck 1/2/2024-unremarkable.   EEG 12/27/2023- normal    INTERIM HX 12/19/2023: Repeat PET scan 11/16/2023- stable (persistent hypermetabolic R cervical lymph nodes). Referred to Head/neck surgery for excisional biopsy to get more tissue sample to rule out infection/malignancy. Being discharged from OT, continuing PT- working on balance. 4-6 episodes of right sided weakness (RUE /RLE) that last < 20 min, in the last 2 weeks. She has been under more stress at work. Tingling in legs a little more bothersome. Walking better.   INTERIM HX 10/30/2023: Clinically doing well, PT has rly helped her. Feels more confident walking without a cane now. Balance better. Denies headaches, dizziness less often. No diplopia. Diagnosed with early glaucoma.   Seen by NSGY for potential brain biopsy, deferred to due resolution of enhancement of R BG lesion and high risk procedure. Pt remains on prednisone 20 mg QD. MR spect 10/12/2023 normal. Repeat MRI brain w/w/o 10/12/2023 with resolution of enhancement in R BG and possibly mild improvement in basilar leptomeningeal enhancement. Signal changes in central satinder and L BG seem more prominent on current scan. no new lesions/enhancement.   serum labs 9/6/2023:- ESR 26. C3 164 (normal ) Leukocytosis has improved, 19 K (6/2023) and now 11k. ANC 16K and now 8k. Normal labs: C4, Ig4, SPEP, Vitamin D 1,25 and 25, CRP, ACE, KAILEY, DsDNA, RF, SS, ANCA, Lyme, Hep panel, Quant TB, Syphilis, histoplasma ab/antigen, B2 glycoprotein ab, HIV.  CSF 9/15/2023: TNC 18 (lymph pred) MBP 7.4 Glucose 19 Protein 117 Lactate 5.7 [normal 1.1-2.4] ACE 4.6 [ normal 0-2.5] b2 microglobulin 5.1 [normal 0-2.4] PCR neg, WNV neg, VDRL neg Fungitell neg. fungal cx neg Histoplasma neg Culture/gram stain neg No AFB staining done, no AFB on culture to date Cytology/flow: neg cancer cells, scattered lymphoctyes and mononuclear cells (4% B cells without any surface Igs) MOG neg  INTERIM HX 08/25/2023: Here to review results of cervical LN biopsy results and MRI results. cervical LN biopsy- c/w necrotizing granulomas, negative for malignant cells, no organisms on special stains (AFB, GMS). MRI brain, cervical and thoracic spine w/w/o 8/15/2023- C and T spine w/ similar extensive cord surface leptomeningeal enhancement compared w/ 2/2023. 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   Pt remains on prednisone 20 mg QD. Reports more good days lately. Did have an episode 2 weeks ago, she was out in the heat and came back in and felt very dizzy and body felt weak, could not keep herself up. No LOC or seizure likely activity.  Overall, she reports headaches have resolved, diplopia resolved, vision loss improved. Dizziness is on and off. GAit is still not normal, but balance a little better. In PT and OT. Tingling in extremities is constant. Joint pains is stable and intermittent.  She believes steroids has helped with clinical symptoms some, and did note a little worsening at times of tapering down dosage.  INTERIM HX 07/24/2023: After our last visit, pt completed several studies (detailed below), also completed 5 days IV solumedrol 4/28, 5/1-5/4, now on prednisone taper (currently at 20 mg QD) Labs 5/11/23- Interleukin 2 recep neg, ACE neg, CASSI nl, RF nl, ESR and CRP nl. SSA/B nl. Vitamin D1.25 nl, KAILEY nl, DsDNA nl. CT chest/abd/pelvis 5/11/23- neg for malignancy/lymphadenopathy MRI brain and orbits w/w/o 5/11/2023- Stable diffuse leptomeningeal enh.  FDG PET scan 7/3/2023- Few FDG-avid right cervical lymph nodes (needle biopsy recommended)  Seen by neuro oph (Dr Chawla) 7/10- VA 20/40 OD and 20/30 OS, trace APD on the left. superior arcuate VF defect OS > OS.  Never did PT. On prednisone 20 mg QD for the past 2 weeks. Having more bad days then good days- muscle fatigue at end of day, constant tingling throughout the body- maybe due to her starting work. Uses a cane. No longer has diplopia. Occasional vertigo. No brand new symptoms. "Good days are very good".  --------------------------------------------------------------------------------------------------------------------------------------- [facility] (4/20/2023)- 32 year old RH female, previously healthy, developed new onset intermittent bifrontal HA, dizziness/vertigo, gait imbalance and diplopia symptoms beginning 2/2023. She was seen by Dr. Estephania James (neuro) 2/13 for her symptoms and MRI's were recommended. However, given new onset b/l LE weakness/numbness/tingling, she presented to [facility] on 2/18, where she was admitted and underwent work up- CT head, CTA/CTV head, MRI of neuroaxis and spinal tap, with work up revealing diffuse leptomeningeal enhancement of brain and spinal cord, and spinal tap showing 34 TNC (85% lymph), glucose 16 (low), protein 207, MBP 13.7, matched and unique OCB (5), MOG neg, enceph panel neg, Culture/gram stain/AFB/PCR neg, ACE 7.1 (normal 0-2.5), flow cytometry with increased small lymph. Serum studies ( Quant TB, KAILEY, Syphilis, ACE negative), ESR 16. She was seen by ID and Neuro. Recommended outpatient follow up. No steroids given in hospital. She left for DR x 10 days (vacation), 1 week after hospital discharge.    Imaging at [facility] - - CTA H/N and CTV head 2/18/2023- no vascular stenosis, dissection or aneurysm. No dural venous sinus thrombosis.  MRI brain and C/T spine w/w/o contrast 2/20/2023- Diffuse leptomeningeal disease of brain and spinal cord, scattered nodular enhancement pattern in brain and cervicomedullary junction, involvement of optic chiasm, b/l prox ON, satinder/medulla/cerebellum with b/l cranial nerves involved. A few punctuate subcortical WM lesions.  Interim Hx- -  She continues to be symptomatic. HA and diplopia resolved. Experiences intermittent "dimming" of vision peripherally b/l x 1 week. Sees ophthalmologist (Dr. Tavarez, [facility] referred to neuro ophtho, told she had "pre - glaucoma", put on drops.  No pain with eye movements. No vertigo, but has imbalance (not better, more frequent). Intermittent tingling over arms and legs- more frequent. Weakness in legs.   She is from . Works for [employer] in [place].

## 2024-09-16 NOTE — ASSESSMENT
[FreeTextEntry1] : Assessment/Plan: 32 yo female w/ diffuse leptomeningeal enhancement (predominately basilar involvement) with involvement of brain parenchyma (b/l basal ganglia)- dx'd 2/2023. She has been on steroid treatment since April/May 2023 with some clinical improvement, however MR imaging showed slight progression in leptomeningeal and parenchymal disease despite high dose steroids. A cervical LN biopsy revelated "necrotizing granuloma", however work up for malignancy, infection (TB, fungal) and vasculitis has been negative to date. 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  Given persistent leptomeningeal enhancement despite high dose and prolonged prednisone treatment, pt was initiated on Infliximab 4/2024.  She remains clinically stable. Repeat MR imaging with resolution of brain and C/T spine leptomeningeal enhancement with mild residual lepto involving conus and cauda equina nerve roots.  Plan:- [] Continue Inflectra [id number] infusions. (first dose 4/10/2024) [] Will repeat MR imaging of brain and L spine w/w/o contrast in in 6 months (3/2025)- ordered [] Taper prednisone, currently on 10 mg QD. Taper down to 2.5 mg QD every 2 weeks and then off.  [] Continue pantoprazole 40 mg QD while on steroids [] Continue gabapentin, 600 mg TID [] Nystatin mouth wash prescribed for thrush [] Increased urinary frequency and urgency- referred to urologist. [] PT for strengthening and balance training- pending.  RTC 6 months  The above plan was discussed with FELIPA WRIGHT in great detail. FELIPA WRIGHT verbalized understanding and agrees with plan as detailed above. Patient was provided education and counselling on current diagnosis/symptoms. She was advised to call our clinic at [phone] for any new or worsening symptoms, or with any questions or concerns. FELIPA WRIGHT expressed understanding and all her questions/concerns were addressed.  Irma Krishnan M.D.

## 2024-09-16 NOTE — DATA REVIEWED
[de-identified] : MRI brain, C/T/L Spine w/w/o contrast 9/6/2024- near resolution of leptomeningeal enhancement on brain MRI with stable chronic parenchymal changes in b/l BG and satinder. C and T cord leptomeningeal enhancement has resolved and with residual lepto involving conus and cauda equina nerve roots.  MRA head and neck 1/2/2024-unremarkable.    MR brain w/w/o 12/28/2023- no change since 10/12/2023, reidentification of dominantly basilar leptomeningeal enhancement. Stable abnormal signal in R>L basal ganglia. No acute stroke or hemorrhage.  MR C spine w/w/o 12/28/2023- stable compared to 2/20/2023 and 8/2023.  MRA head and neck 1/2/2024-unremarkable.    Repeat PET scan 11/16/2023- stable ( persistent hypermetabolic R cervical lymph nodes)  MR spect 10/12/2023 normal. Repeat MRI brain w/w/o 10/12/2023 with resolution of enhancement in R BG and possibly mild improvement in basilar leptomeningeal enhancement. Signal changes in central satinder and L BG seem more prominent on current scan. no new lesions/enhancement.    MRI brain, cervical and thoracic spine w/w/o 8/15/2023- C and T spine w/ similar extensive cord surface leptomeningeal enhancement compared w/ 2/2023. 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   CT chest/abd/pelvis 5/11/23- neg for malignancy/lymphadenopathy MRI brain and orbits w/w/o 5/11/2023- Stable diffuse leptomeningeal enh.  FDG PET scan 7/3/2023- Few FDG-avid right cervical lymph nodes (needle biopsy recommended)   Imaging at [facility] - - CTA H/N and CTV head 2/18/2023- no vascular stenosis, dissection or aneurysm. No dural venous sinus thrombosis.  MRI brain and C/T spine w/w/o contrast 2/20/2023- Diffuse leptomeningeal disease of brain and spinal cord, scattered nodular enhancement pattern in brain and cervicomedullary junction, involvement of optic chiasm, b/l prox ON, satinder/medulla/cerebellum with b/l cranial nerves involved. A few punctuate subcortical WM lesions. .    [de-identified] :   serum labs 9/6/2023:- ESR 26. C3 164 (normal ) Leukocytosis has improved, 19 K (6/2023) and now 11k. ANC 16K and now 8k. Normal labs: C4, Ig4, SPEP, Vitamin D 1,25 and 25, CRP, ACE, KAILEY, DsDNA, RF, SS, ANCA, Lyme, Hep panel, Quant TB, Syphilis, histoplasma ab/antigen, B2 glycoprotein ab, HIV.  CSF 9/15/2023: TNC 18 (lymph pred) MBP 7.4 Glucose 19 Protein 117 Lactate 5.7 [normal 1.1-2.4] ACE 4.6 [ normal 0-2.5] b2 microglobulin 5.1 [normal 0-2.4] PCR neg, WNV neg, VDRL neg Fungitell neg. fungal cx neg Histoplasma neg Culture/gram stain neg No AFB staining done, no AFB on culture to date Cytology/flow: neg cancer cells, scattered lymphoctyes and mononuclear cells (4% B cells without any surface Igs) MOG neg  Labs 5/11/23- Interleukin 2 recep neg, ACE neg, CASSI nl, RF nl, ESR and CRP nl. SSA/B nl. Vitamin D1.25 nl, KAILEY nl, DsDNA nl.

## 2024-10-09 ENCOUNTER — APPOINTMENT (OUTPATIENT)
Dept: RHEUMATOLOGY | Facility: CLINIC | Age: 34
End: 2024-10-09
Payer: COMMERCIAL

## 2024-10-09 ENCOUNTER — APPOINTMENT (OUTPATIENT)
Age: 34
End: 2024-10-09
Payer: COMMERCIAL

## 2024-10-09 VITALS
HEART RATE: 107 BPM | OXYGEN SATURATION: 98 % | SYSTOLIC BLOOD PRESSURE: 109 MMHG | RESPIRATION RATE: 16 BRPM | TEMPERATURE: 98.1 F | DIASTOLIC BLOOD PRESSURE: 78 MMHG

## 2024-10-09 VITALS
DIASTOLIC BLOOD PRESSURE: 78 MMHG | HEIGHT: 61 IN | SYSTOLIC BLOOD PRESSURE: 110 MMHG | WEIGHT: 169 LBS | HEART RATE: 88 BPM | RESPIRATION RATE: 15 BRPM | TEMPERATURE: 98 F | OXYGEN SATURATION: 98 % | BODY MASS INDEX: 31.91 KG/M2

## 2024-10-09 VITALS — SYSTOLIC BLOOD PRESSURE: 118 MMHG | DIASTOLIC BLOOD PRESSURE: 82 MMHG | OXYGEN SATURATION: 98 % | HEART RATE: 91 BPM

## 2024-10-09 DIAGNOSIS — R26.9 UNSPECIFIED ABNORMALITIES OF GAIT AND MOBILITY: ICD-10-CM

## 2024-10-09 PROCEDURE — 99214 OFFICE O/P EST MOD 30 MIN: CPT

## 2024-10-09 PROCEDURE — 96366 THER/PROPH/DIAG IV INF ADDON: CPT

## 2024-10-09 PROCEDURE — 96365 THER/PROPH/DIAG IV INF INIT: CPT

## 2024-10-09 RX ORDER — INFLIXIMAB-DYYB 100 MG/10ML
100 INJECTION, POWDER, LYOPHILIZED, FOR SOLUTION INTRAVENOUS
Qty: 0 | Refills: 0 | Status: COMPLETED
Start: 2024-04-09

## 2024-10-09 RX ORDER — DIPHENHYDRAMINE HCL 25 MG/1
25 CAPSULE ORAL
Qty: 0 | Refills: 0 | Status: COMPLETED
Start: 2024-04-09

## 2024-10-10 LAB
ANION GAP SERPL CALC-SCNC: 14 MMOL/L
APTT BLD: 32.7 SEC
BUN SERPL-MCNC: 6 MG/DL
CALCIUM SERPL-MCNC: 9.8 MG/DL
CHLORIDE SERPL-SCNC: 110 MMOL/L
CO2 SERPL-SCNC: 23 MMOL/L
CREAT SERPL-MCNC: 0.68 MG/DL
EGFR: 119 ML/MIN/1.73M2
GLUCOSE SERPL-MCNC: 86 MG/DL
HCT VFR BLD CALC: 44.2 %
HGB BLD-MCNC: 14.1 G/DL
MCHC RBC-ENTMCNC: 31.9 GM/DL
MCHC RBC-ENTMCNC: 32 PG
MCV RBC AUTO: 100.5 FL
PLATELET # BLD AUTO: 392 K/UL
POTASSIUM SERPL-SCNC: 5 MMOL/L
RBC # BLD: 4.4 M/UL
RBC # FLD: 14.5 %
SODIUM SERPL-SCNC: 147 MMOL/L
WBC # FLD AUTO: 8.18 K/UL

## 2024-10-11 LAB
INR PPP: 0.91 RATIO
PT BLD: 10.7 SEC

## 2024-10-22 ENCOUNTER — NON-APPOINTMENT (OUTPATIENT)
Age: 34
End: 2024-10-22

## 2024-11-06 ENCOUNTER — APPOINTMENT (OUTPATIENT)
Dept: RHEUMATOLOGY | Facility: CLINIC | Age: 34
End: 2024-11-06
Payer: COMMERCIAL

## 2024-11-06 VITALS — HEART RATE: 69 BPM | DIASTOLIC BLOOD PRESSURE: 71 MMHG | SYSTOLIC BLOOD PRESSURE: 105 MMHG

## 2024-11-06 VITALS
RESPIRATION RATE: 16 BRPM | TEMPERATURE: 98.7 F | OXYGEN SATURATION: 98 % | DIASTOLIC BLOOD PRESSURE: 74 MMHG | HEART RATE: 114 BPM | SYSTOLIC BLOOD PRESSURE: 102 MMHG

## 2024-11-06 PROCEDURE — 96366 THER/PROPH/DIAG IV INF ADDON: CPT

## 2024-11-06 PROCEDURE — 96365 THER/PROPH/DIAG IV INF INIT: CPT

## 2024-11-06 RX ORDER — INFLIXIMAB-DYYB 100 MG/10ML
100 INJECTION, POWDER, LYOPHILIZED, FOR SOLUTION INTRAVENOUS
Qty: 0 | Refills: 0 | Status: COMPLETED
Start: 2024-04-09

## 2024-11-06 RX ORDER — DIPHENHYDRAMINE HCL 25 MG/1
25 CAPSULE ORAL
Qty: 0 | Refills: 0 | Status: COMPLETED
Start: 2024-04-09

## 2024-12-04 ENCOUNTER — APPOINTMENT (OUTPATIENT)
Dept: RHEUMATOLOGY | Facility: CLINIC | Age: 34
End: 2024-12-04
Payer: COMMERCIAL

## 2024-12-04 VITALS — DIASTOLIC BLOOD PRESSURE: 71 MMHG | SYSTOLIC BLOOD PRESSURE: 103 MMHG | HEART RATE: 81 BPM

## 2024-12-04 VITALS
OXYGEN SATURATION: 95 % | DIASTOLIC BLOOD PRESSURE: 72 MMHG | HEART RATE: 104 BPM | TEMPERATURE: 98.1 F | SYSTOLIC BLOOD PRESSURE: 105 MMHG

## 2024-12-04 PROCEDURE — 96366 THER/PROPH/DIAG IV INF ADDON: CPT

## 2024-12-04 PROCEDURE — 96365 THER/PROPH/DIAG IV INF INIT: CPT

## 2024-12-04 RX ORDER — INFLIXIMAB-DYYB 100 MG/10ML
100 INJECTION, POWDER, LYOPHILIZED, FOR SOLUTION INTRAVENOUS
Qty: 0 | Refills: 0 | Status: COMPLETED
Start: 2024-04-09

## 2024-12-04 RX ORDER — DIPHENHYDRAMINE HCL 25 MG/1
25 CAPSULE ORAL
Qty: 0 | Refills: 0 | Status: COMPLETED
Start: 2024-04-09

## 2025-01-02 ENCOUNTER — APPOINTMENT (OUTPATIENT)
Dept: RHEUMATOLOGY | Facility: CLINIC | Age: 35
End: 2025-01-02
Payer: COMMERCIAL

## 2025-01-02 VITALS — DIASTOLIC BLOOD PRESSURE: 75 MMHG | SYSTOLIC BLOOD PRESSURE: 109 MMHG | HEART RATE: 87 BPM | OXYGEN SATURATION: 98 %

## 2025-01-02 VITALS
RESPIRATION RATE: 16 BRPM | TEMPERATURE: 98.7 F | OXYGEN SATURATION: 99 % | DIASTOLIC BLOOD PRESSURE: 84 MMHG | SYSTOLIC BLOOD PRESSURE: 125 MMHG | HEART RATE: 115 BPM

## 2025-01-02 PROCEDURE — 96365 THER/PROPH/DIAG IV INF INIT: CPT

## 2025-01-02 PROCEDURE — 96366 THER/PROPH/DIAG IV INF ADDON: CPT

## 2025-01-02 RX ORDER — INFLIXIMAB-DYYB 100 MG/10ML
100 INJECTION, POWDER, LYOPHILIZED, FOR SOLUTION INTRAVENOUS
Qty: 0 | Refills: 0 | Status: COMPLETED
Start: 2024-04-09

## 2025-01-02 RX ORDER — DIPHENHYDRAMINE HCL 25 MG/1
25 CAPSULE ORAL
Qty: 0 | Refills: 0 | Status: COMPLETED
Start: 2024-04-09

## 2025-01-29 ENCOUNTER — APPOINTMENT (OUTPATIENT)
Dept: RHEUMATOLOGY | Facility: CLINIC | Age: 35
End: 2025-01-29
Payer: COMMERCIAL

## 2025-01-29 VITALS
OXYGEN SATURATION: 97 % | HEART RATE: 110 BPM | DIASTOLIC BLOOD PRESSURE: 85 MMHG | SYSTOLIC BLOOD PRESSURE: 119 MMHG | TEMPERATURE: 98.4 F | RESPIRATION RATE: 16 BRPM

## 2025-01-29 VITALS — DIASTOLIC BLOOD PRESSURE: 83 MMHG | HEART RATE: 84 BPM | SYSTOLIC BLOOD PRESSURE: 126 MMHG | OXYGEN SATURATION: 97 %

## 2025-01-29 PROCEDURE — 96366 THER/PROPH/DIAG IV INF ADDON: CPT

## 2025-01-29 PROCEDURE — 96365 THER/PROPH/DIAG IV INF INIT: CPT

## 2025-01-29 RX ORDER — INFLIXIMAB-DYYB 100 MG/10ML
100 INJECTION, POWDER, LYOPHILIZED, FOR SOLUTION INTRAVENOUS
Qty: 0 | Refills: 0 | Status: COMPLETED
Start: 2024-04-09

## 2025-01-29 RX ORDER — DIPHENHYDRAMINE HCL 25 MG/1
25 CAPSULE ORAL
Qty: 0 | Refills: 0 | Status: COMPLETED
Start: 2024-04-09

## 2025-02-26 ENCOUNTER — APPOINTMENT (OUTPATIENT)
Dept: MRI IMAGING | Facility: CLINIC | Age: 35
End: 2025-02-26
Payer: COMMERCIAL

## 2025-02-26 ENCOUNTER — APPOINTMENT (OUTPATIENT)
Dept: RHEUMATOLOGY | Facility: CLINIC | Age: 35
End: 2025-02-26
Payer: COMMERCIAL

## 2025-02-26 VITALS
TEMPERATURE: 98.5 F | DIASTOLIC BLOOD PRESSURE: 77 MMHG | OXYGEN SATURATION: 99 % | HEART RATE: 99 BPM | RESPIRATION RATE: 16 BRPM | SYSTOLIC BLOOD PRESSURE: 109 MMHG

## 2025-02-26 VITALS — DIASTOLIC BLOOD PRESSURE: 76 MMHG | SYSTOLIC BLOOD PRESSURE: 110 MMHG | OXYGEN SATURATION: 96 % | HEART RATE: 71 BPM

## 2025-02-26 PROCEDURE — 96365 THER/PROPH/DIAG IV INF INIT: CPT

## 2025-02-26 PROCEDURE — 72158 MRI LUMBAR SPINE W/O & W/DYE: CPT

## 2025-02-26 PROCEDURE — 70553 MRI BRAIN STEM W/O & W/DYE: CPT

## 2025-02-26 PROCEDURE — A9585: CPT

## 2025-02-26 PROCEDURE — 96366 THER/PROPH/DIAG IV INF ADDON: CPT

## 2025-02-26 RX ORDER — DIPHENHYDRAMINE HCL 25 MG/1
25 CAPSULE ORAL
Qty: 0 | Refills: 0 | Status: COMPLETED
Start: 2024-04-09

## 2025-02-26 RX ORDER — INFLIXIMAB-DYYB 100 MG/10ML
100 INJECTION, POWDER, LYOPHILIZED, FOR SOLUTION INTRAVENOUS
Qty: 0 | Refills: 0 | Status: COMPLETED
Start: 2024-04-09

## 2025-03-14 DIAGNOSIS — D86.89 SARCOIDOSIS OF OTHER SITES: ICD-10-CM

## 2025-03-18 RX ORDER — INFLIXIMAB-DYYB 100 MG/10ML
100 INJECTION, POWDER, LYOPHILIZED, FOR SOLUTION INTRAVENOUS
Refills: 0 | Status: ACTIVE | OUTPATIENT
Start: 2025-03-14

## 2025-03-24 ENCOUNTER — APPOINTMENT (OUTPATIENT)
Dept: NEUROLOGY | Facility: CLINIC | Age: 35
End: 2025-03-24
Payer: COMMERCIAL

## 2025-03-24 VITALS
HEART RATE: 84 BPM | WEIGHT: 160 LBS | DIASTOLIC BLOOD PRESSURE: 70 MMHG | BODY MASS INDEX: 30.21 KG/M2 | SYSTOLIC BLOOD PRESSURE: 101 MMHG | HEIGHT: 61 IN

## 2025-03-24 DIAGNOSIS — D86.89 SARCOIDOSIS OF OTHER SITES: ICD-10-CM

## 2025-03-24 PROCEDURE — G2211 COMPLEX E/M VISIT ADD ON: CPT | Mod: NC

## 2025-03-24 PROCEDURE — 99215 OFFICE O/P EST HI 40 MIN: CPT

## 2025-03-26 ENCOUNTER — APPOINTMENT (OUTPATIENT)
Dept: RHEUMATOLOGY | Facility: CLINIC | Age: 35
End: 2025-03-26
Payer: COMMERCIAL

## 2025-03-26 VITALS
HEART RATE: 107 BPM | TEMPERATURE: 98.2 F | OXYGEN SATURATION: 100 % | DIASTOLIC BLOOD PRESSURE: 78 MMHG | RESPIRATION RATE: 16 BRPM | SYSTOLIC BLOOD PRESSURE: 112 MMHG

## 2025-03-26 VITALS
SYSTOLIC BLOOD PRESSURE: 101 MMHG | HEART RATE: 78 BPM | RESPIRATION RATE: 16 BRPM | OXYGEN SATURATION: 100 % | DIASTOLIC BLOOD PRESSURE: 71 MMHG

## 2025-03-26 PROCEDURE — 96366 THER/PROPH/DIAG IV INF ADDON: CPT

## 2025-03-26 PROCEDURE — 36415 COLL VENOUS BLD VENIPUNCTURE: CPT

## 2025-03-26 PROCEDURE — 96365 THER/PROPH/DIAG IV INF INIT: CPT

## 2025-03-26 RX ORDER — DIPHENHYDRAMINE HCL 25 MG/1
25 CAPSULE ORAL
Qty: 0 | Refills: 0 | Status: COMPLETED
Start: 2024-04-09

## 2025-03-26 RX ORDER — INFLIXIMAB-DYYB 100 MG/10ML
100 INJECTION, POWDER, LYOPHILIZED, FOR SOLUTION INTRAVENOUS
Qty: 0 | Refills: 0 | Status: COMPLETED
Start: 2025-03-14

## 2025-03-28 LAB
HAV IGM SER QL: NONREACTIVE
HBV CORE IGG+IGM SER QL: NONREACTIVE
HBV CORE IGM SER QL: NONREACTIVE
HBV SURFACE AG SER QL: NONREACTIVE
HCV AB SER QL: NONREACTIVE
HCV S/CO RATIO: 0.11 S/CO

## 2025-04-01 LAB
M TB IFN-G BLD-IMP: NEGATIVE
QUANTIFERON TB PLUS MITOGEN MINUS NIL: >10 IU/ML
QUANTIFERON TB PLUS NIL: 0.02 IU/ML
QUANTIFERON TB PLUS TB1 MINUS NIL: 0 IU/ML
QUANTIFERON TB PLUS TB2 MINUS NIL: 0 IU/ML

## 2025-04-23 ENCOUNTER — APPOINTMENT (OUTPATIENT)
Dept: RHEUMATOLOGY | Facility: CLINIC | Age: 35
End: 2025-04-23
Payer: COMMERCIAL

## 2025-04-23 VITALS
RESPIRATION RATE: 16 BRPM | OXYGEN SATURATION: 98 % | DIASTOLIC BLOOD PRESSURE: 82 MMHG | HEART RATE: 72 BPM | SYSTOLIC BLOOD PRESSURE: 121 MMHG | TEMPERATURE: 98.2 F

## 2025-04-23 VITALS
HEART RATE: 68 BPM | OXYGEN SATURATION: 97 % | SYSTOLIC BLOOD PRESSURE: 108 MMHG | DIASTOLIC BLOOD PRESSURE: 75 MMHG | RESPIRATION RATE: 16 BRPM

## 2025-04-23 PROCEDURE — 96366 THER/PROPH/DIAG IV INF ADDON: CPT

## 2025-04-23 PROCEDURE — 36415 COLL VENOUS BLD VENIPUNCTURE: CPT

## 2025-04-23 PROCEDURE — 96365 THER/PROPH/DIAG IV INF INIT: CPT

## 2025-04-23 RX ORDER — INFLIXIMAB-DYYB 100 MG/10ML
100 INJECTION, POWDER, LYOPHILIZED, FOR SOLUTION INTRAVENOUS
Qty: 0 | Refills: 0 | Status: COMPLETED
Start: 2025-03-14

## 2025-04-23 RX ORDER — DIPHENHYDRAMINE HCL 25 MG/1
25 CAPSULE ORAL
Qty: 0 | Refills: 0 | Status: COMPLETED
Start: 2024-04-09

## 2025-05-21 ENCOUNTER — APPOINTMENT (OUTPATIENT)
Dept: RHEUMATOLOGY | Facility: CLINIC | Age: 35
End: 2025-05-21
Payer: COMMERCIAL

## 2025-05-21 VITALS
RESPIRATION RATE: 16 BRPM | DIASTOLIC BLOOD PRESSURE: 74 MMHG | HEART RATE: 85 BPM | SYSTOLIC BLOOD PRESSURE: 106 MMHG | TEMPERATURE: 98.1 F | OXYGEN SATURATION: 98 %

## 2025-05-21 VITALS
DIASTOLIC BLOOD PRESSURE: 74 MMHG | OXYGEN SATURATION: 99 % | RESPIRATION RATE: 16 BRPM | SYSTOLIC BLOOD PRESSURE: 104 MMHG | HEART RATE: 61 BPM

## 2025-05-21 PROCEDURE — 96366 THER/PROPH/DIAG IV INF ADDON: CPT

## 2025-05-21 PROCEDURE — 36415 COLL VENOUS BLD VENIPUNCTURE: CPT

## 2025-05-21 PROCEDURE — 96365 THER/PROPH/DIAG IV INF INIT: CPT

## 2025-05-21 RX ORDER — INFLIXIMAB-DYYB 100 MG/10ML
100 INJECTION, POWDER, LYOPHILIZED, FOR SOLUTION INTRAVENOUS
Qty: 0 | Refills: 0 | Status: COMPLETED
Start: 2025-03-14

## 2025-05-21 RX ORDER — DIPHENHYDRAMINE HCL 25 MG/1
25 CAPSULE ORAL
Qty: 0 | Refills: 0 | Status: COMPLETED
Start: 2024-04-09

## 2025-06-18 ENCOUNTER — APPOINTMENT (OUTPATIENT)
Dept: RHEUMATOLOGY | Facility: CLINIC | Age: 35
End: 2025-06-18
Payer: COMMERCIAL

## 2025-06-18 VITALS
SYSTOLIC BLOOD PRESSURE: 124 MMHG | DIASTOLIC BLOOD PRESSURE: 80 MMHG | OXYGEN SATURATION: 97 % | RESPIRATION RATE: 16 BRPM | TEMPERATURE: 98.2 F | HEART RATE: 100 BPM

## 2025-06-18 VITALS — SYSTOLIC BLOOD PRESSURE: 102 MMHG | HEART RATE: 65 BPM | OXYGEN SATURATION: 100 % | DIASTOLIC BLOOD PRESSURE: 71 MMHG

## 2025-06-18 PROCEDURE — 96365 THER/PROPH/DIAG IV INF INIT: CPT

## 2025-06-18 PROCEDURE — 36415 COLL VENOUS BLD VENIPUNCTURE: CPT

## 2025-06-18 PROCEDURE — 96366 THER/PROPH/DIAG IV INF ADDON: CPT

## 2025-06-18 RX ORDER — DIPHENHYDRAMINE HCL 25 MG/1
25 CAPSULE ORAL
Qty: 0 | Refills: 0 | Status: COMPLETED
Start: 2024-04-09

## 2025-06-18 RX ORDER — INFLIXIMAB-DYYB 100 MG/10ML
100 INJECTION, POWDER, LYOPHILIZED, FOR SOLUTION INTRAVENOUS
Qty: 0 | Refills: 0 | Status: COMPLETED
Start: 2025-03-14

## 2025-07-16 ENCOUNTER — APPOINTMENT (OUTPATIENT)
Dept: RHEUMATOLOGY | Facility: CLINIC | Age: 35
End: 2025-07-16
Payer: COMMERCIAL

## 2025-07-16 VITALS
SYSTOLIC BLOOD PRESSURE: 109 MMHG | OXYGEN SATURATION: 97 % | HEART RATE: 83 BPM | DIASTOLIC BLOOD PRESSURE: 77 MMHG | RESPIRATION RATE: 16 BRPM | TEMPERATURE: 98.2 F

## 2025-07-16 VITALS
RESPIRATION RATE: 16 BRPM | DIASTOLIC BLOOD PRESSURE: 74 MMHG | OXYGEN SATURATION: 98 % | SYSTOLIC BLOOD PRESSURE: 105 MMHG | HEART RATE: 65 BPM

## 2025-07-16 PROCEDURE — 36415 COLL VENOUS BLD VENIPUNCTURE: CPT

## 2025-07-16 PROCEDURE — 96365 THER/PROPH/DIAG IV INF INIT: CPT

## 2025-07-16 PROCEDURE — 96366 THER/PROPH/DIAG IV INF ADDON: CPT

## 2025-07-16 RX ORDER — DIPHENHYDRAMINE HCL 25 MG/1
25 TABLET ORAL
Qty: 0 | Refills: 0 | Status: ACTIVE | OUTPATIENT
Start: 2025-07-16 | End: 1900-01-01

## 2025-07-16 RX ORDER — DIPHENHYDRAMINE HCL 25 MG/1
25 TABLET ORAL
Qty: 0 | Refills: 0 | Status: COMPLETED
Start: 2025-07-16

## 2025-07-16 RX ORDER — INFLIXIMAB-DYYB 100 MG/10ML
100 INJECTION, POWDER, LYOPHILIZED, FOR SOLUTION INTRAVENOUS
Qty: 0 | Refills: 0 | Status: COMPLETED
Start: 2025-03-14

## 2025-07-16 RX ADMIN — DIPHENHYDRAMINE HCL 0 MG: 25 TABLET ORAL at 00:00

## 2025-07-24 ENCOUNTER — TRANSCRIPTION ENCOUNTER (OUTPATIENT)
Age: 35
End: 2025-07-24

## 2025-07-24 DIAGNOSIS — D86.89 SARCOIDOSIS OF OTHER SITES: ICD-10-CM

## 2025-07-25 ENCOUNTER — TRANSCRIPTION ENCOUNTER (OUTPATIENT)
Age: 35
End: 2025-07-25

## 2025-08-08 ENCOUNTER — APPOINTMENT (OUTPATIENT)
Dept: MRI IMAGING | Facility: CLINIC | Age: 35
End: 2025-08-08

## 2025-08-08 PROCEDURE — 70553 MRI BRAIN STEM W/O & W/DYE: CPT

## 2025-08-08 PROCEDURE — 70543 MRI ORBT/FAC/NCK W/O &W/DYE: CPT

## 2025-08-08 PROCEDURE — A9585: CPT | Mod: JW

## 2025-08-08 PROCEDURE — A9585: CPT

## 2025-08-13 ENCOUNTER — APPOINTMENT (OUTPATIENT)
Dept: RHEUMATOLOGY | Facility: CLINIC | Age: 35
End: 2025-08-13
Payer: COMMERCIAL

## 2025-08-13 ENCOUNTER — APPOINTMENT (OUTPATIENT)
Dept: MRI IMAGING | Facility: CLINIC | Age: 35
End: 2025-08-13
Payer: COMMERCIAL

## 2025-08-13 VITALS
RESPIRATION RATE: 16 BRPM | HEART RATE: 78 BPM | SYSTOLIC BLOOD PRESSURE: 102 MMHG | DIASTOLIC BLOOD PRESSURE: 72 MMHG | OXYGEN SATURATION: 99 % | TEMPERATURE: 98.1 F

## 2025-08-13 VITALS
DIASTOLIC BLOOD PRESSURE: 72 MMHG | WEIGHT: 148 LBS | HEART RATE: 64 BPM | BODY MASS INDEX: 27.96 KG/M2 | OXYGEN SATURATION: 100 % | RESPIRATION RATE: 16 BRPM | SYSTOLIC BLOOD PRESSURE: 103 MMHG

## 2025-08-13 PROCEDURE — 72157 MRI CHEST SPINE W/O & W/DYE: CPT

## 2025-08-13 PROCEDURE — 36415 COLL VENOUS BLD VENIPUNCTURE: CPT

## 2025-08-13 PROCEDURE — 96366 THER/PROPH/DIAG IV INF ADDON: CPT

## 2025-08-13 PROCEDURE — 72156 MRI NECK SPINE W/O & W/DYE: CPT

## 2025-08-13 PROCEDURE — A9585: CPT

## 2025-08-13 PROCEDURE — 96365 THER/PROPH/DIAG IV INF INIT: CPT

## 2025-08-13 RX ORDER — INFLIXIMAB-DYYB 100 MG/10ML
100 INJECTION, POWDER, LYOPHILIZED, FOR SOLUTION INTRAVENOUS
Qty: 0 | Refills: 0 | Status: COMPLETED
Start: 2025-03-14

## 2025-08-13 RX ORDER — DIPHENHYDRAMINE HCL 25 MG/1
25 TABLET ORAL
Qty: 0 | Refills: 0 | Status: COMPLETED
Start: 2025-07-16

## 2025-08-19 ENCOUNTER — APPOINTMENT (OUTPATIENT)
Dept: NEUROLOGY | Facility: CLINIC | Age: 35
End: 2025-08-19
Payer: COMMERCIAL

## 2025-08-19 VITALS
HEART RATE: 83 BPM | DIASTOLIC BLOOD PRESSURE: 79 MMHG | WEIGHT: 148 LBS | BODY MASS INDEX: 27.94 KG/M2 | HEIGHT: 61 IN | SYSTOLIC BLOOD PRESSURE: 111 MMHG

## 2025-08-19 PROCEDURE — G2211 COMPLEX E/M VISIT ADD ON: CPT | Mod: NC

## 2025-08-19 PROCEDURE — 99214 OFFICE O/P EST MOD 30 MIN: CPT

## 2025-08-27 ENCOUNTER — TRANSCRIPTION ENCOUNTER (OUTPATIENT)
Age: 35
End: 2025-08-27

## 2025-08-28 DIAGNOSIS — D86.89 SARCOIDOSIS OF OTHER SITES: ICD-10-CM

## 2025-08-29 RX ORDER — INFLIXIMAB-DYYB 100 MG/10ML
100 INJECTION, POWDER, LYOPHILIZED, FOR SOLUTION INTRAVENOUS
Qty: 0 | Refills: 0 | Status: ACTIVE | OUTPATIENT
Start: 2025-08-28

## 2025-08-29 RX ORDER — INFLIXIMAB-DYYB 100 MG/10ML
100 INJECTION, POWDER, LYOPHILIZED, FOR SOLUTION INTRAVENOUS
Qty: 4 | Refills: 11 | Status: DISCONTINUED | COMMUNITY
Start: 2025-08-28 | End: 2025-08-29

## 2025-09-10 ENCOUNTER — APPOINTMENT (OUTPATIENT)
Dept: RHEUMATOLOGY | Facility: CLINIC | Age: 35
End: 2025-09-10
Payer: COMMERCIAL

## 2025-09-10 VITALS
HEART RATE: 77 BPM | DIASTOLIC BLOOD PRESSURE: 66 MMHG | OXYGEN SATURATION: 98 % | SYSTOLIC BLOOD PRESSURE: 96 MMHG | RESPIRATION RATE: 16 BRPM

## 2025-09-10 VITALS
SYSTOLIC BLOOD PRESSURE: 110 MMHG | RESPIRATION RATE: 16 BRPM | HEART RATE: 105 BPM | TEMPERATURE: 98.3 F | OXYGEN SATURATION: 98 % | DIASTOLIC BLOOD PRESSURE: 80 MMHG

## 2025-09-10 PROCEDURE — 96365 THER/PROPH/DIAG IV INF INIT: CPT

## 2025-09-10 PROCEDURE — 96366 THER/PROPH/DIAG IV INF ADDON: CPT

## 2025-09-10 RX ORDER — INFLIXIMAB-DYYB 100 MG/10ML
100 INJECTION, POWDER, LYOPHILIZED, FOR SOLUTION INTRAVENOUS
Qty: 0 | Refills: 0 | Status: COMPLETED
Start: 2025-08-28

## 2025-09-10 RX ORDER — DIPHENHYDRAMINE HCL 25 MG/1
25 TABLET ORAL
Qty: 0 | Refills: 0 | Status: COMPLETED
Start: 2025-07-16